# Patient Record
Sex: FEMALE | Race: WHITE | Employment: OTHER | URBAN - METROPOLITAN AREA
[De-identification: names, ages, dates, MRNs, and addresses within clinical notes are randomized per-mention and may not be internally consistent; named-entity substitution may affect disease eponyms.]

---

## 2017-12-11 ENCOUNTER — TRANSFERRED RECORDS (OUTPATIENT)
Dept: HEALTH INFORMATION MANAGEMENT | Facility: CLINIC | Age: 72
End: 2017-12-11

## 2018-01-03 ENCOUNTER — OFFICE VISIT (OUTPATIENT)
Dept: CARDIOLOGY | Facility: CLINIC | Age: 73
End: 2018-01-03
Payer: MEDICARE

## 2018-01-03 VITALS — SYSTOLIC BLOOD PRESSURE: 91 MMHG | HEART RATE: 62 BPM | DIASTOLIC BLOOD PRESSURE: 54 MMHG

## 2018-01-03 DIAGNOSIS — Z13.6 SCREENING FOR CARDIOVASCULAR CONDITION: ICD-10-CM

## 2018-01-03 DIAGNOSIS — Z13.6 SCREENING FOR CARDIOVASCULAR CONDITION: Primary | ICD-10-CM

## 2018-01-03 DIAGNOSIS — E11.00 TYPE 2 DIABETES MELLITUS WITH HYPEROSMOLARITY WITHOUT COMA, WITHOUT LONG-TERM CURRENT USE OF INSULIN (H): ICD-10-CM

## 2018-01-03 DIAGNOSIS — I89.0 LYMPHEDEMA OF BOTH LOWER EXTREMITIES: Primary | ICD-10-CM

## 2018-01-03 DIAGNOSIS — N18.30 CKD (CHRONIC KIDNEY DISEASE) STAGE 3, GFR 30-59 ML/MIN (H): ICD-10-CM

## 2018-01-03 DIAGNOSIS — C83.10 MANTLE CELL LYMPHOMA, UNSPECIFIED BODY REGION (H): ICD-10-CM

## 2018-01-03 DIAGNOSIS — R60.0 LOCALIZED EDEMA: ICD-10-CM

## 2018-01-03 DIAGNOSIS — I48.19 PERSISTENT ATRIAL FIBRILLATION (H): ICD-10-CM

## 2018-01-03 PROCEDURE — 99205 OFFICE O/P NEW HI 60 MIN: CPT | Performed by: INTERNAL MEDICINE

## 2018-01-03 PROCEDURE — 93005 ELECTROCARDIOGRAM TRACING: CPT | Performed by: INTERNAL MEDICINE

## 2018-01-03 RX ORDER — BISACODYL 5 MG/1
5 TABLET, DELAYED RELEASE ORAL DAILY
COMMUNITY

## 2018-01-03 RX ORDER — DIGOXIN 125 MCG
125 TABLET ORAL DAILY
COMMUNITY

## 2018-01-03 RX ORDER — MULTIVIT WITH MINERALS/LUTEIN
1000 TABLET ORAL DAILY
Status: ON HOLD | COMMUNITY
End: 2020-10-08

## 2018-01-03 RX ORDER — TIZANIDINE HYDROCHLORIDE 2 MG/1
2 CAPSULE, GELATIN COATED ORAL 4 TIMES DAILY PRN
COMMUNITY

## 2018-01-03 RX ORDER — FLUTICASONE PROPIONATE 220 UG/1
2 AEROSOL, METERED RESPIRATORY (INHALATION) 2 TIMES DAILY
COMMUNITY

## 2018-01-03 RX ORDER — METOPROLOL TARTRATE 50 MG
25 TABLET ORAL 2 TIMES DAILY
COMMUNITY

## 2018-01-03 RX ORDER — SIMVASTATIN 10 MG
10 TABLET ORAL AT BEDTIME
COMMUNITY

## 2018-01-03 RX ORDER — PIOGLITAZONEHYDROCHLORIDE 30 MG/1
30 TABLET ORAL DAILY
Status: ON HOLD | COMMUNITY
End: 2020-10-20

## 2018-01-03 RX ORDER — ALBUTEROL SULFATE 90 UG/1
2 AEROSOL, METERED RESPIRATORY (INHALATION) EVERY 4 HOURS PRN
COMMUNITY

## 2018-01-03 RX ORDER — FUROSEMIDE 40 MG
80 TABLET ORAL DAILY
Status: ON HOLD | COMMUNITY
End: 2020-10-20

## 2018-01-03 RX ORDER — POTASSIUM CHLORIDE 1.5 G/1.58G
20 POWDER, FOR SOLUTION ORAL 2 TIMES DAILY
Status: ON HOLD | COMMUNITY
End: 2020-10-20

## 2018-01-03 RX ORDER — LISINOPRIL 10 MG/1
10 TABLET ORAL DAILY
Status: ON HOLD | COMMUNITY
End: 2020-10-20

## 2018-01-03 RX ORDER — OSELTAMIVIR PHOSPHATE 75 MG/1
75 CAPSULE ORAL DAILY
Status: ON HOLD | COMMUNITY
End: 2020-10-08

## 2018-01-03 RX ORDER — OXYBUTYNIN CHLORIDE 5 MG/1
5 TABLET ORAL 2 TIMES DAILY
COMMUNITY

## 2018-01-03 RX ORDER — CEPHALEXIN 500 MG/1
500 CAPSULE ORAL 3 TIMES DAILY
Status: ON HOLD | COMMUNITY
End: 2020-10-08

## 2018-01-03 NOTE — MR AVS SNAPSHOT
"              After Visit Summary   1/3/2018    Waleska Marie    MRN: [id number]           Patient Information     Date Of Birth          1945        Visit Information        Provider Department      1/3/2018 10:00 AM Chicho Chacon MD [facility]        Today's Diagnoses     Localized edema    -  1    Screening for cardiovascular condition        Lymphedema of both lower extremities           Follow-ups after your visit        Additional Services     LYMPHEDEMA THERAPY REFERRAL       This therapy referral will be filtered to a centralized scheduling office at [facility] and the patient will receive a call to schedule an appointment at a [facility] location most convenient for them. *   If you have not heard from the scheduling office within 2 business days, please call [phone] for all locations, with the exception of [place], please call [phone].     Treatment: PT or OT Evaluation & Treatment  PT/OT Treatment Diagnosis:    Please be aware that coverage of these services is subject to the terms and limitations of your health insurance plan.  Call member services at your health plan with any benefit or coverage questions.    Note to Provider:  If you are referring outside of [facility] for the therapy appointment, please list the name of the location in the \"special instructions\" above, print the referral and give to the patient to schedule the appointment.            Follow-Up with Cardiologist                 Your next 10 appointments already scheduled     Jan 12, 2018 10:00 AM CST   US LOWER EXTREMITY VENOUS COMPETENCY BILATERAL with [facility] ([facility])    [id number] [address] [address] [phone]           Please bring a list of your medicines (including vitamins, minerals and over-the-counter drugs). Also, tell your doctor about any allergies " "you may have. Wear comfortable clothes and leave your valuables at home.  You do not need to do anything special to prepare for your exam.  Please call the Imaging Department at your exam site with any questions.            Jan 12, 2018  2:00 PM CST   Lymphedema Evaluation with Norberto Parsons   [facility] ([facility])    [address]  [facility] [address]   [phone]              Future tests that were ordered for you today     Open Future Orders        Priority Expected Expires Ordered    Follow-Up with Cardiologist Routine 2/9/2018 1/3/2019 1/3/2018     Venous Competency Bilateral Routine 2/2/2018 1/3/2019 1/3/2018    LYMPHEDEMA THERAPY REFERRAL Routine 1/3/2018 1/3/2020 1/3/2018            Who to contact     If you have questions or need follow up information about today's clinic visit or your schedule please contact [facility] directly at [phone].  Normal or non-critical lab and imaging results will be communicated to you by [employer], letter or phone within 4 business days after the clinic has received the results. If you do not hear from us within 7 days, please contact the clinic through [employer] or phone. If you have a critical or abnormal lab result, we will notify you by phone as soon as possible.  Submit refill requests through [employer] or call your pharmacy and they will forward the refill request to us. Please allow 3 business days for your refill to be completed.          Additional Information About Your Visit        [employer] Information     [employer] lets you send messages to your doctor, view your test results, renew your prescriptions, schedule appointments and more. To sign up, go to [email or web address] . Click on \"Log in\" on the left side of the screen, which will take you to the Welcome page. Then click on \"Sign up Now\" on the right side of the page.     You will be asked to enter the access code " listed below, as well as some personal information. Please follow the directions to create your username and password.     Your access code is: [email or web address]  Expires: 4/3/2018 11:09 AM     Your access code will  in 90 days. If you need help or a new code, please call your [facility] clinic or [phone].        Care EveryWhere ID     This is your Care EveryWhere ID. This could be used by other organizations to access your [facility] medical records  [id number]        Your Vitals Were     Pulse                   62            Blood Pressure from Last 3 Encounters:   18 91/54    Weight from Last 3 Encounters:   No data found for Wt              We Performed the Following     EKG 12-lead complete w/read - Clinics (future- to be scheduled)        Primary Care Provider Office Phone # Fax #    Nahun Espinoza [phone]       [place] [address]        Equal Access to Services     BRAIN ANTHONY : Hadii aad ku hadasho Soomaali, waaxda luqadaha, qaybta kaalmada adeegyada, waxay idiin hayaan kalie diaz . So [facility] [phone].    ATENCIÓN: Si habla español, tiene a thomson disposición servicios gratuitos de asistencia lingüística. Hans al [phone].    We comply with applicable federal civil rights laws and [place] laws. We do not discriminate on the basis of race, color, national origin, age, disability, sex, sexual orientation, or gender identity.            Thank you!     Thank you for choosing [facility]  for your care. Our goal is always to provide you with excellent care. Hearing back from our patients is one way we can continue to improve our services. Please take a few minutes to complete the written survey that you may receive in the mail after your visit with us. Thank you!             Your Updated Medication List - Protect others around you: Learn how to safely use, store and throw away your medicines  at www.disposemymeds.org.          This list is accurate as of: 1/3/18 11:11 AM.  Always use your most recent med list.                   Brand Name Dispense Instructions for use Diagnosis    ACTOS 30 MG tablet   Generic drug:  pioglitazone      Take 30 mg by mouth daily        albuterol 108 (90 BASE) MCG/ACT Inhaler    PROAIR HFA/PROVENTIL HFA/VENTOLIN HFA     Inhale 2 puffs into the lungs every 4 hours as needed for shortness of breath / dyspnea or wheezing        aspirin 81 MG EC tablet      Take 81 mg by mouth daily        digoxin 125 MCG tablet    LANOXIN     Take 125 mcg by mouth daily        DULCOLAX 5 MG EC tablet   Generic drug:  bisacodyl      Take 10 mg by mouth daily        ELIQUIS 5 MG tablet   Generic drug:  apixaban ANTICOAGULANT      Take 5 mg by mouth 2 times daily        FLOVENT  MCG/ACT Inhaler   Generic drug:  fluticasone      Inhale 1 puff into the lungs 2 times daily        furosemide 40 MG tablet    LASIX     Take 80 mg by mouth 2 times daily        GLUCOSAMINE-CHONDROITIN PO      Take 2 capsules by mouth 2 times daily        IMBRUVICA 140 MG capsule   Generic drug:  ibrutinib      Take 420 mg by mouth daily        KEFLEX 500 MG capsule   Generic drug:  cephALEXin      Take 500 mg by mouth 3 times daily        LANTUS SOLOSTAR 100 UNIT/ML injection   Generic drug:  insulin glargine      Inject Subcutaneous At Bedtime        lisinopril 10 MG tablet    PRINIVIL/ZESTRIL     Take 10 mg by mouth daily        metFORMIN 500 MG tablet    GLUCOPHAGE     Take 500 mg by mouth 2 times daily (with meals)        METHYL SALICYLATE EX      Externally apply topically as needed        metoprolol 50 MG tablet    LOPRESSOR     Take 25 mg by mouth 2 times daily        MULTIVITAMIN ADULT PO      Take by mouth daily        oxybutynin 5 MG tablet    DITROPAN     Take 5 mg by mouth 2 times daily        potassium chloride 20 MEQ Packet    KLOR-CON     Take 20 mEq by mouth 2 times daily        simvastatin 10 MG  tablet    ZOCOR     Take 10 mg by mouth At Bedtime        SOY ISOFLAVONE PO      Take 198 mg by mouth daily        TAMIFLU 75 MG capsule   Generic drug:  oseltamivir      Take 75 mg by mouth daily        vitamin E 1000 UNIT capsule    TOCOPHEROL     Take 1,000 Units by mouth daily        ZANAFLEX 2 MG Caps   Generic drug:  TiZANidine HCl      Take by mouth as needed        ZANTAC 150 MG tablet   Generic drug:  ranitidine      Take 150 mg by mouth 2 times daily        ZYRTEC ALLERGY 10 MG Caps   Generic drug:  cetirizine HCl      Take by mouth daily

## 2018-01-03 NOTE — PROGRESS NOTES
HPI and Plan:   See dictation    Orders Placed This Encounter   Procedures     US Venous Competency Bilateral     LYMPHEDEMA THERAPY REFERRAL     Follow-Up with Cardiologist       Orders Placed This Encounter   Medications     albuterol (PROAIR HFA/PROVENTIL HFA/VENTOLIN HFA) 108 (90 BASE) MCG/ACT Inhaler     Sig: Inhale 2 puffs into the lungs every 4 hours as needed for shortness of breath / dyspnea or wheezing     apixaban ANTICOAGULANT (ELIQUIS) 5 MG tablet     Sig: Take 5 mg by mouth 2 times daily     aspirin 81 MG EC tablet     Sig: Take 81 mg by mouth daily     bisacodyl (DULCOLAX) 5 MG EC tablet     Sig: Take 10 mg by mouth daily     cephALEXin (KEFLEX) 500 MG capsule     Sig: Take 500 mg by mouth 3 times daily     cetirizine HCl (ZYRTEC ALLERGY) 10 MG CAPS     Sig: Take by mouth daily     digoxin (LANOXIN) 125 MCG tablet     Sig: Take 125 mcg by mouth daily     fluticasone (FLOVENT HFA) 220 MCG/ACT Inhaler     Sig: Inhale 1 puff into the lungs 2 times daily     furosemide (LASIX) 40 MG tablet     Sig: Take 80 mg by mouth 2 times daily     GLUCOSAMINE-CHONDROITIN PO     Sig: Take 2 capsules by mouth 2 times daily     ibrutinib (IMBRUVICA) 140 MG capsule     Sig: Take 420 mg by mouth daily     insulin glargine (LANTUS SOLOSTAR) 100 UNIT/ML injection     Sig: Inject Subcutaneous At Bedtime     lisinopril (PRINIVIL/ZESTRIL) 10 MG tablet     Sig: Take 10 mg by mouth daily     metFORMIN (GLUCOPHAGE) 500 MG tablet     Sig: Take 500 mg by mouth 2 times daily (with meals)     METHYL SALICYLATE EX     Sig: Externally apply topically as needed     metoprolol (LOPRESSOR) 50 MG tablet     Sig: Take 25 mg by mouth 2 times daily     Multiple Vitamins-Minerals (MULTIVITAMIN ADULT PO)     Sig: Take by mouth daily     oseltamivir (TAMIFLU) 75 MG capsule     Sig: Take 75 mg by mouth daily     oxybutynin (DITROPAN) 5 MG tablet     Sig: Take 5 mg by mouth 2 times daily     pioglitazone (ACTOS) 30 MG tablet     Sig: Take 30 mg by  mouth daily     potassium chloride (KLOR-CON) 20 MEQ Packet     Sig: Take 20 mEq by mouth 2 times daily     ranitidine (ZANTAC) 150 MG tablet     Sig: Take 150 mg by mouth 2 times daily     simvastatin (ZOCOR) 10 MG tablet     Sig: Take 10 mg by mouth At Bedtime     SOY ISOFLAVONE PO     Sig: Take 198 mg by mouth daily     TiZANidine HCl (ZANAFLEX) 2 MG CAPS     Sig: Take by mouth as needed     vitamin E (TOCOPHEROL) 1000 UNIT capsule     Sig: Take 1,000 Units by mouth daily       There are no discontinued medications.      Encounter Diagnoses   Name Primary?     Screening for cardiovascular condition      Lymphedema of both lower extremities Yes     Localized edema      Mantle cell lymphoma, unspecified body region (H)      Persistent atrial fibrillation (H)      CKD (chronic kidney disease) stage 3, GFR 30-59 ml/min      Type 2 diabetes mellitus with hyperosmolarity without coma, without long-term current use of insulin (H)        CURRENT MEDICATIONS:  Current Outpatient Prescriptions   Medication Sig Dispense Refill     albuterol (PROAIR HFA/PROVENTIL HFA/VENTOLIN HFA) 108 (90 BASE) MCG/ACT Inhaler Inhale 2 puffs into the lungs every 4 hours as needed for shortness of breath / dyspnea or wheezing       apixaban ANTICOAGULANT (ELIQUIS) 5 MG tablet Take 5 mg by mouth 2 times daily       aspirin 81 MG EC tablet Take 81 mg by mouth daily       bisacodyl (DULCOLAX) 5 MG EC tablet Take 10 mg by mouth daily       cephALEXin (KEFLEX) 500 MG capsule Take 500 mg by mouth 3 times daily       cetirizine HCl (ZYRTEC ALLERGY) 10 MG CAPS Take by mouth daily       digoxin (LANOXIN) 125 MCG tablet Take 125 mcg by mouth daily       fluticasone (FLOVENT HFA) 220 MCG/ACT Inhaler Inhale 1 puff into the lungs 2 times daily       furosemide (LASIX) 40 MG tablet Take 80 mg by mouth 2 times daily       GLUCOSAMINE-CHONDROITIN PO Take 2 capsules by mouth 2 times daily       ibrutinib (IMBRUVICA) 140 MG capsule Take 420 mg by mouth daily        insulin glargine (LANTUS SOLOSTAR) 100 UNIT/ML injection Inject Subcutaneous At Bedtime       lisinopril (PRINIVIL/ZESTRIL) 10 MG tablet Take 10 mg by mouth daily       metFORMIN (GLUCOPHAGE) 500 MG tablet Take 500 mg by mouth 2 times daily (with meals)       METHYL SALICYLATE EX Externally apply topically as needed       metoprolol (LOPRESSOR) 50 MG tablet Take 25 mg by mouth 2 times daily       Multiple Vitamins-Minerals (MULTIVITAMIN ADULT PO) Take by mouth daily       oseltamivir (TAMIFLU) 75 MG capsule Take 75 mg by mouth daily       oxybutynin (DITROPAN) 5 MG tablet Take 5 mg by mouth 2 times daily       pioglitazone (ACTOS) 30 MG tablet Take 30 mg by mouth daily       potassium chloride (KLOR-CON) 20 MEQ Packet Take 20 mEq by mouth 2 times daily       ranitidine (ZANTAC) 150 MG tablet Take 150 mg by mouth 2 times daily       simvastatin (ZOCOR) 10 MG tablet Take 10 mg by mouth At Bedtime       SOY ISOFLAVONE PO Take 198 mg by mouth daily       TiZANidine HCl (ZANAFLEX) 2 MG CAPS Take by mouth as needed       vitamin E (TOCOPHEROL) 1000 UNIT capsule Take 1,000 Units by mouth daily         ALLERGIES     Allergies   Allergen Reactions     Azithromycin Hives     Ciprofloxacin      wheezing     Morphine Hives     Nitrofurantoin      Constipation, wheezing       PAST MEDICAL HISTORY:  No past medical history on file.    PAST SURGICAL HISTORY:  No past surgical history on file.    FAMILY HISTORY:  No family history on file.    SOCIAL HISTORY:  Social History     Social History     Marital status:      Spouse name: N/A     Number of children: N/A     Years of education: N/A     Social History Main Topics     Smoking status: Not on file     Smokeless tobacco: Not on file     Alcohol use Not on file     Drug use: Not on file     Sexual activity: Not on file     Other Topics Concern     Not on file     Social History Narrative       Review of Systems:  Skin:  Negative       Eyes:  Positive for glasses    ENT:   Negative      Respiratory:  Positive for shortness of breath asthma   Cardiovascular:    Positive for;edema;fatigue    Gastroenterology: Negative      Genitourinary:  Negative      Musculoskeletal:  Positive for arthritis    Neurologic:  Positive for headaches;numbness or tingling of hands    Psychiatric:  Positive for sleep disturbances    Heme/Lymph/Imm:  Negative      Endocrine:  Positive for diabetes      Physical Exam:  Vitals: BP 91/54 (BP Location: Other (Comment), Patient Position: Chair, Cuff Size: Adult Regular)  Pulse 62    Constitutional:    obese      Skin:  warm and dry to the touch venous stasis changes        Head:  normocephalic        Eyes:  conjunctivae and lids unremarkable        Lymph:No Cervical lymphadenopathy present     ENT:           Neck:  carotid pulses are full and equal bilaterally;JVP normal        Respiratory:  clear to auscultation         Cardiac: no murmurs, gallops or rubs detected irregularly irregular rhythm                pulses below the femoral arteries are diminished                                      GI:  abdomen soft;non-tender obese      Extremities and Muscular Skeletal:    stasis pigmentation bilateral LE edema;3+          Neurological:  no gross motor deficits        Psych:  Alert and Oriented x 3        CC  [facility]  [address] [place] [address]

## 2018-01-03 NOTE — LETTER
January 3, 2018          Nahun Espinoza MD   [facility]    [address] [place] [id number]      RE: Waleska Marie    MRN: [id number]   : 1945       REASON FOR VISIT:  Lower extremity lymphedema.      REFERRING PHYSICIAN:  Nahun Espinoza MD       Dear Dr. Espinoza:        I had the pleasure of seeing your patient Waleska Marie at the [facility] in [place] this morning.  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  She is referred for further evaluation regarding her lymphedema.     Ms. Marie tells me that she was doing fine until approximately about a year ago when she noticed  lower extremity edema.  The edema has progressed over the last 6 months.  Both legs have extensive lymphedema; however, the left leg is much larger.  She subsequently developed cellulitis as well as some skin breakdown, although she currently does not have any ulcerations.  She describes significant heaviness in both legs as well as achy symptoms.  She is unable to walk because of the lymphedema.  She does have some lymphedema in her left upper extremity as well.      Unfortunately, she has not had any compression therapy as far as I can tell.  She has been on a diuretic program, which has not helped.  She had an echocardiogram approximately 4 months ago in the [facility] system.  This was read as showing normal LV size and function and no wall motion abnormality.  Her RV size and function were normal.  Her PA pressures were mildly elevated, consistent with mild pulmonary hypertension.      As far as I can tell, she has never been evaluated for venous disease, including thrombosis or venous reflux.   However, she did have a CT of the abdomen and pelvis last year, which was read as showing patent inferior vena cava as well as iliac veins bilaterally.  That CT also showed no evidence of enlarged lymph nodes     She denies heart failure symptoms, although she is not very active.  Specifically, she denies significant shortness of breath.  She also denies orthopnea or PND.  No chest pain or palpitations at this point.  She is on a rate-control strategy with regard to her persistent atrial fibrillation and is currently on digoxin as well as metoprolol.  She is also on Eliquis for anticoagulation to prevent stroke from her atrial fibrillation.      IMPRESSION AND PLAN:   1.  Severe bilateral lower extremity lymphedema, left greater than right.   2.  Mild upper extremity lymphedema, left greater than right.   3.  History of mantle cell lymphoma  4.  Morbid obesity.   5.  Persistent atrial fibrillation, on anticoagulation and rate-control strategy.   6.  History of acute on chronic congestive heart failure, now compensated.   7.  Type 2 diabetes.   8.  Hypertension.   9.  Chronic kidney disease.      Unfortunately, she has developed progressive predominantly lower extremity lymphedema, left greater than right, over the last year. Her lymphedema is likely related to her prior Lymphoma. As such she will need a CT of the abdomen and pelvis to further investigate possible recurrence.     We will obtain a venous ultrasound to rule out reflux, although my suspicion for secondary lymphedema related to chronic venous insufficiency is low.  She might benefit from a venous ablation if we find significant venous reflux.      Finally, we will refer her to our Lymphedema Clinic here for further evaluation.  I will also have her start a compression therapy as soon as possible.  Finally, if the compression therapy does not improve her symptoms, then I recommended that she be evaluated at the [facility] to see if she would be a  candidate for any advanced therapies.       She will continue rest of her medical program including Elequis for stroke prevention.      I appreciate the opportunity to participate in this patient's care.       Outpatient Encounter Prescriptions as of 1/3/2018   Medication Sig Dispense Refill     albuterol (PROAIR HFA/PROVENTIL HFA/VENTOLIN HFA) 108 (90 BASE) MCG/ACT Inhaler Inhale 2 puffs into the lungs every 4 hours as needed for shortness of breath / dyspnea or wheezing       apixaban ANTICOAGULANT (ELIQUIS) 5 MG tablet Take 5 mg by mouth 2 times daily       aspirin 81 MG EC tablet Take 81 mg by mouth daily       bisacodyl (DULCOLAX) 5 MG EC tablet Take 10 mg by mouth daily       cephALEXin (KEFLEX) 500 MG capsule Take 500 mg by mouth 3 times daily       cetirizine HCl (ZYRTEC ALLERGY) 10 MG CAPS Take by mouth daily       digoxin (LANOXIN) 125 MCG tablet Take 125 mcg by mouth daily       fluticasone (FLOVENT HFA) 220 MCG/ACT Inhaler Inhale 1 puff into the lungs 2 times daily       furosemide (LASIX) 40 MG tablet Take 80 mg by mouth 2 times daily       GLUCOSAMINE-CHONDROITIN PO Take 2 capsules by mouth 2 times daily       ibrutinib (IMBRUVICA) 140 MG capsule Take 420 mg by mouth daily       insulin glargine (LANTUS SOLOSTAR) 100 UNIT/ML injection Inject Subcutaneous At Bedtime       lisinopril (PRINIVIL/ZESTRIL) 10 MG tablet Take 10 mg by mouth daily       metFORMIN (GLUCOPHAGE) 500 MG tablet Take 500 mg by mouth 2 times daily (with meals)       METHYL SALICYLATE EX Externally apply topically as needed       metoprolol (LOPRESSOR) 50 MG tablet Take 25 mg by mouth 2 times daily       Multiple Vitamins-Minerals (MULTIVITAMIN ADULT PO) Take by mouth daily       oseltamivir (TAMIFLU) 75 MG capsule Take 75 mg by mouth daily       oxybutynin (DITROPAN) 5 MG tablet Take 5 mg by mouth 2 times daily       pioglitazone (ACTOS) 30 MG tablet Take 30 mg by mouth daily       potassium chloride (KLOR-CON) 20 MEQ Packet Take 20  mEq by mouth 2 times daily       ranitidine (ZANTAC) 150 MG tablet Take 150 mg by mouth 2 times daily       simvastatin (ZOCOR) 10 MG tablet Take 10 mg by mouth At Bedtime       SOY ISOFLAVONE PO Take 198 mg by mouth daily       TiZANidine HCl (ZANAFLEX) 2 MG CAPS Take by mouth as needed       vitamin E (TOCOPHEROL) 1000 UNIT capsule Take 1,000 Units by mouth daily       No facility-administered encounter medications on file as of 1/3/2018.        Again, thank you for allowing me to participate in the care of your patient.      Sincerely,    Chicho Chacon MD, MD     [facility]

## 2018-01-03 NOTE — PROGRESS NOTES
January 3, 2018          Nahun Espinoza MD   [facility]    [address] [place] [id number]      RE: Waleska Marie    MRN: [id number]   : 1945       REASON FOR VISIT:  Lower extremity lymphedema.      REFERRING PHYSICIAN:  Nahun Espinoza MD       Dear Dr. Espinoza:        I had the pleasure of seeing your patient Waleska Marie at the [facility] in [place] this morning.  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  She is referred for further evaluation regarding her lymphedema.     Ms. Marie tells me that she was doing fine until approximately about a year ago when she noticed  lower extremity edema.  The edema has progressed over the last 6 months.  Both legs have extensive lymphedema; however, the left leg is much larger.  She subsequently developed cellulitis as well as some skin breakdown, although she currently does not have any ulcerations.  She describes significant heaviness in both legs as well as achy symptoms.  She is unable to walk because of the lymphedema.  She does have some lymphedema in her left upper extremity as well.      Unfortunately, she has not had any compression therapy as far as I can tell.  She has been on a diuretic program, which has not helped.  She had an echocardiogram approximately 4 months ago in the [facility] system.  This was read as showing normal LV size and function and no wall motion abnormality.  Her RV size and function were normal.  Her PA pressures were mildly elevated, consistent with mild pulmonary hypertension.      As far as I can tell, she has never been evaluated for venous disease, including thrombosis or venous reflux.   However, she did have a CT of the abdomen and pelvis last year, which was read as showing patent inferior vena cava as well as iliac veins bilaterally.  That CT also showed no evidence of enlarged lymph nodes     She denies heart failure symptoms, although she is not very active.  Specifically, she denies significant shortness of breath.  She also denies orthopnea or PND.  No chest pain or palpitations at this point.  She is on a rate-control strategy with regard to her persistent atrial fibrillation and is currently on digoxin as well as metoprolol.  She is also on Eliquis for anticoagulation to prevent stroke from her atrial fibrillation.      IMPRESSION AND PLAN:   1.  Severe bilateral lower extremity lymphedema, left greater than right.   2.  Mild upper extremity lymphedema, left greater than right.   3.  History of mantle cell lymphoma  4.  Morbid obesity.   5.  Persistent atrial fibrillation, on anticoagulation and rate-control strategy.   6.  History of acute on chronic congestive heart failure, now compensated.   7.  Type 2 diabetes.   8.  Hypertension.   9.  Chronic kidney disease.      Unfortunately, she has developed progressive predominantly lower extremity lymphedema, left greater than right, over the last year. Her lymphedema is likely related to her prior Lymphoma. As such she will need a CT of the abdomen and pelvis to further investigate possible recurrence.     We will obtain a venous ultrasound to rule out reflux, although my suspicion for secondary lymphedema related to chronic venous insufficiency is low.  She might benefit from a venous ablation if we find significant venous reflux.      Finally, we will refer her to our Lymphedema Clinic here for further evaluation.  I will also have her start a compression therapy as soon as possible.  Finally, if the compression therapy does not improve her symptoms, then I recommended that she be evaluated at the [facility] to see if she would be a  candidate for any advanced therapies.       She will continue rest of her medical program including Elequis for stroke prevention.      I appreciate the opportunity to participate in this patient's care.      Sincerely,        Chicho Chacon MD            D: 2018 11:15   T: 2018 12:16   MT: tyler      Name:     ADRIENNE FERRELL   MRN:      [id number]        Account:      [id number]   :      1945           Service Date: 2018      Document: [id number]

## 2018-01-09 ENCOUNTER — TELEPHONE (OUTPATIENT)
Dept: CARDIOLOGY | Facility: CLINIC | Age: 73
End: 2018-01-09

## 2018-01-09 NOTE — TELEPHONE ENCOUNTER
Received Staff Message from Dr. Chacon:   This patient needs lymphedema compression therapy. Can you fax her note to Sapna Ivy, rep from [facility] so they can arrange home compression therapy     Fax number: [phone]         OV note faxed to number above. Tried to call Sapna at [phone]. No answer. Left VM informing her that patients information has been faxed. Left team 4 direct number to call with any questions or needs.

## 2018-01-12 ENCOUNTER — HOSPITAL ENCOUNTER (OUTPATIENT)
Dept: CARDIOLOGY | Facility: CLINIC | Age: 73
Discharge: HOME | End: 2018-01-12
Attending: INTERNAL MEDICINE | Admitting: INTERNAL MEDICINE
Payer: MEDICARE

## 2018-01-12 DIAGNOSIS — R60.0 LOCALIZED EDEMA: ICD-10-CM

## 2018-01-12 PROCEDURE — 93970 EXTREMITY STUDY: CPT

## 2018-01-12 PROCEDURE — 93970 EXTREMITY STUDY: CPT | Mod: 26 | Performed by: INTERNAL MEDICINE

## 2018-01-12 NOTE — TELEPHONE ENCOUNTER
Patient's son reports she has not received the compression therapy or had at update.  Called Sapna back at [phone] and left a message to call team 4.     Called Bharat, patient's son, informed him I have a call out to Sapna and will call him back with an update when I hear from her.  He stated these two numbers are best to reach him at [phone] Or [phone].

## 2018-01-18 NOTE — TELEPHONE ENCOUNTER
Patient's son called asking for an updated. Informed him writer would reach out the the company and call him back.    Spoke to Sapna and the patient has to do 4 weeks of conservative therapy prior to receiving the compression therapy system per Medicare. Patient has almost reached the 4 week point. Sapna is going to call Bharat, patient's son, and give him an update.      Spoke to patient's son and gave him the updated. He is pleased to hear it should happen soon. No further questions.

## 2018-01-22 NOTE — TELEPHONE ENCOUNTER
Received call from patients son stating that they have not received a call from the edema therapy rep yet.     Tried to call Sapna with [employer] to see if she will contact the patients son to discuss the next steps. No answer. Left VM to call team 4 back with direct number.     Pts son updated. Writer informed williamsnets son that we will keep him updated if we speak to Sapna. Pts son thanked writer for the call.

## 2018-01-22 NOTE — TELEPHONE ENCOUNTER
Spoke to Sapna and she stated that she tried to call the patient last week but they must not have gotten her phone call. Sapna stated that she will try calling pts son again. Writer confirmed phone number.   Sapna stated that pt will have to do 4 consecutive weeks of therapy so she will have get that set up with the patients son. Spoke to patients son and gave him the update.

## 2018-01-29 ENCOUNTER — TELEPHONE (OUTPATIENT)
Dept: CARDIOLOGY | Facility: CLINIC | Age: 73
End: 2018-01-29

## 2018-01-29 NOTE — TELEPHONE ENCOUNTER
Received call from patients son stating that pt is getting discharged from the nursing home on Wednesday 1/31/18. Pts son mentioned that medicare needed something once she was discharged. Writer spoke to Sapna from [facility] [employer] and she stated that she had requested the records from the Nursing home and will be in touch with patients son to get him going on the compression therapy. Pts son updated.

## 2018-01-29 NOTE — TELEPHONE ENCOUNTER
Notes Recorded by Chicho Chacon MD on 1/29/2018 at 9:55 AM  Ultrasound reviewed. No DVT and no significant reflux in the greater saphenous veins      Patient update with a detailed voicemail. Team 4 number left

## 2020-10-08 ENCOUNTER — TRANSFERRED RECORDS (OUTPATIENT)
Dept: HEALTH INFORMATION MANAGEMENT | Facility: CLINIC | Age: 75
End: 2020-10-08

## 2020-10-08 ENCOUNTER — HOSPITAL ENCOUNTER (INPATIENT)
Facility: CLINIC | Age: 75
LOS: 13 days | Discharge: NURSING, REHAB OR LONG-TERM CARE | DRG: 682 | End: 2020-10-21
Attending: INTERNAL MEDICINE | Admitting: INTERNAL MEDICINE
Payer: MEDICARE

## 2020-10-08 ENCOUNTER — TELEPHONE (OUTPATIENT)
Dept: FAMILY MEDICINE | Facility: CLINIC | Age: 75
End: 2020-10-08

## 2020-10-08 DIAGNOSIS — D50.9 IRON DEFICIENCY ANEMIA, UNSPECIFIED IRON DEFICIENCY ANEMIA TYPE: ICD-10-CM

## 2020-10-08 DIAGNOSIS — K59.00 CONSTIPATION, UNSPECIFIED CONSTIPATION TYPE: ICD-10-CM

## 2020-10-08 DIAGNOSIS — Z79.4 TYPE 2 DIABETES MELLITUS WITH OTHER SPECIFIED COMPLICATION, WITH LONG-TERM CURRENT USE OF INSULIN (H): ICD-10-CM

## 2020-10-08 DIAGNOSIS — E11.69 TYPE 2 DIABETES MELLITUS WITH OTHER SPECIFIED COMPLICATION, WITH LONG-TERM CURRENT USE OF INSULIN (H): ICD-10-CM

## 2020-10-08 DIAGNOSIS — J44.9 CHRONIC OBSTRUCTIVE PULMONARY DISEASE, UNSPECIFIED COPD TYPE (H): ICD-10-CM

## 2020-10-08 DIAGNOSIS — K21.00 GASTROESOPHAGEAL REFLUX DISEASE WITH ESOPHAGITIS, UNSPECIFIED WHETHER HEMORRHAGE: ICD-10-CM

## 2020-10-08 DIAGNOSIS — I89.0 LYMPHEDEMA: Primary | ICD-10-CM

## 2020-10-08 PROBLEM — E87.5 HYPERKALEMIA: Status: ACTIVE | Noted: 2020-10-08

## 2020-10-08 LAB
ALBUMIN SERPL-MCNC: 2.5 G/DL (ref 3.4–5)
ALP SERPL-CCNC: 97 U/L (ref 40–150)
ALT SERPL W P-5'-P-CCNC: 13 U/L (ref 0–50)
ANION GAP SERPL CALCULATED.3IONS-SCNC: 8 MMOL/L (ref 3–14)
AST SERPL W P-5'-P-CCNC: 12 U/L (ref 0–45)
BILIRUB SERPL-MCNC: 0.3 MG/DL (ref 0.2–1.3)
BUN SERPL-MCNC: 80 MG/DL (ref 7–30)
CALCIUM SERPL-MCNC: 8.4 MG/DL (ref 8.5–10.1)
CHLORIDE SERPL-SCNC: 112 MMOL/L (ref 94–109)
CK SERPL-CCNC: 76 U/L (ref 30–225)
CO2 SERPL-SCNC: 13 MMOL/L (ref 20–32)
CREAT SERPL-MCNC: 2.75 MG/DL (ref 0.57–1.11)
CREAT SERPL-MCNC: 3.14 MG/DL (ref 0.52–1.04)
DIGOXIN SERPL-MCNC: 1.2 UG/L (ref 0.5–2)
ERYTHROCYTE [DISTWIDTH] IN BLOOD BY AUTOMATED COUNT: 15.8 % (ref 10–15)
GFR SERPL CREATININE-BSD FRML MDRD: 14 ML/MIN/{1.73_M2}
GFR SERPL CREATININE-BSD FRML MDRD: 17 ML/MIN/1.73M2
GLUCOSE BLDC GLUCOMTR-MCNC: 94 MG/DL (ref 70–99)
GLUCOSE SERPL-MCNC: 90 MG/DL (ref 70–99)
GLUCOSE SERPL-MCNC: 96 MG/DL (ref 65–100)
HCT VFR BLD AUTO: 25.9 % (ref 35–47)
HGB BLD-MCNC: 7.2 G/DL (ref 11.7–15.7)
INR PPP: 1.5
LACTATE BLD-SCNC: 0.9 MMOL/L (ref 0.7–2)
MAGNESIUM SERPL-MCNC: 2.2 MG/DL (ref 1.6–2.3)
MCH RBC QN AUTO: 27 PG (ref 26.5–33)
MCHC RBC AUTO-ENTMCNC: 27.8 G/DL (ref 31.5–36.5)
MCV RBC AUTO: 97 FL (ref 78–100)
PHOSPHATE SERPL-MCNC: 5.7 MG/DL (ref 2.5–4.5)
PLATELET # BLD AUTO: 431 10E9/L (ref 150–450)
POTASSIUM SERPL-SCNC: 7.5 MMOL/L (ref 3.4–5.3)
POTASSIUM SERPL-SCNC: 7.9 MMOL/L (ref 3.5–5)
PROT SERPL-MCNC: 6.5 G/DL (ref 6.8–8.8)
RBC # BLD AUTO: 2.67 10E12/L (ref 3.8–5.2)
SODIUM SERPL-SCNC: 133 MMOL/L (ref 133–144)
SODIUM UR-SCNC: 44 MMOL/L
TROPONIN I SERPL-MCNC: <0.015 UG/L (ref 0–0.04)
WBC # BLD AUTO: 11.3 10E9/L (ref 4–11)

## 2020-10-08 PROCEDURE — 200N000001 HC R&B ICU

## 2020-10-08 PROCEDURE — 36415 COLL VENOUS BLD VENIPUNCTURE: CPT | Performed by: INTERNAL MEDICINE

## 2020-10-08 PROCEDURE — 250N000013 HC RX MED GY IP 250 OP 250 PS 637: Performed by: INTERNAL MEDICINE

## 2020-10-08 PROCEDURE — 84300 ASSAY OF URINE SODIUM: CPT | Performed by: INTERNAL MEDICINE

## 2020-10-08 PROCEDURE — 999N001017 HC STATISTIC GLUCOSE BY METER IP

## 2020-10-08 PROCEDURE — P9047 ALBUMIN (HUMAN), 25%, 50ML: HCPCS | Performed by: INTERNAL MEDICINE

## 2020-10-08 PROCEDURE — 83735 ASSAY OF MAGNESIUM: CPT | Performed by: INTERNAL MEDICINE

## 2020-10-08 PROCEDURE — 258N000003 HC RX IP 258 OP 636: Performed by: INTERNAL MEDICINE

## 2020-10-08 PROCEDURE — 84484 ASSAY OF TROPONIN QUANT: CPT | Performed by: INTERNAL MEDICINE

## 2020-10-08 PROCEDURE — 85027 COMPLETE CBC AUTOMATED: CPT | Performed by: INTERNAL MEDICINE

## 2020-10-08 PROCEDURE — 250N000009 HC RX 250: Performed by: INTERNAL MEDICINE

## 2020-10-08 PROCEDURE — 80053 COMPREHEN METABOLIC PANEL: CPT | Performed by: INTERNAL MEDICINE

## 2020-10-08 PROCEDURE — 250N000011 HC RX IP 250 OP 636: Performed by: INTERNAL MEDICINE

## 2020-10-08 PROCEDURE — 84100 ASSAY OF PHOSPHORUS: CPT | Performed by: INTERNAL MEDICINE

## 2020-10-08 PROCEDURE — 99223 1ST HOSP IP/OBS HIGH 75: CPT | Mod: AI | Performed by: INTERNAL MEDICINE

## 2020-10-08 PROCEDURE — 83605 ASSAY OF LACTIC ACID: CPT | Performed by: INTERNAL MEDICINE

## 2020-10-08 PROCEDURE — 82550 ASSAY OF CK (CPK): CPT | Performed by: INTERNAL MEDICINE

## 2020-10-08 PROCEDURE — 80162 ASSAY OF DIGOXIN TOTAL: CPT | Performed by: INTERNAL MEDICINE

## 2020-10-08 RX ORDER — ONDANSETRON 4 MG/1
4 TABLET, ORALLY DISINTEGRATING ORAL EVERY 6 HOURS PRN
Status: DISCONTINUED | OUTPATIENT
Start: 2020-10-08 | End: 2020-10-21 | Stop reason: HOSPADM

## 2020-10-08 RX ORDER — AMOXICILLIN 250 MG
1 CAPSULE ORAL 2 TIMES DAILY PRN
Status: DISCONTINUED | OUTPATIENT
Start: 2020-10-08 | End: 2020-10-21 | Stop reason: HOSPADM

## 2020-10-08 RX ORDER — HYDROMORPHONE HYDROCHLORIDE 2 MG/1
2-4 TABLET ORAL EVERY 4 HOURS PRN
Status: DISCONTINUED | OUTPATIENT
Start: 2020-10-08 | End: 2020-10-21 | Stop reason: HOSPADM

## 2020-10-08 RX ORDER — IPRATROPIUM BROMIDE AND ALBUTEROL SULFATE 2.5; .5 MG/3ML; MG/3ML
3 SOLUTION RESPIRATORY (INHALATION) EVERY 4 HOURS PRN
Status: DISCONTINUED | OUTPATIENT
Start: 2020-10-08 | End: 2020-10-21 | Stop reason: HOSPADM

## 2020-10-08 RX ORDER — SIMVASTATIN 10 MG
10 TABLET ORAL AT BEDTIME
Status: DISCONTINUED | OUTPATIENT
Start: 2020-10-08 | End: 2020-10-21 | Stop reason: HOSPADM

## 2020-10-08 RX ORDER — NALOXONE HYDROCHLORIDE 0.4 MG/ML
.1-.4 INJECTION, SOLUTION INTRAMUSCULAR; INTRAVENOUS; SUBCUTANEOUS
Status: DISCONTINUED | OUTPATIENT
Start: 2020-10-08 | End: 2020-10-08

## 2020-10-08 RX ORDER — AMOXICILLIN 250 MG
2 CAPSULE ORAL 2 TIMES DAILY PRN
Status: DISCONTINUED | OUTPATIENT
Start: 2020-10-08 | End: 2020-10-21 | Stop reason: HOSPADM

## 2020-10-08 RX ORDER — NALOXONE HYDROCHLORIDE 0.4 MG/ML
.1-.4 INJECTION, SOLUTION INTRAMUSCULAR; INTRAVENOUS; SUBCUTANEOUS
Status: DISCONTINUED | OUTPATIENT
Start: 2020-10-08 | End: 2020-10-21 | Stop reason: HOSPADM

## 2020-10-08 RX ORDER — ONDANSETRON 2 MG/ML
4 INJECTION INTRAMUSCULAR; INTRAVENOUS EVERY 6 HOURS PRN
Status: DISCONTINUED | OUTPATIENT
Start: 2020-10-08 | End: 2020-10-21 | Stop reason: HOSPADM

## 2020-10-08 RX ORDER — CETIRIZINE HYDROCHLORIDE 10 MG/1
10 TABLET ORAL DAILY
Status: DISCONTINUED | OUTPATIENT
Start: 2020-10-09 | End: 2020-10-21 | Stop reason: HOSPADM

## 2020-10-08 RX ORDER — FLUTICASONE PROPIONATE 220 UG/1
1 AEROSOL, METERED RESPIRATORY (INHALATION) 2 TIMES DAILY
Status: DISCONTINUED | OUTPATIENT
Start: 2020-10-08 | End: 2020-10-09 | Stop reason: CLARIF

## 2020-10-08 RX ORDER — NICOTINE POLACRILEX 4 MG
15-30 LOZENGE BUCCAL
Status: DISCONTINUED | OUTPATIENT
Start: 2020-10-08 | End: 2020-10-09

## 2020-10-08 RX ORDER — DEXTROSE MONOHYDRATE 25 G/50ML
25-50 INJECTION, SOLUTION INTRAVENOUS
Status: DISCONTINUED | OUTPATIENT
Start: 2020-10-08 | End: 2020-10-09

## 2020-10-08 RX ORDER — ALBUMIN (HUMAN) 12.5 G/50ML
25 SOLUTION INTRAVENOUS ONCE
Status: COMPLETED | OUTPATIENT
Start: 2020-10-08 | End: 2020-10-09

## 2020-10-08 RX ORDER — OXYBUTYNIN CHLORIDE 5 MG/1
5 TABLET ORAL 2 TIMES DAILY
Status: DISCONTINUED | OUTPATIENT
Start: 2020-10-08 | End: 2020-10-21 | Stop reason: HOSPADM

## 2020-10-08 RX ORDER — LIDOCAINE 40 MG/G
CREAM TOPICAL
Status: DISCONTINUED | OUTPATIENT
Start: 2020-10-08 | End: 2020-10-10

## 2020-10-08 RX ORDER — SODIUM POLYSTYRENE SULFONATE 15 G/60ML
30 SUSPENSION ORAL; RECTAL ONCE
Status: COMPLETED | OUTPATIENT
Start: 2020-10-08 | End: 2020-10-08

## 2020-10-08 RX ORDER — POLYETHYLENE GLYCOL 3350 17 G/17G
17 POWDER, FOR SOLUTION ORAL DAILY PRN
Status: DISCONTINUED | OUTPATIENT
Start: 2020-10-08 | End: 2020-10-13

## 2020-10-08 RX ORDER — CEFTRIAXONE 1 G/1
1 INJECTION, POWDER, FOR SOLUTION INTRAMUSCULAR; INTRAVENOUS EVERY 24 HOURS
Status: DISCONTINUED | OUTPATIENT
Start: 2020-10-09 | End: 2020-10-14

## 2020-10-08 RX ORDER — ACETAMINOPHEN 325 MG/1
650 TABLET ORAL EVERY 4 HOURS PRN
Status: DISCONTINUED | OUTPATIENT
Start: 2020-10-08 | End: 2020-10-09

## 2020-10-08 RX ADMIN — ALBUMIN HUMAN 25 G: 0.25 SOLUTION INTRAVENOUS at 23:08

## 2020-10-08 RX ADMIN — Medication 5 MG: at 23:16

## 2020-10-08 RX ADMIN — SODIUM BICARBONATE: 84 INJECTION, SOLUTION INTRAVENOUS at 22:55

## 2020-10-08 RX ADMIN — SODIUM BICARBONATE 50 MEQ: 84 INJECTION, SOLUTION INTRAVENOUS at 22:08

## 2020-10-08 RX ADMIN — SIMVASTATIN 10 MG: 10 TABLET, FILM COATED ORAL at 23:57

## 2020-10-08 RX ADMIN — SODIUM POLYSTYRENE SULFONATE 30 G: 15 SUSPENSION ORAL; RECTAL at 22:08

## 2020-10-08 RX ADMIN — ACETAMINOPHEN 650 MG: 325 TABLET, FILM COATED ORAL at 23:56

## 2020-10-08 ASSESSMENT — MIFFLIN-ST. JEOR: SCORE: 1962.25

## 2020-10-08 NOTE — TELEPHONE ENCOUNTER
3-Hospitalist Huddle Documentation    Acuity/Preferred Bed Type: medical floor  Infection Concerns: none  Additional Specialist Needed Or Specialist Already Contacted:   Nephrology  Timely Treatments Needed: Lowering of Potassium  Important things to know/address during hospitalization: sitter not needed      Transferred from: [place] [address]  Provider: Dr. Mckay  Dx: Hyperkalemia and kidney failure  Patient requested our hospital    Bed bound patient presented with lower extremity lymphedema and weeping legs. Quite edematous. On lasix and potassium at home. Mainly complains of leg pain. Hx of COPD, lymphedema, A fib on coumadin, CKD (1.3 baseline).     Cr. 2.75, K 7.9, normal CK, normal WBC, Hgb 7.7, normal lactate.   UA shows 100 WBC, clumps, nitrites and leukocytes gave Ceftriaxone. Sent for culture  ECG: A fib with LBBB (heart rate 102), previously incomplete bundle branch block    Gave 1 litre of fluid, Lasix 40 IV, 3 albuterol nebs, insulin 10 units with glucose and 2 grams calcium gluconate. Put in davis (25 ml/hr).    Recheck potassium 7.4. Repeat ECG now shows incomplete bundle branch block.    COVID swab was done but will take a couple of days    I spoke with Dr. Buckley from Nephrology and he recommends that [place] [address] repeat Fluids, lasix, albuterol, insulin, calcium gluconate and give Kayexalate 30 grams. He is aware of patient.

## 2020-10-09 ENCOUNTER — APPOINTMENT (OUTPATIENT)
Dept: GENERAL RADIOLOGY | Facility: CLINIC | Age: 75
DRG: 682 | End: 2020-10-09
Attending: INTERNAL MEDICINE
Payer: MEDICARE

## 2020-10-09 LAB
ABO + RH BLD: NORMAL
ABO + RH BLD: NORMAL
ALBUMIN SERPL-MCNC: 2.1 G/DL (ref 3.4–5)
ALBUMIN UR-MCNC: 70 MG/DL
ANION GAP SERPL CALCULATED.3IONS-SCNC: 5 MMOL/L (ref 3–14)
ANION GAP SERPL CALCULATED.3IONS-SCNC: 7 MMOL/L (ref 3–14)
ANION GAP SERPL CALCULATED.3IONS-SCNC: 7 MMOL/L (ref 3–14)
ANION GAP SERPL CALCULATED.3IONS-SCNC: 8 MMOL/L (ref 3–14)
APPEARANCE UR: ABNORMAL
APTT PPP: 32 SEC (ref 22–37)
BASE DEFICIT BLDV-SCNC: 11.6 MMOL/L
BASE DEFICIT BLDV-SCNC: 9.6 MMOL/L
BASE EXCESS BLDV CALC-SCNC: 0.6 MMOL/L
BASOPHILS # BLD AUTO: 0 10E9/L (ref 0–0.2)
BASOPHILS NFR BLD AUTO: 0.5 %
BILIRUB UR QL STRIP: NEGATIVE
BLD GP AB SCN SERPL QL: NORMAL
BLD PROD TYP BPU: NORMAL
BLD UNIT ID BPU: 0
BLD UNIT ID BPU: 0
BLOOD BANK CMNT PATIENT-IMP: NORMAL
BLOOD PRODUCT CODE: NORMAL
BLOOD PRODUCT CODE: NORMAL
BPU ID: NORMAL
BPU ID: NORMAL
BUN SERPL-MCNC: 45 MG/DL (ref 7–30)
BUN SERPL-MCNC: 83 MG/DL (ref 7–30)
BUN SERPL-MCNC: 83 MG/DL (ref 7–30)
BUN SERPL-MCNC: 85 MG/DL (ref 7–30)
CALCIUM SERPL-MCNC: 7.3 MG/DL (ref 8.5–10.1)
CALCIUM SERPL-MCNC: 8.2 MG/DL (ref 8.5–10.1)
CALCIUM SERPL-MCNC: 8.2 MG/DL (ref 8.5–10.1)
CALCIUM SERPL-MCNC: 8.3 MG/DL (ref 8.5–10.1)
CHLORIDE SERPL-SCNC: 105 MMOL/L (ref 94–109)
CHLORIDE SERPL-SCNC: 113 MMOL/L (ref 94–109)
CHLORIDE SERPL-SCNC: 113 MMOL/L (ref 94–109)
CHLORIDE SERPL-SCNC: 114 MMOL/L (ref 94–109)
CK SERPL-CCNC: 219 U/L (ref 30–225)
CO2 SERPL-SCNC: 17 MMOL/L (ref 20–32)
CO2 SERPL-SCNC: 17 MMOL/L (ref 20–32)
CO2 SERPL-SCNC: 18 MMOL/L (ref 20–32)
CO2 SERPL-SCNC: 24 MMOL/L (ref 20–32)
COLOR UR AUTO: YELLOW
CREAT SERPL-MCNC: 2.13 MG/DL (ref 0.52–1.04)
CREAT SERPL-MCNC: 3.11 MG/DL (ref 0.52–1.04)
CREAT SERPL-MCNC: 3.25 MG/DL (ref 0.52–1.04)
CREAT SERPL-MCNC: 3.27 MG/DL (ref 0.52–1.04)
DIFFERENTIAL METHOD BLD: ABNORMAL
EOSINOPHIL # BLD AUTO: 0 10E9/L (ref 0–0.7)
EOSINOPHIL NFR BLD AUTO: 0.4 %
ERYTHROCYTE [DISTWIDTH] IN BLOOD BY AUTOMATED COUNT: 15.8 % (ref 10–15)
FERRITIN SERPL-MCNC: 14 NG/ML (ref 8–252)
FIBRINOGEN PPP-MCNC: 495 MG/DL (ref 200–420)
GFR SERPL CREATININE-BSD FRML MDRD: 13 ML/MIN/{1.73_M2}
GFR SERPL CREATININE-BSD FRML MDRD: 13 ML/MIN/{1.73_M2}
GFR SERPL CREATININE-BSD FRML MDRD: 14 ML/MIN/{1.73_M2}
GFR SERPL CREATININE-BSD FRML MDRD: 22 ML/MIN/{1.73_M2}
GLUCOSE BLDC GLUCOMTR-MCNC: 100 MG/DL (ref 70–99)
GLUCOSE BLDC GLUCOMTR-MCNC: 101 MG/DL (ref 70–99)
GLUCOSE BLDC GLUCOMTR-MCNC: 103 MG/DL (ref 70–99)
GLUCOSE BLDC GLUCOMTR-MCNC: 103 MG/DL (ref 70–99)
GLUCOSE BLDC GLUCOMTR-MCNC: 107 MG/DL (ref 70–99)
GLUCOSE BLDC GLUCOMTR-MCNC: 108 MG/DL (ref 70–99)
GLUCOSE BLDC GLUCOMTR-MCNC: 120 MG/DL (ref 70–99)
GLUCOSE BLDC GLUCOMTR-MCNC: 122 MG/DL (ref 70–99)
GLUCOSE BLDC GLUCOMTR-MCNC: 123 MG/DL (ref 70–99)
GLUCOSE BLDC GLUCOMTR-MCNC: 127 MG/DL (ref 70–99)
GLUCOSE BLDC GLUCOMTR-MCNC: 133 MG/DL (ref 70–99)
GLUCOSE BLDC GLUCOMTR-MCNC: 133 MG/DL (ref 70–99)
GLUCOSE BLDC GLUCOMTR-MCNC: 139 MG/DL (ref 70–99)
GLUCOSE BLDC GLUCOMTR-MCNC: 175 MG/DL (ref 70–99)
GLUCOSE BLDC GLUCOMTR-MCNC: 71 MG/DL (ref 70–99)
GLUCOSE BLDC GLUCOMTR-MCNC: 86 MG/DL (ref 70–99)
GLUCOSE BLDC GLUCOMTR-MCNC: 87 MG/DL (ref 70–99)
GLUCOSE BLDC GLUCOMTR-MCNC: 97 MG/DL (ref 70–99)
GLUCOSE SERPL-MCNC: 111 MG/DL (ref 70–99)
GLUCOSE SERPL-MCNC: 129 MG/DL (ref 70–99)
GLUCOSE SERPL-MCNC: 145 MG/DL (ref 70–99)
GLUCOSE SERPL-MCNC: 88 MG/DL (ref 70–99)
GLUCOSE UR STRIP-MCNC: NEGATIVE MG/DL
HBA1C MFR BLD: NORMAL % (ref 0–5.6)
HBV SURFACE AB SERPL IA-ACNC: 0 M[IU]/ML
HBV SURFACE AG SERPL QL IA: NONREACTIVE
HCO3 BLDV-SCNC: 16 MMOL/L (ref 21–28)
HCO3 BLDV-SCNC: 17 MMOL/L (ref 21–28)
HCO3 BLDV-SCNC: 25 MMOL/L (ref 21–28)
HCT VFR BLD AUTO: 21.3 % (ref 35–47)
HCT VFR BLD AUTO: 21.9 % (ref 35–47)
HGB BLD-MCNC: 5.8 G/DL (ref 11.7–15.7)
HGB BLD-MCNC: 6.1 G/DL (ref 11.7–15.7)
HGB BLD-MCNC: 6.5 G/DL (ref 11.7–15.7)
HGB UR QL STRIP: ABNORMAL
HYALINE CASTS #/AREA URNS LPF: 8 /LPF (ref 0–2)
IMM GRANULOCYTES # BLD: 0.1 10E9/L (ref 0–0.4)
IMM GRANULOCYTES NFR BLD: 0.6 %
INR PPP: 1.29 (ref 0.86–1.14)
INTERPRETATION ECG - MUSE: NORMAL
IRON SATN MFR SERPL: 7 % (ref 15–46)
IRON SERPL-MCNC: 17 UG/DL (ref 35–180)
KETONES UR STRIP-MCNC: NEGATIVE MG/DL
LABORATORY COMMENT REPORT: NORMAL
LACTATE BLD-SCNC: 0.5 MMOL/L (ref 0.7–2)
LEUKOCYTE ESTERASE UR QL STRIP: ABNORMAL
LYMPHOCYTES # BLD AUTO: 0.8 10E9/L (ref 0.8–5.3)
LYMPHOCYTES NFR BLD AUTO: 10.1 %
MAGNESIUM SERPL-MCNC: 2 MG/DL (ref 1.6–2.3)
MCH RBC QN AUTO: 27.5 PG (ref 26.5–33)
MCHC RBC AUTO-ENTMCNC: 28.6 G/DL (ref 31.5–36.5)
MCV RBC AUTO: 96 FL (ref 78–100)
MONOCYTES # BLD AUTO: 1.2 10E9/L (ref 0–1.3)
MONOCYTES NFR BLD AUTO: 14.2 %
MUCOUS THREADS #/AREA URNS LPF: PRESENT /LPF
NEUTROPHILS # BLD AUTO: 6 10E9/L (ref 1.6–8.3)
NEUTROPHILS NFR BLD AUTO: 74.2 %
NITRATE UR QL: NEGATIVE
NRBC # BLD AUTO: 0 10*3/UL
NRBC BLD AUTO-RTO: 0 /100
NUM BPU REQUESTED: 2
O2/TOTAL GAS SETTING VFR VENT: ABNORMAL %
OXYHGB MFR BLDV: 25 %
OXYHGB MFR BLDV: 32 %
OXYHGB MFR BLDV: 55 %
PCO2 BLDV: 39 MM HG (ref 40–50)
PCO2 BLDV: 40 MM HG (ref 40–50)
PCO2 BLDV: 41 MM HG (ref 40–50)
PH BLDV: 7.2 PH (ref 7.32–7.43)
PH BLDV: 7.23 PH (ref 7.32–7.43)
PH BLDV: 7.42 PH (ref 7.32–7.43)
PH UR STRIP: 5.5 PH (ref 5–7)
PHOSPHATE SERPL-MCNC: 3.3 MG/DL (ref 2.5–4.5)
PHOSPHATE SERPL-MCNC: 5.5 MG/DL (ref 2.5–4.5)
PLATELET # BLD AUTO: 316 10E9/L (ref 150–450)
PLATELET # BLD AUTO: 385 10E9/L (ref 150–450)
PO2 BLDV: 20 MM HG (ref 25–47)
PO2 BLDV: 23 MM HG (ref 25–47)
PO2 BLDV: 31 MM HG (ref 25–47)
POTASSIUM SERPL-SCNC: 4.5 MMOL/L (ref 3.4–5.3)
POTASSIUM SERPL-SCNC: 6.4 MMOL/L (ref 3.4–5.3)
POTASSIUM SERPL-SCNC: 6.4 MMOL/L (ref 3.4–5.3)
POTASSIUM SERPL-SCNC: 6.8 MMOL/L (ref 3.4–5.3)
POTASSIUM SERPL-SCNC: 7.1 MMOL/L (ref 3.4–5.3)
RBC # BLD AUTO: 2.22 10E12/L (ref 3.8–5.2)
RBC #/AREA URNS AUTO: 12 /HPF (ref 0–2)
SARS-COV-2 RNA SPEC QL NAA+PROBE: NEGATIVE
SARS-COV-2 RNA SPEC QL NAA+PROBE: NORMAL
SODIUM SERPL-SCNC: 136 MMOL/L (ref 133–144)
SODIUM SERPL-SCNC: 137 MMOL/L (ref 133–144)
SODIUM SERPL-SCNC: 137 MMOL/L (ref 133–144)
SODIUM SERPL-SCNC: 138 MMOL/L (ref 133–144)
SOURCE: ABNORMAL
SP GR UR STRIP: 1.02 (ref 1–1.03)
SPECIMEN EXP DATE BLD: NORMAL
SPECIMEN SOURCE: NORMAL
SPECIMEN SOURCE: NORMAL
SQUAMOUS #/AREA URNS AUTO: 1 /HPF (ref 0–1)
TIBC SERPL-MCNC: 259 UG/DL (ref 240–430)
TRANSFUSION STATUS PATIENT QL: NORMAL
UROBILINOGEN UR STRIP-MCNC: 2 MG/DL (ref 0–2)
WBC # BLD AUTO: 8.1 10E9/L (ref 4–11)
WBC #/AREA URNS AUTO: 52 /HPF (ref 0–5)

## 2020-10-09 PROCEDURE — 82784 ASSAY IGA/IGD/IGG/IGM EACH: CPT | Performed by: INTERNAL MEDICINE

## 2020-10-09 PROCEDURE — 99233 SBSQ HOSP IP/OBS HIGH 50: CPT | Performed by: INTERNAL MEDICINE

## 2020-10-09 PROCEDURE — 86334 IMMUNOFIX E-PHORESIS SERUM: CPT | Mod: TC | Performed by: INTERNAL MEDICINE

## 2020-10-09 PROCEDURE — 999N001017 HC STATISTIC GLUCOSE BY METER IP

## 2020-10-09 PROCEDURE — 999N000065 XR CHEST PORT 1 VW

## 2020-10-09 PROCEDURE — 83540 ASSAY OF IRON: CPT | Performed by: INTERNAL MEDICINE

## 2020-10-09 PROCEDURE — 250N000013 HC RX MED GY IP 250 OP 250 PS 637: Performed by: INTERNAL MEDICINE

## 2020-10-09 PROCEDURE — G0463 HOSPITAL OUTPT CLINIC VISIT: HCPCS

## 2020-10-09 PROCEDURE — 250N000009 HC RX 250: Performed by: INTERNAL MEDICINE

## 2020-10-09 PROCEDURE — 86901 BLOOD TYPING SEROLOGIC RH(D): CPT | Performed by: INTERNAL MEDICINE

## 2020-10-09 PROCEDURE — 85025 COMPLETE CBC W/AUTO DIFF WBC: CPT | Performed by: INTERNAL MEDICINE

## 2020-10-09 PROCEDURE — 93010 ELECTROCARDIOGRAM REPORT: CPT | Performed by: INTERNAL MEDICINE

## 2020-10-09 PROCEDURE — 5A1D70Z PERFORMANCE OF URINARY FILTRATION, INTERMITTENT, LESS THAN 6 HOURS PER DAY: ICD-10-PCS | Performed by: INTERNAL MEDICINE

## 2020-10-09 PROCEDURE — U0003 INFECTIOUS AGENT DETECTION BY NUCLEIC ACID (DNA OR RNA); SEVERE ACUTE RESPIRATORY SYNDROME CORONAVIRUS 2 (SARS-COV-2) (CORONAVIRUS DISEASE [COVID-19]), AMPLIFIED PROBE TECHNIQUE, MAKING USE OF HIGH THROUGHPUT TECHNOLOGIES AS DESCRIBED BY CMS-2020-01-R: HCPCS | Performed by: INTERNAL MEDICINE

## 2020-10-09 PROCEDURE — 83605 ASSAY OF LACTIC ACID: CPT | Performed by: ANESTHESIOLOGY

## 2020-10-09 PROCEDURE — 999N000157 HC STATISTIC RCP TIME EA 10 MIN

## 2020-10-09 PROCEDURE — 85049 AUTOMATED PLATELET COUNT: CPT | Performed by: ANESTHESIOLOGY

## 2020-10-09 PROCEDURE — 250N000011 HC RX IP 250 OP 636: Performed by: INTERNAL MEDICINE

## 2020-10-09 PROCEDURE — 86900 BLOOD TYPING SEROLOGIC ABO: CPT | Performed by: INTERNAL MEDICINE

## 2020-10-09 PROCEDURE — 258N000003 HC RX IP 258 OP 636: Performed by: INTERNAL MEDICINE

## 2020-10-09 PROCEDURE — 99356 PR PROLONGED SERV,INPATIENT,1ST HR: CPT | Performed by: NURSE PRACTITIONER

## 2020-10-09 PROCEDURE — 36415 COLL VENOUS BLD VENIPUNCTURE: CPT | Performed by: INTERNAL MEDICINE

## 2020-10-09 PROCEDURE — 85610 PROTHROMBIN TIME: CPT | Performed by: ANESTHESIOLOGY

## 2020-10-09 PROCEDURE — 82550 ASSAY OF CK (CPK): CPT | Performed by: INTERNAL MEDICINE

## 2020-10-09 PROCEDURE — 80069 RENAL FUNCTION PANEL: CPT | Performed by: INTERNAL MEDICINE

## 2020-10-09 PROCEDURE — 82805 BLOOD GASES W/O2 SATURATION: CPT | Performed by: INTERNAL MEDICINE

## 2020-10-09 PROCEDURE — 86923 COMPATIBILITY TEST ELECTRIC: CPT | Performed by: INTERNAL MEDICINE

## 2020-10-09 PROCEDURE — 80048 BASIC METABOLIC PNL TOTAL CA: CPT | Performed by: INTERNAL MEDICINE

## 2020-10-09 PROCEDURE — 83883 ASSAY NEPHELOMETRY NOT SPEC: CPT | Performed by: INTERNAL MEDICINE

## 2020-10-09 PROCEDURE — 99223 1ST HOSP IP/OBS HIGH 75: CPT | Performed by: NURSE PRACTITIONER

## 2020-10-09 PROCEDURE — 87340 HEPATITIS B SURFACE AG IA: CPT | Performed by: INTERNAL MEDICINE

## 2020-10-09 PROCEDURE — 90937 HEMODIALYSIS REPEATED EVAL: CPT

## 2020-10-09 PROCEDURE — 272N000026 HC KIT POWER PICC TRIPLE LUMEN

## 2020-10-09 PROCEDURE — 85018 HEMOGLOBIN: CPT | Performed by: ANESTHESIOLOGY

## 2020-10-09 PROCEDURE — 86850 RBC ANTIBODY SCREEN: CPT | Performed by: INTERNAL MEDICINE

## 2020-10-09 PROCEDURE — 81001 URINALYSIS AUTO W/SCOPE: CPT | Performed by: INTERNAL MEDICINE

## 2020-10-09 PROCEDURE — 82728 ASSAY OF FERRITIN: CPT | Performed by: INTERNAL MEDICINE

## 2020-10-09 PROCEDURE — 36569 INSJ PICC 5 YR+ W/O IMAGING: CPT

## 2020-10-09 PROCEDURE — 83735 ASSAY OF MAGNESIUM: CPT | Performed by: INTERNAL MEDICINE

## 2020-10-09 PROCEDURE — 85018 HEMOGLOBIN: CPT | Performed by: INTERNAL MEDICINE

## 2020-10-09 PROCEDURE — 258N000001 HC RX 258: Performed by: INTERNAL MEDICINE

## 2020-10-09 PROCEDURE — 93005 ELECTROCARDIOGRAM TRACING: CPT

## 2020-10-09 PROCEDURE — 83550 IRON BINDING TEST: CPT | Performed by: INTERNAL MEDICINE

## 2020-10-09 PROCEDURE — 85384 FIBRINOGEN ACTIVITY: CPT | Performed by: ANESTHESIOLOGY

## 2020-10-09 PROCEDURE — 84132 ASSAY OF SERUM POTASSIUM: CPT | Performed by: INTERNAL MEDICINE

## 2020-10-09 PROCEDURE — 250N000013 HC RX MED GY IP 250 OP 250 PS 637: Performed by: NURSE PRACTITIONER

## 2020-10-09 PROCEDURE — P9016 RBC LEUKOCYTES REDUCED: HCPCS | Performed by: INTERNAL MEDICINE

## 2020-10-09 PROCEDURE — 99207 PR CONSULT E&M CHANGED TO INITIAL LEVEL: CPT | Performed by: NURSE PRACTITIONER

## 2020-10-09 PROCEDURE — 84100 ASSAY OF PHOSPHORUS: CPT | Performed by: INTERNAL MEDICINE

## 2020-10-09 PROCEDURE — 200N000001 HC R&B ICU

## 2020-10-09 PROCEDURE — 85014 HEMATOCRIT: CPT | Performed by: ANESTHESIOLOGY

## 2020-10-09 PROCEDURE — C9113 INJ PANTOPRAZOLE SODIUM, VIA: HCPCS | Performed by: INTERNAL MEDICINE

## 2020-10-09 PROCEDURE — 86706 HEP B SURFACE ANTIBODY: CPT | Performed by: INTERNAL MEDICINE

## 2020-10-09 PROCEDURE — 85730 THROMBOPLASTIN TIME PARTIAL: CPT | Performed by: ANESTHESIOLOGY

## 2020-10-09 RX ORDER — HEPARIN SODIUM (PORCINE) LOCK FLUSH IV SOLN 100 UNIT/ML 100 UNIT/ML
5 SOLUTION INTRAVENOUS ONCE
Status: COMPLETED | OUTPATIENT
Start: 2020-10-09 | End: 2020-10-09

## 2020-10-09 RX ORDER — CALCIUM GLUCONATE 94 MG/ML
1 INJECTION, SOLUTION INTRAVENOUS ONCE
Status: DISCONTINUED | OUTPATIENT
Start: 2020-10-09 | End: 2020-10-09 | Stop reason: CLARIF

## 2020-10-09 RX ORDER — DEXTROSE MONOHYDRATE 25 G/50ML
25-50 INJECTION, SOLUTION INTRAVENOUS
Status: DISCONTINUED | OUTPATIENT
Start: 2020-10-09 | End: 2020-10-10

## 2020-10-09 RX ORDER — FENTANYL CITRATE 50 UG/ML
50 INJECTION, SOLUTION INTRAMUSCULAR; INTRAVENOUS ONCE
Status: COMPLETED | OUTPATIENT
Start: 2020-10-09 | End: 2020-10-09

## 2020-10-09 RX ORDER — HEPARIN SODIUM 1000 [USP'U]/ML
3000 INJECTION, SOLUTION INTRAVENOUS; SUBCUTANEOUS ONCE
Status: DISCONTINUED | OUTPATIENT
Start: 2020-10-09 | End: 2020-10-09

## 2020-10-09 RX ORDER — GABAPENTIN 100 MG/1
100 CAPSULE ORAL DAILY
Status: DISCONTINUED | OUTPATIENT
Start: 2020-10-09 | End: 2020-10-11

## 2020-10-09 RX ORDER — SODIUM CHLORIDE 9 MG/ML
INJECTION, SOLUTION INTRAVENOUS CONTINUOUS
Status: DISCONTINUED | OUTPATIENT
Start: 2020-10-09 | End: 2020-10-21 | Stop reason: HOSPADM

## 2020-10-09 RX ORDER — HEPARIN SODIUM,PORCINE 10 UNIT/ML
2-5 VIAL (ML) INTRAVENOUS
Status: ACTIVE | OUTPATIENT
Start: 2020-10-09 | End: 2020-10-12

## 2020-10-09 RX ORDER — ALBUMIN, HUMAN INJ 5% 5 %
100 SOLUTION INTRAVENOUS
Status: DISCONTINUED | OUTPATIENT
Start: 2020-10-09 | End: 2020-10-09 | Stop reason: CLARIF

## 2020-10-09 RX ORDER — DEXTROSE MONOHYDRATE 100 MG/ML
INJECTION, SOLUTION INTRAVENOUS CONTINUOUS PRN
Status: DISCONTINUED | OUTPATIENT
Start: 2020-10-09 | End: 2020-10-21 | Stop reason: HOSPADM

## 2020-10-09 RX ORDER — ALBUMIN, HUMAN INJ 5% 5 %
100 SOLUTION INTRAVENOUS ONCE
Status: DISCONTINUED | OUTPATIENT
Start: 2020-10-09 | End: 2020-10-09 | Stop reason: CLARIF

## 2020-10-09 RX ORDER — NICOTINE POLACRILEX 4 MG
15-30 LOZENGE BUCCAL
Status: DISCONTINUED | OUTPATIENT
Start: 2020-10-09 | End: 2020-10-10

## 2020-10-09 RX ORDER — HYDROMORPHONE HYDROCHLORIDE 1 MG/ML
.4-.6 INJECTION, SOLUTION INTRAMUSCULAR; INTRAVENOUS; SUBCUTANEOUS
Status: DISCONTINUED | OUTPATIENT
Start: 2020-10-09 | End: 2020-10-13

## 2020-10-09 RX ORDER — LIDOCAINE 40 MG/G
CREAM TOPICAL
Status: ACTIVE | OUTPATIENT
Start: 2020-10-09 | End: 2020-10-12

## 2020-10-09 RX ORDER — HYDROMORPHONE HYDROCHLORIDE 1 MG/ML
0.2 INJECTION, SOLUTION INTRAMUSCULAR; INTRAVENOUS; SUBCUTANEOUS
Status: DISCONTINUED | OUTPATIENT
Start: 2020-10-09 | End: 2020-10-09

## 2020-10-09 RX ORDER — LIDOCAINE 40 MG/G
CREAM TOPICAL
Status: DISCONTINUED | OUTPATIENT
Start: 2020-10-09 | End: 2020-10-09

## 2020-10-09 RX ORDER — DEXTROSE MONOHYDRATE 25 G/50ML
50 INJECTION, SOLUTION INTRAVENOUS ONCE
Status: COMPLETED | OUTPATIENT
Start: 2020-10-09 | End: 2020-10-09

## 2020-10-09 RX ORDER — ACETAMINOPHEN 325 MG/1
975 TABLET ORAL 3 TIMES DAILY
Status: DISCONTINUED | OUTPATIENT
Start: 2020-10-09 | End: 2020-10-21 | Stop reason: HOSPADM

## 2020-10-09 RX ORDER — HEPARIN SODIUM,PORCINE 10 UNIT/ML
5 VIAL (ML) INTRAVENOUS ONCE
Status: DISCONTINUED | OUTPATIENT
Start: 2020-10-09 | End: 2020-10-09

## 2020-10-09 RX ADMIN — CEFTRIAXONE 1 G: 1 INJECTION, POWDER, FOR SOLUTION INTRAMUSCULAR; INTRAVENOUS at 15:18

## 2020-10-09 RX ADMIN — SODIUM BICARBONATE 50 MEQ: 84 INJECTION INTRAVENOUS at 02:59

## 2020-10-09 RX ADMIN — HYDROMORPHONE HYDROCHLORIDE 2 MG: 2 TABLET ORAL at 08:31

## 2020-10-09 RX ADMIN — HYDROMORPHONE HYDROCHLORIDE 4 MG: 2 TABLET ORAL at 12:14

## 2020-10-09 RX ADMIN — CALCIUM GLUCONATE 1 G: 98 INJECTION, SOLUTION INTRAVENOUS at 03:05

## 2020-10-09 RX ADMIN — HYDROMORPHONE HYDROCHLORIDE 2 MG: 2 TABLET ORAL at 05:52

## 2020-10-09 RX ADMIN — Medication: at 08:31

## 2020-10-09 RX ADMIN — SODIUM CHLORIDE: 9 INJECTION, SOLUTION INTRAVENOUS at 13:53

## 2020-10-09 RX ADMIN — SIMVASTATIN 10 MG: 10 TABLET, FILM COATED ORAL at 21:39

## 2020-10-09 RX ADMIN — HYDROMORPHONE HYDROCHLORIDE 0.2 MG: 1 INJECTION, SOLUTION INTRAMUSCULAR; INTRAVENOUS; SUBCUTANEOUS at 11:09

## 2020-10-09 RX ADMIN — PANTOPRAZOLE SODIUM 40 MG: 40 INJECTION, POWDER, FOR SOLUTION INTRAVENOUS at 14:11

## 2020-10-09 RX ADMIN — HEPARIN 5 ML: 100 SYRINGE at 05:03

## 2020-10-09 RX ADMIN — Medication 10 UNITS: at 02:53

## 2020-10-09 RX ADMIN — Medication 5 MG: at 21:39

## 2020-10-09 RX ADMIN — HYDROMORPHONE HYDROCHLORIDE 0.2 MG: 1 INJECTION, SOLUTION INTRAMUSCULAR; INTRAVENOUS; SUBCUTANEOUS at 15:13

## 2020-10-09 RX ADMIN — FENTANYL CITRATE 25 MCG: 50 INJECTION, SOLUTION INTRAMUSCULAR; INTRAVENOUS at 04:16

## 2020-10-09 RX ADMIN — Medication: at 06:21

## 2020-10-09 RX ADMIN — ACETAMINOPHEN 650 MG: 325 TABLET, FILM COATED ORAL at 08:31

## 2020-10-09 RX ADMIN — SODIUM CHLORIDE 300 ML: 9 INJECTION, SOLUTION INTRAVENOUS at 06:21

## 2020-10-09 RX ADMIN — Medication 0.2 UNITS/HR: at 01:35

## 2020-10-09 RX ADMIN — SODIUM CHLORIDE 250 ML: 9 INJECTION, SOLUTION INTRAVENOUS at 06:21

## 2020-10-09 RX ADMIN — DEXTROSE MONOHYDRATE 50 ML: 25 INJECTION, SOLUTION INTRAVENOUS at 02:53

## 2020-10-09 RX ADMIN — HEPARIN 5 ML: 100 SYRINGE at 04:20

## 2020-10-09 RX ADMIN — SODIUM BICARBONATE: 84 INJECTION, SOLUTION INTRAVENOUS at 06:01

## 2020-10-09 RX ADMIN — ACETAMINOPHEN 975 MG: 325 TABLET, FILM COATED ORAL at 16:43

## 2020-10-09 RX ADMIN — GABAPENTIN 100 MG: 100 CAPSULE ORAL at 16:43

## 2020-10-09 RX ADMIN — ACETAMINOPHEN 975 MG: 325 TABLET, FILM COATED ORAL at 21:39

## 2020-10-09 ASSESSMENT — ACTIVITIES OF DAILY LIVING (ADL)
ADLS_ACUITY_SCORE: 22
ADLS_ACUITY_SCORE: 22
ADLS_ACUITY_SCORE: 25
ADLS_ACUITY_SCORE: 23
ADLS_ACUITY_SCORE: 25
ADLS_ACUITY_SCORE: 17

## 2020-10-09 NOTE — PROVIDER NOTIFICATION
Dr. Hinson at bedside. Updated on lack of UO with davis since arrival to unit. Soy ATKINS stated davis was emptied on previous unit even though nothing was charged, but unclear as to how much. Dr. Hinson said likely d/t current renal function and no other interventions to be placed tonight to facilitate UO. Will continue to monitor.

## 2020-10-09 NOTE — ED NOTES
Paged to transfer pt to ICU and give bicarb. PIV on R AC flushed with good blood return. started pushing bicarb slowly, a few CC given and stopped with pt discomfort. Stopped, Started 2 new PIVs and a new bicarb ordered and pushed. Pt reporting pain an discomfort to the infiltration site. MD aware, Ice applied to site, primary RN aware.

## 2020-10-09 NOTE — PROGRESS NOTES
Nephrology Progress Note  10/09/2020         IMPRESSION:   1.  Apparent baseline chronic kidney disease.  Limited historical data for review, but most recent creatinine is in the range of approximately 1.0 dated 2017.  Estimated GFR in the range of approximately 60 mL per minute, representing at least stage III chronic kidney disease.  Unclear if underlying disease progression exists.  The exact renal baseline unknown.   2.  Previously documented microalbuminuria with an albumin to creatinine ratio from 11/2017 at 70 mg/gram.   3.  Hypertension and type 2 diabetes as contributing factors to above.   4.  Acute kidney injury with presenting creatinine of 2.75.  This is in the setting of recent several day illness, perhaps poor p.o. intake and ongoing use of lisinopril, furosemide.  I suspect she is either prerenal or has evolved to acute tubular injury.   5.  Significant metabolic acidosis -with renal failure.  Patient was on metformin but lactate was normal .   6.  Significant hyperkalemia with presenting potassium 7.9.  This is multifactorial in the setting of acute kidney injury, metabolic acidosis and ongoing treatment with lisinopril, beta blocker as well as potassium supplementation.   -Intractable hyperkalemia which did not respond to multiple rounds of lowering medications.  7.  Type 2 diabetes with apparent diabetic nephropathy.   8.  History of hypertension.   9.  Relative hypotension at this time with blood pressures in the range of 90 systolic and a heart rate of 114.   10.  Anticoagulated with Eliquis.   11.  Outpatient management of rhythm with digoxin.   12.  Lymphedema with chronic stasis changes.   13.  Morbid obesity.   14 -severe anemia-> normocytic, hypochromic.  Blood transfusion per ICU team.      -Dialyzed this morning through temporary IJ catheter.  3 hours and 1K bath but with lower blood flow rate of 200 secondary to severe muscle cramps and low blood pressure.  -Recheck BMP and treat  hyperkalemia medically if potassium is still high.  -Patient reports she is not going to agree to another round of dialysis as he was miserable on it.  Discussed importance of it if her kidney function does not improve.  She is making some urine.  We will have to reassess lab and urine output over next 24 hours to decide on exception of dialysis.  -Volume status is hard to assess due to body habitus .  Massive lower extremity lymphedema but prerenal by history.  Okay with gentle volume expansion.  Okay with continuing bicarb drip until next lab work.  Fluid completion can be changed based on the lab work.  Recommendations were communicated to primary team in person  -Check iron panel, serum and urine immunofixation as well as serum free light chains.    Yadira Buckley MD  [facility] - Nephrology   [phone]      Interval History :   Seen / examined.   Just completed dialysis.  Was miserable throughout dialysis with severe muscle cramp.  Blood pressure in low but did not require pressors or albumin.  Blood flow rate was decreased to 200 secondary to low blood pressure.  Patient reports feeling miserable.  Describes lower extremity pain as well as cramping.  No dyspnea.  Afebrile.  Remains tachycardic.    Review of Systems:   A 10point review of systems was negative except as noted above.      Physical Exam:   I/O last 3 completed shifts:  In: 1596.7 [P.O.:480; I.V.:1116.7]  Out: 20 [Urine:20]    GENERAL APPEARANCE: alert and no distress  EYES:  no scleral icterus, pupils equal  PULM: Clear anteriorly.  No cyanosis.  CV: Irregular, tachycardic     -JVP -could not assess     -Massive lymphedema with weeping ulcers bilaterally.  GI: soft, non tender,   NEURO: mentation intact and speech normal,    Access -right IJ temporary catheter placed on 10/9/2020    Labs:   All labs reviewed by me  Electrolytes/Renal -   Recent Labs   Lab Test 10/09/20  0548 10/09/20  0332 10/09/20  0258 10/09/20  0051 10/08/20  2043     138  --  136 133   POTASSIUM 6.4* 6.4* 6.8* 7.1* 7.5*   CHLORIDE 113* 114*  --  113* 112*   CO2 17* 17*  --  18* 13*   BUN 83* 83*  --  85* 80*   CR 3.11* 3.25*  --  3.27* 3.14*   * 145*  --  129* 90   LEE 8.2* 8.2*  --  8.3* 8.4*   MAG 2.0  --   --   --  2.2   PHOS 5.5*  --   --   --  5.7*       CBC -   Recent Labs   Lab Test 10/09/20  0548 10/08/20  2043   WBC 8.1 11.3*   HGB 6.1* 7.2*    431       LFTs -   Recent Labs   Lab Test 10/08/20  2043   ALKPHOS 97   BILITOTAL 0.3   ALT 13   AST 12   PROTTOTAL 6.5*   ALBUMIN 2.5*     Current Medications:    cefTRIAXone  1 g Intravenous Q24H     cetirizine  10 mg Oral Daily     fluticasone  1 puff Inhalation Daily     ibrutinib  560 mg Oral At Bedtime     midazolam  1 mg Intravenous Once     oxybutynin  5 mg Oral BID     pantoprazole (PROTONIX) IV  40 mg Intravenous Daily with breakfast     simvastatin  10 mg Oral At Bedtime     sodium chloride (PF)  3 mL Intracatheter Q8H       IV infusion builder WITH additives 75 mL/hr at 10/09/20 1223     dextrose       insulin (regular) Stopped (10/09/20 1223)     Yadira Buckley MD

## 2020-10-09 NOTE — PHARMACY-ADMISSION MEDICATION HISTORY
Admission medication history interview status for this patient is complete. See [facility] admission navigator for allergy information, prior to admission medications and immunization status.     Medication history interview done via telephone during Covid-19 pandemic, indicate source(s): Patient  Medication history resources (including written lists, pill bottles, clinic record):None      Changes made to PTA medication list:  Added: zyrtec, methyl salicylate cream, lantus, MVI, zanaflex  Deleted: vitamin E, zantac, tamiflu, kelfex  Changed: dulcolax, lasix, imbruvica (updated dose to 560mg daily)    Actions taken by pharmacist (provider contacted, etc):None     Additional medication history information:None    Medication reconciliation/reorder completed by provider prior to medication history?  n   (Y/N)     For patients on insulin therapy: Yes, but patient is too tired to continue with further interview.  Do you use sliding scale insulin based on blood sugars?   What is your pre-meal insulin coverage?    Do you typically eat three meals a day?   How many times do you check your blood glucose per day?   How many episodes of hypoglycemia do you typically have per month?   Do you have a Continuous Glucose Monitor (CGM)?      Prior to Admission medications    Medication Sig Last Dose Taking? Auth Provider   albuterol (PROAIR HFA/PROVENTIL HFA/VENTOLIN HFA) 108 (90 BASE) MCG/ACT Inhaler Inhale 2 puffs into the lungs every 4 hours as needed for shortness of breath / dyspnea or wheezing 10/8/2020 at Unknown time Yes Reported, Patient   apixaban ANTICOAGULANT (ELIQUIS) 5 MG tablet Take 5 mg by mouth 2 times daily 10/8/2020 at am Yes Reported, Patient   aspirin 81 MG EC tablet Take 81 mg by mouth daily 10/8/2020 at Unknown time Yes Reported, Patient   bisacodyl (DULCOLAX) 5 MG EC tablet Take 5 mg by mouth daily  10/8/2020 at Unknown time Yes Reported, Patient   cetirizine HCl (ZYRTEC ALLERGY) 10 MG CAPS Take 10 mg by mouth daily   10/8/2020 at Unknown time Yes Reported, Patient   digoxin (LANOXIN) 125 MCG tablet Take 125 mcg by mouth daily 10/8/2020 at Unknown time Yes Reported, Patient   fluticasone (FLOVENT HFA) 220 MCG/ACT Inhaler Inhale 1 puff into the lungs 2 times daily Past Week at Unknown time Yes Reported, Patient   furosemide (LASIX) 40 MG tablet Take 80 mg by mouth daily  10/8/2020 at Unknown time Yes Reported, Patient   GLUCOSAMINE-CHONDROITIN PO Take 2 capsules by mouth 2 times daily 10/8/2020 at am Yes Reported, Patient   ibrutinib (IMBRUVICA) 140 MG capsule Take 140 mg by mouth daily  10/7/2020 at hs Yes Reported, Patient   insulin glargine (LANTUS SOLOSTAR) 100 UNIT/ML injection Inject 35 Units Subcutaneous At Bedtime  10/7/2020 at Unknown time Yes Reported, Patient   lisinopril (PRINIVIL/ZESTRIL) 10 MG tablet Take 10 mg by mouth daily 10/8/2020 at Unknown time Yes Reported, Patient   metFORMIN (GLUCOPHAGE) 500 MG tablet Take 500 mg by mouth 2 times daily (with meals) 10/8/2020 at am Yes Reported, Patient   METHYL SALICYLATE EX Externally apply topically 2 times daily   Yes Reported, Patient   metoprolol (LOPRESSOR) 50 MG tablet Take 25 mg by mouth 2 times daily 10/8/2020 at am Yes Reported, Patient   Multiple Vitamins-Minerals (MULTIVITAMIN ADULT PO) Take 1 tablet by mouth daily  10/8/2020 at Unknown time Yes Reported, Patient   oxybutynin (DITROPAN) 5 MG tablet Take 5 mg by mouth 2 times daily Past Week at Unknown time Yes Reported, Patient   pioglitazone (ACTOS) 30 MG tablet Take 30 mg by mouth daily 10/8/2020 at Unknown time Yes Reported, Patient   potassium chloride (KLOR-CON) 20 MEQ Packet Take 20 mEq by mouth 2 times daily 10/8/2020 at am Yes Reported, Patient   simvastatin (ZOCOR) 10 MG tablet Take 10 mg by mouth At Bedtime 10/7/2020 at Unknown time Yes Reported, Patient   SOY ISOFLAVONE PO Take 198 mg by mouth daily 10/8/2020 at Unknown time Yes Reported, Patient   TiZANidine HCl (ZANAFLEX) 2 MG CAPS Take 2 mg by  mouth 4 times daily as needed  10/8/2020 at am Yes Reported, Patient

## 2020-10-09 NOTE — PROVIDER NOTIFICATION
Dr. Anderson notified of low blood pressure and map. Await response.  Yola Frias RN    [id number]: Dr Anderson responded. Monitor blood pressure. Wait to see if blood transfusion helps. Call if further questions, may need fluid bolus later.  Yola Frias RN

## 2020-10-09 NOTE — PROVIDER NOTIFICATION
DATE:  10/9/2020   TIME OF RECEIPT FROM LAB:  0120 received by Rosy LEA RN  LAB TEST:  K   LAB VALUE:  7.1  RESULTS GIVEN WITH READ-BACK TO (PROVIDER): Tele ICU   TIME LAB VALUE REPORTED TO PROVIDER:   0140    Tele ICU to call back when provider available.     0230 Addendum: Called Tele ICU again. Stated would speak to Nephrology about placing a dialysis access since previous Kayexalate did not decrease K very much. Insulin gtt running about 2 hrs. MD ordered calcium gluconate, D50, and insulin to try to shift pt. Sodium bicarb also ordered. Pt's HR also irregular 90's-110's. Pt states normal for her. EKG ordered.    0300 Addendum: EKG faxed to Tele ICU. Tele ICU stated would speak to Nephrology about dialysis. Requested to speak to Hospitalist about potential transfer for CRRT vs dialysis. Waiting to here back.    6953 Addendum: ED MD to come up to place dialysis access per Tele ICU.

## 2020-10-09 NOTE — PROGRESS NOTES
WO Nurse Inpatient Wound Assessment   Reason for consultation: Evaluate and treat  LE wounds    Assessment  LEwounds due to Lymphedema  Status: initial assessment and deteriorating    Treatment Plan  LE wounds: Daily :    Davy RECINOS'S  1. Cleanse with wound cleanser, dry  2. Adaptic to any open or blistered areas  3. Kerlix wrap to secure  4. Air flow chux under neath  5. Lymphedema wraps to follow if ordered per MD  6. Vascular consult per MD    Orders Written  Recommended provider order: Lymphedema consult  WO Nurse follow-up plan:weekly  Nursing to notify the Provider(s) and re-consult the WOC Nurse if wound(s) deteriorates or new skin concern.    Patient History  According to provider note(s):  10/09  Description of event:  -Patient is 75-year-old female who presented to the [facility] with worsening of her chronic kidney disease, and hyperkalemia and severe metabolic acidosis.    Objective Data  Containment of urine/stool: Indwelling catheter    Active Diet Order  Orders Placed This Encounter      NPO per Anesthesia Guidelines for Procedure/Surgery Except for: Meds      Output:   I/O last 3 completed shifts:  In: 1596.7 [P.O.:480; I.V.:1116.7]  Out: 20 [Urine:20]    Risk Assessment:   Sensory Perception: 2-->very limited  Moisture: 3-->occasionally moist  Activity: 1-->bedfast  Mobility: 2-->very limited  Nutrition: 3-->adequate  Friction and Shear: 1-->problem  Mario Score: 12                          Labs:   Recent Labs   Lab 10/09/20  0548 10/08/20  2043   ALBUMIN  --  2.5*   HGB 6.1* 7.2*   WBC 8.1 11.3*   A1C  --  Canceled, Test credited       Physical Exam  Wound Location:  Davy RICHARDs  Date of last photo 10/09  Left LE                                                                RLE    Wound History: chronic, states she was to start at a new Wound clinic from [facility] today.  She states she has had a Unna Boot in the past and compression wraps at home    Wound Base: PARTIAL THICKNESS SPLIT OPENINGS WITH  BLISTER CLUSTERS      Palpation of the wound bed: firm      Drainage: moderate     Description of drainage: serous     Tunneling N/A     Undermining N/A  Periwound skin: blister(s), dry/scaly, erythema- blanchable, hemosiderin staining and superficial erosion        Temperature: warm  Edema 4+  Unable to palpate pedal pulses  Nails thickened and discolored  States has sensation to feet  Scaling skn  Odor: mild  Pain: severe, with movement    Wound #2:  Superficial bi buttocks wounds, nursing has Mepilex border dressings in place,  Pt. States is too painful to turn--not observed today    Nursing has PIP measures in place as well      Interventions  Visual inspection and assessment completed yes, nursing states buttocks wounds are superficial and cares are placed with Mepilex border dressings  Wound Care Rationale Promote moist wound healing without tissue dehydration   Current off-loading measures: Pillows  Current support surface: Standard  Atmos Air mattress  Education provided to: plan of care, wound progress and Off-loading pressure  Discussed plan of care with Patient and Nurse    easton Qureshi

## 2020-10-09 NOTE — PROGRESS NOTES
Potassium   Date Value Ref Range Status   10/09/2020 6.4 (HH) 3.4 - 5.3 mmol/L Final     Comment:     Critical Value called to and read back by  Corina Hatch (ICU) on 10.09.20 at 0404 by ND       Hemoglobin   Date Value Ref Range Status   10/08/2020 7.2 (L) 11.7 - 15.7 g/dL Final     Creatinine   Date Value Ref Range Status   10/09/2020 3.25 (H) 0.52 - 1.04 mg/dL Final     Urea Nitrogen   Date Value Ref Range Status   10/09/2020 83 (H) 7 - 30 mg/dL Final     Sodium   Date Value Ref Range Status   10/09/2020 138 133 - 144 mmol/L Final     No results found for: INR    DIALYSIS PROCEDURE NOTE  Hepatitis status of previous patient on machine log was checked and verified ok to use with this patients hepatitis status.  Patient dialyzed for 3 hrs. on a 1 K bath with a net fluid removal of  0L.  A BFR of 250 ml/min was obtained via a RIJ.    The treatment plan was discussed with Dr. HEIDY Nielson during the treatment.  Total heparin received during the treatment: 0 units.   Incapacitated Nurse education completed, Machine test alarm pass at 0504.  Meds  given: None Complications: None    Person educated patient, Knowledge base none,Barriers to learning none , Educated on procedure mode oral, patient verbalized/demonstrated understanding. Pt prefers oral education style.   Skin Assessment pre run: warm, moist, intact, bilateral legs both red blanchable and non-blanchable in areas and weeping . Pre run assessment of edema bilateral +2 edema in hips, bilateral +3 edema in legs, ankle and feet.  ICEBOAT? Timeout performed pre-treatment  I: Patient was identified using 2 identifiers  C: Consent obtained/verified current before treatment  E: Equipment preventative maintenance is current and dialysis delivery system OK to use  B: Hepatitis B Surface Antigen: unknown; Draw Date: 10.9.20      Hepatitis B Surface Antibody: unknown; Draw Date: 10.9.20  O: Dialysis orders present and complete prior to treatment  A: Vascular access verified  and assessed prior to treatment  T: Treatment was performed at a clinically appropriate time  ?: Patient was allowed to ask questions and address concerns prior to treatment  See flowsheet in EPIC for further details and post assessment.  Machine water alarm in place and functioning. Transducer pods intact and checked every 15min.  Report received from: FATOU Roberts RN  Report given to: NEYDA Carlos RN (Dialysis nurse)  Chlorine/Chloramine water system checked every 4 hours.  Outpatient Dialysis at [facility]

## 2020-10-09 NOTE — CONSULTS
Care Management Initial Consult    General Information  Assessment completed with:: Patient,    Type of CM/SW Visit: Initial Assessment     Readmission Within the Last 30 Days: no previous admission in last 30 days     Reason for Consult: discharge planning  Advance Care Planning:            Communication Assessment  Patient's communication style: spoken language (English or Bilingual)         Cognitive  Cognitive/Neuro/Behavioral: WDL                      Living Environment:   People in home: child(mehreen), adult  Name(s) of People in Home: Bharat  Current living Arrangements: mobile home(ramp for enterance )          Family/Social Support:  Care provided by: self, other (see comments)(has Private pay homemaker m-f 9-11)  Provides care for: no one  Marital Status:   Who is your support system?: Children, Sibling(s)     Description of Support System: Supportive, Involved  Description of Support System: Supportive, Involved  Adequate family and caregiver support, Adequate social supports        Description of Support System: Supportive, Involved  Support Assessment: Adequate family and caregiver support, Adequate social supports    Current Resources:   Skilled Home Care Services:    Community Resources: Housekeeping/Chore Agency- Pt pays privately for a cleaning lady that she found on her own. This individual is at her home M-F from 9-11 and will assist with batching, cleaning, shopping and meals   Equipment currently used at home: wheelchair, manual, walker, rolling(Ramp)  Supplies currently used at home: N/A      Employment:  Employment Status: retired        Financial/Environmental Concerns:            Lifestyle & Psychosocial Needs:        Socioeconomic History     Marital status:      Spouse name: Not on file     Number of children: Not on file     Years of education: Not on file     Highest education level: Not on file          Functional Status:  Prior to admission patient needed assistance: Meal  preparation, Laundry/Housekeeping, Shopping, Transportation   Assesssment of Functional Status: Not at baseline with ADL Functioning, Not at baseline with mobility, Not at  functional baseline                      Vaues/Beliefs:  Spiritual, Cultural Beliefs, [employer]ian Practices, Values that affect care: PT is [place]. Pt would appreciate a radha visit while Hospitalized as she has not been able to attend Worship for a long time.            Additional Information:  Met with pt. She is frustrated with her son Bharat ( He is POA for Finances only ). HE is trying to convince pt to accpt Dialysis . Pt does not want to have this any further then today's treatment. Pt  Is not open to any other outside support other then her private .,PT did at one point check into services through the [facility] but made the decision to not utilize this support     SW spoke with pt about her admission and illness. She is aware that should she need further dialysis but does not accept this that it could increase her health issues and possible put her in a Hospice supported plan. Pt has chronic pain in her legs and feet... Pt stated that maybe tomorrow she would think differently. She was very clear that her sons eLn and Beny have her medical HCD at home.. Her HCD is also on file at [facility]   SW will leonidas son to see if we can obtain copy for [facility] records,.      At this time d.c needs are not clear. PT is open to the idea of accepting home care support.   Pt does live in [place] but attends an [facility]     Corinne White LSW  Inpatient Care Coordination   [phone]  [facility]       Corinne C. White, LSW

## 2020-10-09 NOTE — PROGRESS NOTES
An EKG was completed on the pt, with no complications noted during the procedure. EKG transmitted and a copy given to SILVESTRE Lynch RT

## 2020-10-09 NOTE — PLAN OF CARE
ICU End of Shift Summary.  For vital signs and complete assessments, please see documentation flowsheets.     Pertinent assessments: Patient alert and oriented. Remains in atrial fib. Wound care done.  Kramer in place. Tolerting oral intake  Major Shift Events: dialysis today with improved labs. Received blood. Wound care done. Picc line placed. Palliatvie care ocnsulted.  Plan (Upcoming Events): dialysis. Wound care.monitor labs  Discharge/Transfer Needs: to be determined    Bedside Shift Report Completed : yes  Bedside Safety Check Completed:yes    Yola Frias RN

## 2020-10-09 NOTE — PROGRESS NOTES
Tele hub called with continued low maps. MD will assess at 1900 when available. Will continue to monitor.  Yola Frias RN

## 2020-10-09 NOTE — PLAN OF CARE
OT/PT: Chart reviewed, pt admitted last night  With Hyperkalemia, EVER on at least CKD stage III, NAGMA. Possible UTI.  Will hold OT/PT eval today to allow for medical management to allow for optimal eval with recommendations.

## 2020-10-09 NOTE — PROGRESS NOTES
[facility]  Hospitalist Progress Note  Lorri Betancur 10/09/20    Reason for Stay (Diagnosis): hyperkalemia, EVER         Assessment and Plan:      Summary of Stay: Waleska Marie is a 75 year old female with past medical history of CKD III, IDDM type II, AF (on Eliquis), COPD, asthma, mantle cell lymphoma, and severe lymphoma who was admitted on 10/8/2020. She initially presented to an outside hospital with increased leg pain and swelling and was found to have EVER with severe non-anion gap metabolic acidosis and severe hyperkalemia with EKG changes.     She was transferred to the ICU and treated with potassium shifting medications without significant improvement of her hyperkalemia. Nephrology was consulted, and she received dialysis the morning of 10/9 with resolution of her hyperkalemia. She was noted to have significant anemia on 10/9 and received transfusion of 1 pRBC.     Problem List/Assessment and Plan:   Hyperkalemia, EVER on at least CKD stage III, NAGMA: Presented to outside facility with creatinine 2.75, potassium 7.9, bicarb 12 without elevated anion gap.  Last creatinine on file from 3 years ago was 1.3.  EKG showed slightly peaked T waves along with LBBB.  She is on lisinopril, Lasix, metformin, and potassium 20 mEq twice daily at baseline which are all likely to her EVER, hyperkalemia, and NAGMA. Urinalysis was grossly abnormal with large blood, moderate LE, positive nitrite, 100 protein, and > 100 WBC and RBCs.  Possible UTI as below.  She was shifted twice with IV insulin/D50 at outside facility and also received 40 mg IV Lasix, 2 L IV normal saline, multiple albuterol nebs, and calcium gluconate 2 g x 2.  Repeat potassium was 7.4 and she was transferred here.  On admission potassium was 7.5, creatinine up to 3.4, bicarbonate 13.  Seems to be tolerating the higher potassium surprisingly well, this along with phosphorus level of 6.5 could point towards more chronicity to her  worsening renal dysfunction. Nephrology consulted and hyperkalemia resolved on 10/9 following dialysis.   - AM BMP  -Telemetry  -Kramer catheter for strict intake and output  -CHO/renal diet      Hypotension: Blood pressure softer in the 80-90 systolic range following dialysis.  She is alert and oriented. Lactic acid 0.9. Despite her lymphedema she is likely intravascularly dry. She was volume resusitated with 25 g 25% IV albumin and starting bicarbonate infusion at 150 ml/hr overnight, now transitioned TKO with normalization of her electrolytes and tolerating PO.  - Continue to monitor     Possible UTI: Urinalysis grossly abnormal with > 100 WBCs large blood, moderate LE, and positive nitrite. Slight leukocytosis on admission (up to 11.3), now downtrending. Urine culture pending. Afebrile.  UTI in the differential, however no symptoms for this.  Received ceftriaxone at outside facility.  -Continue ceftriaxone 1 g every 24 hours  -Follow urine culture results     Lymphedema: Severe lymphedema is a chronic issue for the patient.  Resulting in blistering of the skin and weeping.  Has chronic slight erythema to both legs from the feet all the way up to the hip.  Difficult to say if any infection here, she does not it appears different than her baseline.  Takes 40 mg Lasix daily for this.  TTE from 2017 does not showed , in the past had some reduction in EF of 55-60%, slight elevated right-sided pressures, and diastolic function was indeterminate.  Per the TTE report, she previously had lower EF.  -Holding diuretics as she appears intravascularly dry as above  -For pain, acetaminophene 650 mg q4h PO, Dilaudid 2-4 mg q4h PO, Dilaudid 0.2 mg q2h IV  -Wound nurse consult  -PT lymphedema consult     Morbid obesity: Weight is 304 lb, a fair amount of this is fluid.     A. fib, LBBB, HTN: Chronically on Eliquis for anticoagulation.  Also takes metoprolol tartrate 25 mg twice daily and digoxin 125 mcg daily.  Mildly tachycardic  here with heart rate 100.  EKG shows LBBB, unclear if new or not. Digoxin level 1.2, WNL. Holding metoprolol and digoxin for now.  - Telemetry  - Hold Eliquis and aspirin for now, will resume pending repeat hgb  - Hold digoxin and metoprolol given hypotension    IDDM type II: PTA on 35 units Lantus at bedtime along with metformin 500 mg twice daily.  Blood glucose here has been between .   -Continue insulin drip     Normocytic anemia: Hemoglobin 7.7 initially then 6.1 this morning after IV fluids.  Hemoglobin previously 8-9 range in 2017.  Denies any bloody stools or other blood loss.  This is in part due anemia of CKD and iron deficiency anemia.  Also, likely has chronic inflammation from her lymphedema with chronic wounds. Consented for transfusion today.   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  Checked up-to-date and there is no renal dosing change for this dose as it is minimally excreted in the urine.  Resumed PTA dose.     COPD, asthma: Remote history of smoking.  She reports asthma.  PTA on Flovent and albuterol inhaler.  She has chronic shortness of breath, but this is worse the past 5 days.  Chest x-ray showed cardiomegaly, but no infiltrate.  -Resume PTA Flovent, duonebs as needed     Urinary incontinence: Resume oxybutynin 5 mg twice daily.     HLD: CK level normal.  Resume simvastatin 10 mg daily.    Diet: NPO per Anesthesia Guidelines for Procedure/Surgery Except for: Meds    DVT Prophylaxis: Pneumatic Compression Devices, Eliquis once hgb stable  Kramer Catheter: in place, indication: Strict 1-2 Hour I&O  Code Status: Full Code      Disposition Plan   Expected discharge: TBD, requiring ICU level cares.   Entered: Lorri Betancur 10/09/2020, 7:40 AM       The patient's care was discussed with the Attending Physician, Dr. Anderson, Bedside Nurse, Patient, Primary team and Intensivist.    Hospitalist  "[facility]        Interval History (Subjective):      Charts reviewed and patient discussed with nursing. Patient just completed dialysis, reports that she never wants to go through that again. She has diffuse myalgias with any movement.                   Physical Exam:      Last Vital Signs:  BP 95/44   Pulse 113   Temp 97.6  F (36.4  C) (Oral)   Resp 18   Ht 1.778 m (5' 10\")   Wt 138.2 kg (304 lb 10.8 oz)   SpO2 96%   BMI 43.72 kg/m      I/O last 3 completed shifts:  In: 2692.07 [P.O.:480; I.V.:2212.07]  Out: 345 [Urine:345]    General: Alert, awake, complains of significant pain with any movement.  HEENT: Normocephalic and atraumatic, eyes anicteric, EOMI, face symmetric, MMM. There is crusting around the right eye.   Cardiac: RRR, normal S1, S2. No m/g/r, no LE edema.  Pulmonary: Normal chest rise, normal work of breathing.  Lungs CTAB without crackles or wheezing.  Abdomen: Soft, non-tender, non-distended.  Normoactive bowel sounds, no guarding or rebound tenderness. : Kramer in place with dark urine.   Extremities: No deformities.  Warm, well perfused.  Skin: Severe lymphedema of the bilateral LE.   Neuro: No focal deficits.  Speech clear.  Coordination and strength grossly normal.  Psych: Alert and oriented x3. Appropriate affect.         Medications:      All current medications were reviewed with changes reflected in problem list.         Data:      All new lab and imaging data was reviewed.   Labs:  No results for input(s): CULT in the last 168 hours.  Recent Labs   Lab 10/09/20  1315 10/09/20  0548 10/09/20  0332    137 138   POTASSIUM 4.5 6.4* 6.4*   CHLORIDE 105 113* 114*   CO2 24 17* 17*   ANIONGAP 8 7 7   GLC 88 111* 145*   BUN 45* 83* 83*   CR 2.13* 3.11* 3.25*   GFRESTIMATED 22* 14* 13*   GFRESTBLACK 26* 16* 15*   LEE 7.3* 8.2* 8.2*     Recent Labs   Lab 10/09/20  1315 10/09/20  0548 10/08/20  2043   WBC  --  8.1 11.3*   HGB 5.8* 6.1* 7.2*   HCT  --  " 21.3* 25.9*   MCV  --  96 97   PLT  --  385 431      Imaging:   Recent Results (from the past 48 hour(s))   XR Chest Port 1 View    Narrative    EXAM: XR CHEST PORT 1 VW  LOCATION: [facility]  DATE/TIME: 10/9/2020 4:20 AM    INDICATION: Line placement.  COMPARISON: None.    FINDINGS: Supine portable chest. Right IJ central venous catheter in place with tip projecting over the distal SVC. There is no pneumothorax. The heart is at the upper limits normal in size. No pulmonary edema. Thoracic aorta is calcified and mildly   tortuous. Mild left basilar atelectasis or scar. The lungs are otherwise clear.      Impression    IMPRESSION: No pneumothorax post central line placement.                XR Chest Port 1 View    Narrative    XR CHEST PORT 1 VW 10/9/2020 12:43 PM    HISTORY: picc line placement    COMPARISON: Radiograph of earlier today      Impression    IMPRESSION: Interval placement of a left arm PICC with tip at the  SVC/right atrial junction. Right IJ central venous catheter tip is in  the high right atrium. Stable mild cardiomegaly. No focal pulmonary  infiltrate, pleural effusion or pneumothorax. Tortuous aorta.    BELIA GAGE MD     Recent Results (from the past 24 hour(s))   XR Chest Port 1 View    Narrative    EXAM: XR CHEST PORT 1 VW  LOCATION: [facility]  DATE/TIME: 10/9/2020 4:20 AM    INDICATION: Line placement.  COMPARISON: None.    FINDINGS: Supine portable chest. Right IJ central venous catheter in place with tip projecting over the distal SVC. There is no pneumothorax. The heart is at the upper limits normal in size. No pulmonary edema. Thoracic aorta is calcified and mildly   tortuous. Mild left basilar atelectasis or scar. The lungs are otherwise clear.      Impression    IMPRESSION: No pneumothorax post central line placement.                    Lorri Betancur , MS4

## 2020-10-09 NOTE — PROVIDER NOTIFICATION
DATE:  10/9/2020   TIME OF RECEIPT FROM LAB:  0650   LAB TEST:  Hgb  LAB VALUE:  6.1  RESULTS GIVEN WITH READ-BACK TO (PROVIDER):  Tele ICU  TIME LAB VALUE REPORTED TO PROVIDER:   *0651    Pt having active bleeding from wounds on buttocks.    Requested Type and Cross, pt to transfuse during AM.

## 2020-10-09 NOTE — PROGRESS NOTES
Serosanguinous Bleeding suspected pressure sores on bottom and on upper L leg under gluteal fold. BG 94. Neuros intact. Weeping legs. Blister on R. Lower leg. Moist skin in popliteal fold. Maceration weeping in legs. Maroon purple on upper and lower legs. Bruising on lower back, arm. Pt reports some dizziness. AxO x4. Total care. Kramer cathete placed before admission. Red localized rash under abdominal skin folds. Denies numbness/tingling. Visual tracking normal. No pain meds given due to low BPs.

## 2020-10-09 NOTE — PROGRESS NOTES
[facility]    Hospitalist Progress Note  Name: Waleska Marie    MRN: [id number]  Provider: Jillian Anderson MD  Date of Service: 10/09/2020    Assessment & Plan   Summary of Stay: Waleska Marie is a 75 year old female who was admitted on 10/8/2020 for acute on chronic kidney disease stage III non-anion gap metabolic acidosis and severe hyperkalemia.  He initially presented with leg swelling.    His past medical history significant for type 2 diabetes mellitus, atrial fibrillation, stage III chronic kidney disease, severe lymphedema, morbid obesity, COPD, asthma, mantle cell lymphoma.    He received emergent dialysis today for intractable hyperkalemia nonresponsive to several rounds of insulin D50.     Hyperkalemia, EVER on at least CKD stage III, NAGMA:   -Presented to outside facility with creatinine 2.75, potassium 7.9, bicarb 12 without elevated anion gap.  Last creatinine on file from 3 years ago was 1.3.   - EKG showed slightly peaked T waves along with LBBB.   -PTA She is on lisinopril, Lasix, metformin, and potassium 20 mEq twice daily at baseline which are all likely to result in  EVER, hyperkalemia, and NAGMA.    -Kramer catheter placed and urine appears dark carlos.  - She was shifted twice with IV insulin/D50 at outside facility and also received 40 mg IV Lasix, 2 L IV normal saline, multiple albuterol nebs, and calcium gluconate 2 g x 2.   - Repeat potassium was 7.4 and she was transferred here.    -On admission at [facility] potassium was 7.5, creatinine up to 3.4, bicarbonate 13.  -Nephrology consulted, evaluated patient at the bedside this evening, greatly appreciate recommendations.    -Improving her acidosis should improve hyperkalemia.    -Received insulin D50 and sodium bicarbonate push now then start 150meq sodium bicarbonate drip at 150 ml/hr  -Telemetry  -Currently on both aspirin and Eliquis.  This along with her morbid obesity and lymphedema would make dialysis catheter placement  quite challenging and likely would need to be done by IR  -Kramer catheter for strict intake and output  -Emergent dialysis      Hypotension:   -Blood pressure softer in the 80-90 systolic range although it is a forearm cuff measurement.   -she was likely intravascularly dry.  She received 2 L normal saline at outside facility.  Doubt this is from sepsis as she may have a UTI although not certain about this and lactic acid not elevated.  -Received 25 g 25% IV albumin now and then starting bicarbonate infusion at 150 ml/hr     Possible UTI:   -Urinalysis grossly abnormal with > 100 WBCs large blood, moderate LE, and positive nitrite.  Slight leukocytosis 11.3.  Afebrile.  UTI in the differential, however no symptoms for this.  Received -ceftriaxone at outside facility.  -Continue ceftriaxone 1 g every 24 hours  -Follow urine culture results     Lymphedema: Severe lymphedema is a chronic issue for the patient.  Resulting in blistering of the skin and weeping.  Has chronic slight erythema to both legs from the feet all the way up to the hip.  Difficult to say if any infection here, she does not it appears different than her baseline.  Takes 40 mg Lasix daily for this.    -TTE from 2017 does not showed , in the past had some reduction in EF of 55-60%, slight elevated right-sided pressures, and diastolic function was indeterminate.  Per the TTE report, she previously had lower EF.  -Holding diuretics as she appears intravascularly dry as above  -Wound nurse consult  -Having severe pain so ordered oral Dilaudid 2-4 mg every 4 hours as needed  -OT consult for lymphedema    Acute on chronic normocytic anemia:   Hemoglobin previously 8-9 range in 2017.    -Denies any bloody stools or other blood loss.  This is in part due to her CKD.  Also, likely has chronic inflammation from her lymphedema with chronic wounds.  -drop in hemoglobin from 7.7-5.8  -We will transfuse 1 unit of PRBCs  -No evidence of acute GI bleed will start on  PPI    Morbid obesity: weight is 304 lb, a fair amount of this is fluid.     A. fib, LBBB, HTN: Chronically on Eliquis for anticoagulation.  Also takes metoprolol tartrate 25 mg twice daily and digoxin 125 mcg daily.  Mildly tachycardic here with heart rate 100.  EKG shows LBBB, unclear if new or not.  -Checked digoxin level which is not elevated 1.2 despite the renal dysfunction  -Telemetry  -Hold Eliquis and aspirin in case of need for dialysis catheter placement  -Hold metoprolol due to soft blood pressure     IDDM type II:   -PTA on 35 units Lantus at bedtime along with metformin 500 mg twice daily.  She did not take Lantus the night prior to presentation.  Blood glucose on admission here is in the 90s, she did receive 10 units IV insulin x2 along with D50 at outside facility.  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    - Resumed PTA dose.     COPD, asthma: Remote history of smoking.  She reports asthma.  PTA on Flovent and albuterol inhaler.  She has chronic shortness of breath, but this is worse the past 5 days.  Chest x-ray showed cardiomegaly, but no infiltrate.  -Resume PTA Flovent, duonebs as needed     Urinary incontinence: Resumed oxybutynin 5 mg twice daily.     HLD: CK level normal.  Resumed simvastatin 10 mg daily.    Decubiti ulcer  -Wound care consult    Disposition  -Consulted social work.  Patient lives with her son not sure if she has adequate resources  -Consulted palliative care as patient is declining any further dialysis        DVT Prophylaxis: Right admission patient on Eliquis will likely resume once patient is stable for now patient has drop in hemoglobin we will monitor carefully.  Will resume Eliquis if hemoglobin remains stable  Code Status: Full Code    Disposition: Expected discharge to be  decided    Interval History   Assumed care reviewed chart.  Patient seen after dialysis was complaining of severe pain all over her body.  Has multiple decubiti.  She said she does not want any further dialysis.  Complains of generalized muscle and body ache.      -Data reviewed today: I reviewed all new labs and imaging reports over the last 24 hours. I personally reviewed no images or EKG's today.    Physical Exam   Temp: 97.6  F (36.4  C) Temp src: Oral BP: 97/43 Pulse: 112   Resp: 21 SpO2: 98 % O2 Device: None (Room air) Oxygen Delivery: 2 LPM  Vitals:    10/08/20 2300   Weight: 138.2 kg (304 lb 10.8 oz)     Vital Signs with Ranges  Temp:  [97.6  F (36.4  C)-98.8  F (37.1  C)] 97.6  F (36.4  C)  Pulse:  [] 112  Resp:  [14-44] 21  BP: ()/() 97/43  SpO2:  [90 %-100 %] 98 %  I/O last 3 completed shifts:  In: 1596.7 [P.O.:480; I.V.:1116.7]  Out: 20 [Urine:20]      Constitutional: Awake, does not appear in distress, chronically ill-appearing  Eyes: sclera white  HEENT: atraumatic, dry mucous membranes  Respiratory: Anterior lung fields clear, no crackles or wheeze  Cardiovascular:  Irregularly irregular tachycardia without murmur  GI: Obese, non-tender, not distended, bowel sounds present  Lymph/Hematologic: no cervical, axillary, inguinal adenopathy  Genitourinary: Kramer catheter with dark carlos urine in the bag  Skin: Diffuse slight pink erythema to entire legs bilaterally.  Multiple bulla.  Few open bulla without any sign of pus drainage, just clear fluid drainage.  Decubiti noted in back and buttock musculoskeletal/extremities: 3+ lymphedema bilaterally to the hips.  Neurologic: A&Ox3, speech clear, moving all extremities equally  Psychiatric: calm, cooperative     Medications     IV infusion builder WITH additives 150 mL/hr at 10/09/20 0601     dextrose       insulin (regular) 0.5 Units/hr (10/09/20 0652)       cefTRIAXone  1 g Intravenous Q24H     cetirizine  10 mg Oral Daily     fluticasone   1 puff Inhalation Daily     ibrutinib  560 mg Oral At Bedtime     midazolam  1 mg Intravenous Once     oxybutynin  5 mg Oral BID     simvastatin  10 mg Oral At Bedtime     sodium chloride (PF)  3 mL Intracatheter Q8H     Data     Recent Labs   Lab 10/09/20  0332 10/09/20  0051   PHV 7.23* 7.20*   PO2V 23* 20*   PCO2V 41 40   HCO3V 17* 16*     Recent Labs   Lab 10/09/20  0548 10/08/20  2043   WBC 8.1 11.3*   HGB 6.1* 7.2*   HCT 21.3* 25.9*   MCV 96 97    431     Recent Labs   Lab 10/09/20  0548 10/09/20  0332 10/09/20  0258 10/09/20  0051    138  --  136   POTASSIUM 6.4* 6.4* 6.8* 7.1*   CHLORIDE 113* 114*  --  113*   CO2 17* 17*  --  18*   ANIONGAP 7 7  --  5   * 145*  --  129*   BUN 83* 83*  --  85*   CR 3.11* 3.25*  --  3.27*   GFRESTIMATED 14* 13*  --  13*   GFRESTBLACK 16* 15*  --  15*   LEE 8.2* 8.2*  --  8.3*     No results for input(s): CULT in the last 168 hours.  No results for input(s): NTBNPI, NTBNP in the last 168 hours.  Recent Labs   Lab 10/09/20  0548 10/09/20  0332 10/09/20  0051   CR 3.11* 3.25* 3.27*     No results for input(s): SED, CRP in the last 168 hours.  GFR Estimate   Date Value Ref Range Status   10/09/2020 14 (L) >60 mL/min/[1.73_m2] Final     Comment:     Non  GFR Calc  Starting 12/18/2018, serum creatinine based estimated GFR (eGFR) will be   calculated using the Chronic Kidney Disease Epidemiology Collaboration   (CKD-EPI) equation.     10/09/2020 13 (L) >60 mL/min/[1.73_m2] Final     Comment:     Non  GFR Calc  Starting 12/18/2018, serum creatinine based estimated GFR (eGFR) will be   calculated using the Chronic Kidney Disease Epidemiology Collaboration   (CKD-EPI) equation.     10/09/2020 13 (L) >60 mL/min/[1.73_m2] Final     Comment:     Non  GFR Calc  Starting 12/18/2018, serum creatinine based estimated GFR (eGFR) will be   calculated using the Chronic Kidney Disease Epidemiology Collaboration   (CKD-EPI)  equation.       GFR Estimate If Black   Date Value Ref Range Status   10/09/2020 16 (L) >60 mL/min/[1.73_m2] Final     Comment:      GFR Calc  Starting 12/18/2018, serum creatinine based estimated GFR (eGFR) will be   calculated using the Chronic Kidney Disease Epidemiology Collaboration   (CKD-EPI) equation.     10/09/2020 15 (L) >60 mL/min/[1.73_m2] Final     Comment:      GFR Calc  Starting 12/18/2018, serum creatinine based estimated GFR (eGFR) will be   calculated using the Chronic Kidney Disease Epidemiology Collaboration   (CKD-EPI) equation.     10/09/2020 15 (L) >60 mL/min/[1.73_m2] Final     Comment:      GFR Calc  Starting 12/18/2018, serum creatinine based estimated GFR (eGFR) will be   calculated using the Chronic Kidney Disease Epidemiology Collaboration   (CKD-EPI) equation.       Recent Labs   Lab 10/09/20  0815 10/09/20  0650 10/09/20  0558 10/09/20  0548 10/09/20  0456 10/09/20  0354 10/09/20  0332 10/09/20  0051 10/09/20  0051 10/08/20  2043 10/08/20  2043   GLC  --   --   --  111*  --   --  145*  --  129*  --  90   * 133* 103*  --  101* 127*  --    < >  --    < >  --     < > = values in this interval not displayed.     Recent Labs   Lab 10/09/20  0548 10/08/20  2043   HGB 6.1* 7.2*     Recent Labs   Lab 10/08/20  2043   AST 12   ALT 13   ALKPHOS 97   BILITOTAL 0.3     No results for input(s): INR in the last 168 hours.  Recent Labs   Lab 10/08/20  2043   LACT 0.9     No results for input(s): LIPASE in the last 168 hours.  Recent Labs   Lab 10/09/20  0548 10/09/20  0332 10/09/20  0051   BUN 83* 83* 85*   CR 3.11* 3.25* 3.27*     No results for input(s): TSH in the last 168 hours.  Recent Labs   Lab 10/08/20  2043   TROPI <0.015     No results for input(s): COLOR, APPEARANCE, URINEGLC, URINEBILI, URINEKETONE, SG, UBLD, URINEPH, PROTEIN, UROBILINOGEN, NITRITE, LEUKEST, RBCU, WBCU in the last 168 hours.    Recent Results (from the past 24  hour(s))   XR Chest Port 1 View    Narrative    EXAM: XR CHEST PORT 1 VW  LOCATION: [facility]  DATE/TIME: 10/9/2020 4:20 AM    INDICATION: Line placement.  COMPARISON: None.    FINDINGS: Supine portable chest. Right IJ central venous catheter in place with tip projecting over the distal SVC. There is no pneumothorax. The heart is at the upper limits normal in size. No pulmonary edema. Thoracic aorta is calcified and mildly   tortuous. Mild left basilar atelectasis or scar. The lungs are otherwise clear.      Impression    IMPRESSION: No pneumothorax post central line placement.

## 2020-10-09 NOTE — H&P
[facility]  Hospitalist Admission Note  Name: Waleska Marie    MRN: [id number]  YOB: 1945    Age: 75 year old  Date of admission: 10/8/2020  Primary care provider: Nahun Espinoza    Chief Complaint:  Leg swelling    Assessment and Plan:   Hyperkalemia, EVER on at least CKD stage III, NAGMA: Presented to outside facility with creatinine 2.75, potassium 7.9, bicarb 12 without elevated anion gap.  Last creatinine on file from 3 years ago was 1.3.  EKG showed slightly peaked T waves along with LBBB.  She is on lisinopril, Lasix, metformin, and potassium 20 mEq twice daily at baseline which are all likely to her EVER, hyperkalemia, and NAGMA.  Kramer catheter placed and urine appears dark carlos.  Urinalysis was grossly abnormal with large blood, moderate LE, positive nitrate, 100 protein, and > 100 WBC and RBCs.  Possible UTI as below.  She was shifted twice with IV insulin/D50 at outside facility and also received 40 mg IV Lasix, 2 L IV normal saline, multiple albuterol nebs, and calcium gluconate 2 g x 2.  I do not see Kayexalate on the medication that were administered.  Repeat potassium was 7.4 and she was transferred here.  On admission potassium was 7.5, creatinine up to 3.4, bicarbonate 13.  Seems to be tolerating the higher potassium surprisingly well, this along with phosphorus level of 6.5 could point towards more chronicity to her worsening renal dysfunction.  -Nephrology consulted, evaluated patient at the bedside this evening, greatly appreciate recommendations.    -Improving her acidosis should improve hyperkalemia.  Give 1 amp sodium bicarbonate push now then start 150meq sodium bicarbonate drip at 150 ml/hr  -Recheck potassium at 12:30 AM, BMP in the morning  -Telemetry  -Currently on both aspirin and Eliquis.  This along with her morbid obesity and lymphedema would make dialysis catheter placement quite challenging and likely would need to be done by IR  -Kramer catheter for  strict intake and output    Hypotension: Blood pressure softer in the 80-90 systolic range although it is a forearm cuff measurement.  She is alert and oriented.  Her lactic acid was checked and is not elevated which is reassuring.  Nephrology and I agree that despite her lymphedema she is likely intravascularly dry.  She received 2 L normal saline at outside facility.  Doubt this is from sepsis as she may have a UTI although not certain about this and lactic acid not elevated.  -Giving 25 g 25% IV albumin now and then starting bicarbonate infusion at 150 ml/hr    Possible UTI: Urinalysis grossly abnormal with > 100 WBCs large blood, moderate LE, and positive nitrite.  Slight leukocytosis 11.3.  Afebrile.  UTI in the differential, however no symptoms for this.  Received ceftriaxone at outside facility.  -Continue ceftriaxone 1 g every 24 hours  -Follow urine culture results    Lymphedema: Severe lymphedema is a chronic issue for the patient.  Resulting in blistering of the skin and weeping.  Has chronic slight erythema to both legs from the feet all the way up to the hip.  Difficult to say if any infection here, she does not it appears different than her baseline.  Takes 40 mg Lasix daily for this.  TTE from 2017 does not showed , in the past had some reduction in EF of 55-60%, slight elevated right-sided pressures, and diastolic function was indeterminate.  Per the TTE report, she previously had lower EF.  -Holding diuretics as she appears intravascularly dry as above  -Wound nurse consult  -Having severe pain so ordered oral Dilaudid 2-4 mg every 4 hours as needed    Morbid obesity: weight is 304 lb, a fair amount of this is fluid.    A. fib, LBBB, HTN: Chronically on Eliquis for anticoagulation.  Also takes metoprolol tartrate 25 mg twice daily and digoxin 125 mcg daily.  Mildly tachycardic here with heart rate 100.  EKG shows LBBB, unclear if new or not.  -Checked digoxin level which is not elevated 1.2 despite  the renal dysfunction  -Telemetry  -Hold Eliquis and aspirin in case of need for dialysis catheter placement  -Hold metoprolol due to soft blood pressure    IDDM type II: PTA on 35 units Lantus at bedtime along with metformin 500 mg twice daily.  She did not take Lantus the night prior to presentation.  Blood glucose on admission here is in the 90s, she did receive 10 units IV insulin x2 along with D50 at outside facility.  -IV fluids contain dextrose as the base  -start insulin drip, which will also help with her hyperkalemia    Normocytic anemia: Hemoglobin 7.7 initially then 7.2 here after 2 L IV fluids.  Hemoglobin previously 8-9 range in 2017.  Denies any bloody stools or other blood loss.  This is in part due to her CKD.  Also, likely has chronic inflammation from her lymphedema with chronic wounds.  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  Checked up-to-date and there is no renal dosing change for this dose as it is minimally excreted in the urine.  Resumed PTA dose.    COPD, asthma: Remote history of smoking.  She reports asthma.  PTA on Flovent and albuterol inhaler.  She has chronic shortness of breath, but this is worse the past 5 days.  Chest x-ray showed cardiomegaly, but no infiltrate.  -Resume PTA Flovent, duonebs as needed    Urinary incontinence: Resume oxybutynin 5 mg twice daily.    HLD: CK level normal.  Resume simvastatin 10 mg daily.      Asymptomatic COVID19 testing was performed at the outside facility, however may need urgent procedure tomorrow with hemodialysis catheter placement so placed a new order for asymptomatic COVID19 with urgent procedure.DVT Prophylaxis: Pneumatic Compression Devices -  holding PTA Eliquis in case dialysis catheter is needed   Lines: PIV, getting second PIV, Kramer catheter for strict intake and output  Code Status: Full Code  FEN: Dialysis diet, albumin dose as above,  D5-150 mEq sodium bicarbonate at 150 ml/hr  Discharge Dispo: TBD.  Lives at home.  Primarily moves from a chair to the bathroom at sit.  Sleeps in a recliner.  Has a PCA believe that helps her with different things including bathing.  Consult PT/OT/SW for tomorrow or next day if potassium not better  Estimated Disch Date / # of Days until Disch: Admit inpatient.  Patient transferred to the ICU shortly after admission when labs showed ongoing severe hyperkalemia.  Anticipate minimal 3-4 night hospitalization.    Discussed plan of care with the tele ICU physician.      History of Present Illness:  Waleska Marie is a 75 year old female with PMH including IDDM type II, morbid obesity, severe lymphedema, CKD stage III, COPD, asthma, HLD, mantle cell lymphoma, anemia, and A. fib on Eliquis who presented to [address] in [facility] due to multiple weeping sores on her lower legs.  She is having 8-10 bilateral leg pain that is worse than her baseline.  She has had progressive blistering and opening of these blisters with weeping for some time.  She does have a caregiver that comes in to help provide some wound care I believe and helps her with bathing although she has not bathed in 1 week.  She primarily is in her recliner and then goes to the bathroom back.  She denies any fevers, chills, chest pain, cough, dysuria, abdominal pain, nausea, vomiting, or flank pain.  She has had a little bit of diarrhea.  She has not noticed any decrease in urine output with her Lasix that she takes.  She did not take her Lantus last night.    Initial lab work there showed a potassium of 7.9, bicarbonate 12, and creatinine of 2.75.  EKG showed slightly peaked T waves and LBBB.  Chest x-ray showed no infiltrate.  WBC was 10.6, hemoglobin 7.7, platelet count 529.  BNP 39.  Urinalysis showed 100 protein, large blood, moderate LE, positive nitrate, and numerous WBCs and RBCs.  She received IV ceftriaxone for possible UTI.  She was  shifted with insulin and D50 twice.  Also received multiple albuterol nebs, 40 mg IV Lasix, 2 L of normal saline, acetaminophen, 2.5 mg oxycodone, and 2 g calcium gluconate x2.  Dr. Mckay spoke with Dr. Buckley from nephrology here.  He recommended the second insulin shift and to give Kayexalate.  I do not see Kayexalate on the medications given with the documentation she came with.  She was admitted to the cardiac telemetry floor.  Her blood pressure was 90 systolic with a forearm cuff although she was mentating appropriately.  Stat labs were obtained and potassium was still elevated at 7.5 so she was transferred over to the ICU.    History obtained from patient, medical record, and from Serena Espinoza hospitalist PA who took a signout from Dr. Mckay at [address].     Past Medical History reviewed:  IDDM type II  A. fib  CKD stage III  Severe lymphedema  Morbid obesity  COPD  Asthma  Mantle cell lymphoma  Osteoarthritis  GERD  Anemia  Urinary stress incontinence    Past Surgical History reviewed:  Appendectomy  Laparoscopic cholecystectomy  Total abdominal hysterectomy  Salpingo-oophorectomy  Cataract removal    Social History reviewed:  Former smoker quit in 1972  No alcohol use    Family History reviewed:  Her son has nephrotic syndrome otherwise known has any kidney disease that she is aware of  Strong family history of asthma in siblings    Allergies:  Allergies   Allergen Reactions     Azithromycin Hives     Ciprofloxacin      wheezing     Morphine Hives     Nitrofurantoin      Constipation, wheezing     Medications:  Prior to Admission medications    Medication Sig Last Dose Taking? Auth Provider   albuterol (PROAIR HFA/PROVENTIL HFA/VENTOLIN HFA) 108 (90 BASE) MCG/ACT Inhaler Inhale 2 puffs into the lungs every 4 hours as needed for shortness of breath / dyspnea or wheezing 10/8/2020 at Unknown time Yes Reported, Patient   apixaban ANTICOAGULANT (ELIQUIS) 5 MG tablet Take 5 mg by mouth 2 times daily  10/8/2020 at am Yes Reported, Patient   aspirin 81 MG EC tablet Take 81 mg by mouth daily 10/8/2020 at Unknown time Yes Reported, Patient   bisacodyl (DULCOLAX) 5 MG EC tablet Take 5 mg by mouth daily  10/8/2020 at Unknown time Yes Reported, Patient   cetirizine HCl (ZYRTEC ALLERGY) 10 MG CAPS Take 10 mg by mouth daily  10/8/2020 at Unknown time Yes Reported, Patient   digoxin (LANOXIN) 125 MCG tablet Take 125 mcg by mouth daily 10/8/2020 at Unknown time Yes Reported, Patient   fluticasone (FLOVENT HFA) 220 MCG/ACT Inhaler Inhale 1 puff into the lungs 2 times daily Past Week at Unknown time Yes Reported, Patient   furosemide (LASIX) 40 MG tablet Take 80 mg by mouth daily  10/8/2020 at Unknown time Yes Reported, Patient   GLUCOSAMINE-CHONDROITIN PO Take 2 capsules by mouth 2 times daily 10/8/2020 at am Yes Reported, Patient   ibrutinib (IMBRUVICA) 140 MG capsule Take 140 mg by mouth daily  10/7/2020 at hs Yes Reported, Patient   insulin glargine (LANTUS SOLOSTAR) 100 UNIT/ML injection Inject 35 Units Subcutaneous At Bedtime  10/7/2020 at Unknown time Yes Reported, Patient   lisinopril (PRINIVIL/ZESTRIL) 10 MG tablet Take 10 mg by mouth daily 10/8/2020 at Unknown time Yes Reported, Patient   metFORMIN (GLUCOPHAGE) 500 MG tablet Take 500 mg by mouth 2 times daily (with meals) 10/8/2020 at am Yes Reported, Patient   METHYL SALICYLATE EX Externally apply topically 2 times daily   Yes Reported, Patient   metoprolol (LOPRESSOR) 50 MG tablet Take 25 mg by mouth 2 times daily 10/8/2020 at am Yes Reported, Patient   Multiple Vitamins-Minerals (MULTIVITAMIN ADULT PO) Take 1 tablet by mouth daily  10/8/2020 at Unknown time Yes Reported, Patient   oxybutynin (DITROPAN) 5 MG tablet Take 5 mg by mouth 2 times daily Past Week at Unknown time Yes Reported, Patient   pioglitazone (ACTOS) 30 MG tablet Take 30 mg by mouth daily 10/8/2020 at Unknown time Yes Reported, Patient   potassium chloride (KLOR-CON) 20 MEQ Packet Take 20 mEq by  mouth 2 times daily 10/8/2020 at am Yes Reported, Patient   simvastatin (ZOCOR) 10 MG tablet Take 10 mg by mouth At Bedtime 10/7/2020 at Unknown time Yes Reported, Patient   SOY ISOFLAVONE PO Take 198 mg by mouth daily 10/8/2020 at Unknown time Yes Reported, Patient   TiZANidine HCl (ZANAFLEX) 2 MG CAPS Take 2 mg by mouth 4 times daily as needed  10/8/2020 at am Yes Reported, Patient     Review of Systems:  A Comprehensive greater than 10 system review of systems was carried out.  Pertinent positives and negatives are noted above.  Otherwise negative.     Physical Exam:  Blood pressure 93/41, pulse 101, temperature 98.4  F (36.9  C), temperature source Oral, resp. rate 22, SpO2 99 %.  Wt Readings from Last 1 Encounters:   No data found for Wt     Exam:  Constitutional: Awake, does not appear in distress, chronically ill-appearing  Eyes: sclera white  HEENT: atraumatic, dry mucous membranes  Respiratory: Anterior lung fields clear, no crackles or wheeze  Cardiovascular: Regular tachycardia without murmur  GI: Obese, non-tender, not distended, bowel sounds present  Lymph/Hematologic: no cervical, axillary, inguinal adenopathy  Genitourinary: Kramer catheter with dark carlos urine in the bag  Skin: Diffuse slight pink erythema to entire legs bilaterally.  Multiple bulla.  Few open bulla without any sign of pus drainage, just clear fluid drainage  Musculoskeletal/extremities: 3+ lymphedema bilaterally to the hips.  Neurologic: A&Ox3, speech clear, moving all extremities equally  Psychiatric: calm, cooperative     Lab and imaging data personally reviewed:  Labs:  Recent Labs   Lab 10/08/20  2043   WBC 11.3*   HGB 7.2*   HCT 25.9*   MCV 97        Recent Labs   Lab 10/08/20  2043      POTASSIUM 7.5*   CHLORIDE 112*   CO2 13*   ANIONGAP 8   GLC 90   BUN 80*   CR 3.14*   GFRESTIMATED 14*   GFRESTBLACK 16*   LEE 8.4*   MAG 2.2   PHOS 5.7*   PROTTOTAL 6.5*   ALBUMIN 2.5*   BILITOTAL 0.3   ALKPHOS 97   AST 12   ALT  13     Recent Labs   Lab 10/08/20  2043   CKT 76     Recent Labs   Lab 10/08/20  2043   TROPI <0.015     Digoxin level 1.2    EKG: LBBB, tachycardic, slightly peaked T waves    Imaging:  Outside facility chest x-ray read as cardiomegaly with no infiltrate or significant pulmonary edema/vascular congestion    Nahum Hinson MD  Hospitalist  [facility]

## 2020-10-09 NOTE — CONSULTS
Consult Date:  10/08/2020      IDENTIFICATION:  A 75-year-old female received in transfer from [address] in [place], [place] for ongoing management of kidney injury, hyperkalemia and metabolic acidosis.      REASON FOR CONSULTATION:  Evaluation and assessment with respect to above.      IMPRESSION:   1.  Apparent baseline chronic kidney disease.  Limited historical data for review, but most recent creatinine is in the range of approximately 1.0 dated 2017.  Estimated GFR in the range of approximately 60 mL per minute, representing at least stage III chronic kidney disease.  Unclear if underlying disease progression exists.  The exact renal baseline unknown.   2.  Previously documented microalbuminuria with an albumin to creatinine ratio from 11/2017 at 70 mg/gram.   3.  Hypertension and type 2 diabetes as contributing factors to above.   4.  Acute kidney injury with presenting creatinine of 2.75.  This is in the setting of recent several day illness, perhaps poor p.o. intake and ongoing use of lisinopril, furosemide.  I suspect she is either prerenal or has evolved to acute tubular injury.   5.  Significant metabolic acidosis with presenting bicarbonate in the range of 12.  Current bicarbonate 13 millimole per liter.  Anion gap 8 representing non-anion gap metabolic acidosis.  Serum lactate within normal limits.  She does take metformin.  She has had recent episodic diarrhea, but none for approximately a week.  No reason to suggest ingestion in this setting.  She is on metformin as a contributing factor.   6.  Significant hyperkalemia with presenting potassium 7.9.  This is multifactorial in the setting of acute kidney injury, metabolic acidosis and ongoing treatment with lisinopril, beta blocker as well as potassium supplementation.   7.  Type 2 diabetes with apparent diabetic nephropathy.   8.  History of hypotension.   9.  Relative hypotension at this time with blood pressures in the range of 90  systolic and a heart rate of 114.   10.  Anticoagulated with Eliquis.   11.  Outpatient management of rhythm with digoxin.   12.  Lymphedema with chronic stasis changes.   13.  Morbid obesity.   14.  Modest hyponatremia.      DISCUSSION:  Elderly female with a number of comorbid medical problems.  She has underlying CKD, presumed stage III, but baseline renal function not available for review.  She presents with edema, but was found to have acute kidney injury, hyperkalemia and metabolic acidosis.  This presumably was initially prerenal, may have evolved to acute tubular necrosis.  In this setting, she has developed significant electrolyte abnormalities.  She appears to be borderline oliguric, but is encouragingly making urine at this time.      I do not believe she requires emergent dialysis at this time.  Management should be centered around volume expansion, correction of acidosis and monitoring of potassium and renal function.        PLAN:   1.  Document digoxin level   2.  Consider Digibind if elevated.   3.  IV fluid to contain bicarbonate.  We will order D5 with 150 mEq at 150 mL an hour.   4.  25 grams IV albumin for volume expansion.   5.  Single dose of Kayexalate.   6.  Document urine sodium.   7.  Hold all offending medications.   8.  May require renal replacement therapy pending her clinical course.  We will make her n.p.o. in the event that her potassium and acidosis are not correctable in the short-term.      HISTORY:  A 75-year-old female with a number of comorbid medical problems.  She is morbidly obese and confined to a chair.  She is able to ambulate to the bathroom.  She also has hypertension, type 2 diabetes, mantle cell lymphoma, which is undergoing treatment as well as a history of congestive heart failure.      She presented to an outside facility today complaining of bilateral lower extremity edema.  Further history suggests 3-4 days of not feeling well.  Perhaps diarrhea in the week past.   She denies headache or chest pain.  She has not been overtly short of breath.  She does have a history of asthma.      She has taken all of her medications as prescribed, including this morning.      She woke this morning and with the help of her caregiver, summoned EMT for transport to the emergency room.  Ultimately, she was transferred to this facility for ongoing management.      Her evaluation at [place] [address] revealed hyperkalemia, acute renal failure and metabolic acidosis.  She has been treated twice with a cocktail that includes calcium, D50, insulin and albuterol nebs.  In addition, she has received a single dose of furosemide and 2 liters of saline infusion.      Her EKG shows modest PT as well as a left bundle branch block.  Her current rhythm is stable.  Her QRS is affected by her left bundle and her rate is 114.      She is awake, alert, appropriate, interactive, answers questions.  She has a Kramer catheter in place and she appears to be making a modest amount of urine at this time.      PAST MEDICAL HISTORY:   1.  Chronic obstructive pulmonary disease, CKD stage III with at this time, unclear baseline renal function.   2.  GERD.   3.  History of female stress incontinence.   4.  Hypertension.   5.  Chronic lymphedema with significant venous stasis.   6.  Mantle cell lymphoma.   7.  History of anemia.   8.  Hyperlipidemia.   9.  Obesity.   10.  History of heart failure.   11.  Anticoagulated.  Currently on Eliquis.  INR 1.5.      ALLERGIES:  NUMEROUS AND REVIEWED AND INCLUDE AZITHROMYCIN, CIPROFLOXACIN, MORPHINE, NITROFURANTOIN.      ADMISSION MEDICATIONS:   1.  Multivitamin.   2.  Baby aspirin.   3.  Insulin.   4.  Glucosamine chondroitin.   5.  Lisinopril 10 mg.   6.  Eliquis 5 mg.   7.  Potassium chloride extended release 20 mg tablet p.o. b.i.d.   8.  Simvastatin.   9.  Metoprolol 50 mg twice daily.   10.  Metformin 500 mg b.i.d.   11.  Zanaflex.   12.  Tylenol.   13.  Furosemide 80 mg daily.   14.   Advair.   15.  Omeprazole.   16.  Ibrutinib.   17.  Ditropan.    18.  The patient acknowledges not taking any ibuprofen in the last several days.      FAMILY AND SOCIAL HISTORY:  Reviewed in the record.        REVIEW OF SYSTEMS:  Complete 10-point review of systems undertaken.  Pertinent positives and negatives as I noted above.  She is incontinent of urine.   MUSCULOSKELETAL:  She is complaining of significant weeping of her bilateral lower extremities, which may have contributed to decreased intervascular volume.  She has not had a good appetite.  She has had no chest pain per se, and she has not had any difficulty breathing.      PHYSICAL EXAMINATION:   VITAL SIGNS:  Her blood pressure is in the range of 90/60, mean arterial pressure of roughly 60, her rhythm is sinus.  She is tachycardic at 114.   GENERAL:  Elderly, chronically ill-appearing female lying in bed, moaning.  She is not requiring oxygen at this time.   HEENT:  Normocephalic, atraumatic.  Pharynx appears slightly dry.   CHEST:  Good air movement.   CARDIOVASCULAR:  Tachycardic.   ABDOMEN:  Morbidly obese but soft.   EXTREMITIES:  Demonstrate bilateral significant lymphedema to the mid-thigh.  She has chronic stasis and woody edema.   GENITOURINARY:  Kramer catheter in place.   NEUROLOGIC:  Grossly nonfocal.  Cranial nerves appear intact.   PSYCHIATRIC:  Pleasant but uncomfortable.      LABORATORY DATA:  Available historic data was reviewed in detail.  Data presented with the patient from [address] in [place] was reviewed.  Initial laboratory studies performed at this facility also noted as well.         DELFIN ARIAS MD             D: 10/09/2020   T: 10/09/2020   MT:       Name:     ADRIENNE FERRELL   MRN:      [id number]        Account:       [id number]   :      1945           Consult Date:  10/08/2020      Document: [id number]

## 2020-10-09 NOTE — SIGNIFICANT EVENT
Significant Event Note    Time of event: 2:45 AM October 9, 2020    Description of event:  -Patient is 75-year-old female who presented to the [facility] with worsening of her chronic kidney disease, and hyperkalemia and severe metabolic acidosis.    Plan:  --Patient has received insulin plus bicarb and a dose of Lasix.  - Patient has had persistent hyperkalemia despite multiple uses of IV insulin and insulin drip.  - Irregular rhythm is noted on the EKG concerning for cardiac effects of hyperkalemia.  -We will call nephrology to discuss for possible dialysis as this patient may require emergent dialysis at night for her persistent and uncontrolled hyperkalemia despite maximal therapy.  - I have reordered an amp of insulin with D50 and an amp of bicarb to temporize her hyperkalemia.    -Rest as per hospitalist note    Discussed with: Dr. Joya Galloway MD

## 2020-10-09 NOTE — PROVIDER NOTIFICATION
DATE:  10/9/2020   TIME OF RECEIPT FROM LAB:1355LAB TEST: hemoglobin  LAB VALUE:5.8RESULTS GIVEN WITH READ-BACK TO (PROVIDER): odiliaiTIME LAB VALUE REPORTED TO PROVIDER:  1400    Yola Frias RN

## 2020-10-09 NOTE — PLAN OF CARE
ICU End of Shift Summary.  For vital signs and complete assessments, please see documentation flowsheets.     Pertinent assessments: Pt transfer from [place]. Pt A&O. Frustrated/tearful at times. C/O BLE pain from wounds and cramping. Afebrile. Pt in Afib. +2-+3 extremity edema. Lungs diminished on RA. NPO+meds. BM smear. +BS. Kramer with no UO overnight (see alternate note). Multiple wounds/excoriation/drainage/weeping from buttocks and BLE and blister with mepilex on RLE, WOC consult placed. Blanchable/non-blanchable redness noted and BLE cedric/maroon. Cloths placed between folds, pt refused micatin powder. Pt arrived with edematous RUE from sodium bicarb infiltrate, ice applied, monitoring. Pt on sodium bicarb gtt.    Major Shift Events:   -UA with UTI  -K frequently monitored  -EKG obtained  -Insulin gtt started  -Calcium gluconate, insulin, and sodium bicarb given  -25mcg Fentanyl given for dialysis access, PRN tylenol/dilaudid given.   -Dialysis access obtained and dialysis started  -wrong tube sent for COVID test, requested AM nurse obtain d/t lack of time. Covid test from [facility] still pending.  -pt's own Imbruvica medication sent to pharmacy for bar code and verified with two RN's (from in pt's chart)    Plan (Upcoming Events): Dialysis and monitor K. Blood transfusion. PT/OT and SW consulted. WOC consult and specialty mattress.  Discharge/Transfer Needs: TBD    Bedside Shift Report Completed : Y  Bedside Safety Check Completed:Y

## 2020-10-09 NOTE — PROVIDER NOTIFICATION
DATE:  10/9/2020   TIME OF RECEIPT FROM LAB:  0640  LAB TEST:  K  LAB VALUE:  6.4  RESULTS GIVEN WITH READ-BACK TO (PROVIDER): Tele ICU  TIME LAB VALUE REPORTED TO PROVIDER:  0641    Pt currently getting dialysis, can follow up after.

## 2020-10-09 NOTE — PROGRESS NOTES
Potassium   Date Value Ref Range Status   10/09/2020 6.4 (HH) 3.4 - 5.3 mmol/L Final     Comment:     Reviewed: OK with previous  Critical Value called to and read back by  FADY ROBERTS (ICU RN) AT 0636 10.09.20,TD       Hemoglobin   Date Value Ref Range Status   10/09/2020 6.1 (LL) 11.7 - 15.7 g/dL Final     Comment:     This result has been called to CAS CELAYA (ICU RN) by Pat Owens on 10 09 2020   at 0646, and has been read back.        Creatinine   Date Value Ref Range Status   10/09/2020 3.11 (H) 0.52 - 1.04 mg/dL Final     Urea Nitrogen   Date Value Ref Range Status   10/09/2020 83 (H) 7 - 30 mg/dL Final     Sodium   Date Value Ref Range Status   10/09/2020 137 133 - 144 mmol/L Final     No results found for: INR    DIALYSIS PROCEDURE NOTE  Writer took over rest of Dialysis run from previous nurse SONI Cote  Skin post assessment: scattered bruising on upper extremities, scattered bruises, wounds and weeping on bilateral lower extremities. Post Edema +3 in upper and lower extremities. Pt dialyzed in ICU room. BFR turned down to 200 d/t hypotension and tachycardia, paged MD, got orders to give albumin if SBP drops below 80, did not end up using. Pt moaned and complained of pain in legs from cramping during 1 1/2 hours of remaining treatment. Updated MD and got pain medication from floor nurse to administer.  Report received from: SONI Cote (Dialysis nurse)  Report given to: FATOU Roberts RN  Chlorine/Chloramine water system checked every 4 hours.  Pt new to dialysis upon admission to hospital.

## 2020-10-09 NOTE — PROCEDURES
[facility]    Triple Lumen PICC Placement    Date/Time: 10/9/2020 11:59 AM  Performed by: Hailey Hernandes RN  Authorized by: Jillian Anderson MD   Indications: vascular access    UNIVERSAL PROTOCOL   Site Marked: No  Prior Images Obtained and Reviewed:  No  Required items: Required blood products, implants, devices and special equipment available    Patient identity confirmed:  Verbally with patient and arm band  NA - No sedation, light sedation, or local anesthesia  Confirmation Checklist:  Patient's identity using two indicators, relevant allergies, procedure was appropriate and matched the consent or emergent situation and correct equipment/implants were available  Time out: Immediately prior to the procedure a time out was called (with Yola ATKINS)    [place] Protocol: the Joint Commission Universal Protocol was followed    Preparation: Patient was prepped and draped in usual sterile fashion    ESBL (mL):  2         ANESTHESIA    Local Anesthetic: Lidocaine 1% without epinephrine  Anesthetic Total (mL):  1.5      SEDATION    Patient Sedated: No        Preparation: skin prepped with ChloraPrep  Skin prep agent: skin prep agent completely dried prior to procedure  Sterile barriers: maximum sterile barriers were used: cap, mask, sterile gown, sterile gloves, and large sterile sheet  Hand hygiene: hand hygiene performed prior to central venous catheter insertion  Catheter type: triple lumen  Catheter size: 5 Fr  Brand: Bard  Lot number: [id number]  Placement method: venipuncture, MST and ultrasound  Number of attempts: 1  Successful placement: yes  Orientation: left  Location: basilic vein  Arm circumference: adults 10 cm  Extremity circumference: 43  Visible catheter length: 1  Internal length: 49 cm  Total catheter length: 50  Dressing and securement: blood cleaned with CHG, chlorhexidine patch applied, dressing applied, occlusive dressing applied, statlock and sterile dressing  applied  Post procedure assessment: blood return through all ports, free fluid flow and placement verified by x-ray  PROCEDURE   Patient Tolerance:  Patient tolerated the procedure well with no immediate complications  Describe Procedure: Awaiting chest x-ray. Line is CAJ and good to use.

## 2020-10-09 NOTE — CONSULTS
[facility]    Palliative Care Consultation   Text Page    Date of Admission:  10/8/2020    Assessment & Plan   Waleska Marie is a 75 year old female who was admitted on 10/8/2020. I was asked to see the patient for goals of care in the setting of critical illness.    Recommendations:   Please see assessments below for rationale.  1.Decisional Capacity -  Intact. Patient does not have an advance directive. Per  informed consent policy next of kin should be involved if patient becomes unable. Shubham Nicholson is NOK  2. Pain- bilateral leg pain  Patient's opioid use in past 24 hours: 6 mg PO hydromorphone, Hydromorphone IV 0.4 mg = 32 mg Daily Morphine Equivalent  -Added gabapentin 100 mg daily -renal dosing for neuropathy can increase to 100 mg BID  -Acetaminophen 975 mg 3 times daily for on going pain control  -Hydromorphone PO 2-4 mg every 4 hours as needed for pain  -Hydromorphone IV 0.4-0.6 mg every 2 hours as needed for pain  3. Generalized weakness  -therapies as able  4. Spiritual Care- Oriented to Spiritual Health as part of Palliative Care team. Consultation placed for  to follow.  5. Care Planning- Appreciate Care of [facility] in [place] for discharge planning as able.    Goals of Care: (POLST verbage) code status Full Code  Findings & plan of care discussed with: Bedside Nurse Yola and Hospitalist Dr Anderson  Thank you for involving us in the patient's care.     Corina FELDMAN, CNP  Pain Management and Palliative Care  [facility]  Pgr: [phone]    Time Spent on this Encounter   Total unit/floor time 110 minutes, time consisted of the following, examination of the patient, reviewing the record and completing documentation. >50% of time spent in counseling and coordination of care.  Time spend counseling with patient, family and medical team consisted of the following topics, goals of care, advance care planning, education  about diagnosis, education about prognosis, care planning for discharge and symptom management.  Time spent in coordination of care with team members as listed above.       Palliative Care Assessment:  Wlaeska Marie is a 75 year old female with a past medical history of DM2, morbid obesity, severe lymphedema, CKD, COPD asthma, LHD, mantle cell lymphoma, anemia, atrial fibrilation on eliquis.  who presents with bilateral legs pain and weeping sores. Per family she has a caregiver that helps with cares as patient allows. Per son she is seen at the wound clinic weekly otherwise no wound care daily per his knowledge they were healed. Patient reports that she wants all treatments including CPR and Intubation but wants another option instead of dialysis as it was too painful. She reports 8-10/10 pain in her legs all the time without much relief. Discussed that if her pain was controlled and she needed dialysis would she do it, she will consider it. Son Bharat wants her to have dialysis even if she says no however she is making her own decisions at this time.     Symptoms:   Pain   Dyspnea   Fatigue     Social:         Living situation: lives at home with her son       Support system: two sons and sister     Mental Health:   anxiety    Coping:   Anticipatory grief    Spiritual/Baptism:    Spiritual background: [employer]  Spiritual needs: requested visits    Prognostic Information:   This has been discussed with patient and tatum Nicholson.    Advance Care Planning:        Code Status:  Full Code      History of Present Illness   History is obtained from the patient  Son Bharat  Electronic medical record    Waleska Marie is a 75 year old female with a past medical history of DM2, morbid obesity, severe lymphedema, CKD, COPD asthma, LHD, mantle cell lymphoma, anemia, atrial fibrilation on eliquis.  who presents with bilateral legs pain and weeping sores.       Decision-Making & Goals of Care Discussion:  Discussed on  "October 9, 2020 with Corina FELDMAN CNP: Met with patient in her room. Introduced self and the palliative care team role. Reviewed current hospitalization and current condition. Reviewed code status with patient and she is full code and stated this is her continued wish. She did then say she does not want dialysis, explained that without dialysis and her labs get severe again her heart will likely stop and she will need CPR which will include further pain from chest compression and likely broken ribs. She verbalized understanding and said \"I am not not ready to give up but the pain was miriam severe with dialysis that I do not want to go through that again. Discussed pain medications and getting her pain better controlled prior to and during dialysis and she said she will consider this. For now she is requesting full code and treatments. Discussed with son Bharat who wants her to get dialysis. Did say that she has the right to not have it should she choose. He said if she refuses dialysis her sister should come talk with her about her options in person. Bharat expressed that she is not often wanting to do things that are uncomfortable. Questions asked and answered.      Past Medical History   I have reviewed this patient's medical history and updated it with pertinent information if needed.   No past medical history on file.    Past Surgical History   I have reviewed this patient's surgical history and updated it with pertinent information if needed.  No past surgical history on file.    Prior to Admission Medications   Prior to Admission Medications   Prescriptions Last Dose Informant Patient Reported? Taking?   GLUCOSAMINE-CHONDROITIN PO 10/8/2020 at am  Yes Yes   Sig: Take 2 capsules by mouth 2 times daily   METHYL SALICYLATE EX   Yes Yes   Sig: Externally apply topically 2 times daily    Multiple Vitamins-Minerals (MULTIVITAMIN ADULT PO) 10/8/2020 at Unknown time  Yes Yes   Sig: Take 1 tablet by mouth daily  "   SOY ISOFLAVONE PO 10/8/2020 at Unknown time  Yes Yes   Sig: Take 198 mg by mouth daily   TiZANidine HCl (ZANAFLEX) 2 MG CAPS 10/8/2020 at am  Yes Yes   Sig: Take 2 mg by mouth 4 times daily as needed    albuterol (PROAIR HFA/PROVENTIL HFA/VENTOLIN HFA) 108 (90 BASE) MCG/ACT Inhaler 10/8/2020 at Unknown time  Yes Yes   Sig: Inhale 2 puffs into the lungs every 4 hours as needed for shortness of breath / dyspnea or wheezing   apixaban ANTICOAGULANT (ELIQUIS) 5 MG tablet 10/8/2020 at am  Yes Yes   Sig: Take 5 mg by mouth 2 times daily   aspirin 81 MG EC tablet 10/8/2020 at Unknown time  Yes Yes   Sig: Take 81 mg by mouth daily   bisacodyl (DULCOLAX) 5 MG EC tablet 10/8/2020 at Unknown time  Yes Yes   Sig: Take 5 mg by mouth daily    cetirizine HCl (ZYRTEC ALLERGY) 10 MG CAPS 10/8/2020 at Unknown time  Yes Yes   Sig: Take 10 mg by mouth daily    digoxin (LANOXIN) 125 MCG tablet 10/8/2020 at Unknown time  Yes Yes   Sig: Take 125 mcg by mouth daily   fluticasone (FLOVENT HFA) 220 MCG/ACT Inhaler Past Week at Unknown time  Yes Yes   Sig: Inhale 1 puff into the lungs 2 times daily   furosemide (LASIX) 40 MG tablet 10/8/2020 at Unknown time  Yes Yes   Sig: Take 80 mg by mouth daily    ibrutinib (IMBRUVICA) 140 MG capsule 10/7/2020 at hs  Yes Yes   Sig: Take 560 mg by mouth daily    insulin glargine (LANTUS SOLOSTAR) 100 UNIT/ML injection 10/7/2020 at Unknown time  Yes Yes   Sig: Inject 35 Units Subcutaneous At Bedtime    lisinopril (PRINIVIL/ZESTRIL) 10 MG tablet 10/8/2020 at Unknown time  Yes Yes   Sig: Take 10 mg by mouth daily   metFORMIN (GLUCOPHAGE) 500 MG tablet 10/8/2020 at am  Yes Yes   Sig: Take 500 mg by mouth 2 times daily (with meals)   metoprolol (LOPRESSOR) 50 MG tablet 10/8/2020 at am  Yes Yes   Sig: Take 25 mg by mouth 2 times daily   oxybutynin (DITROPAN) 5 MG tablet Past Week at Unknown time  Yes Yes   Sig: Take 5 mg by mouth 2 times daily   pioglitazone (ACTOS) 30 MG tablet 10/8/2020 at Unknown time  Yes  Yes   Sig: Take 30 mg by mouth daily   potassium chloride (KLOR-CON) 20 MEQ Packet 10/8/2020 at am  Yes Yes   Sig: Take 20 mEq by mouth 2 times daily   simvastatin (ZOCOR) 10 MG tablet 10/7/2020 at Unknown time  Yes Yes   Sig: Take 10 mg by mouth At Bedtime      Facility-Administered Medications: None     Allergies   Allergies   Allergen Reactions     Azithromycin Hives     Ciprofloxacin      wheezing     Morphine Hives     Nitrofurantoin      Constipation, wheezing       Social History   I have updated and reviewed the following Social History Narrative:   Social History     Social History Narrative     Not on file       Family History   I have reviewed this patient's family history and updated it with pertinent information if needed.   No family history on file.    Review of Systems   The 10 point Review of Systems is negative other than noted in the HPI or here.     Palliative Symptom Review (0=no symptom/no concern, 1=mild, 2=moderate, 3=severe):      Pain: 3-severe      Fatigue: 3-severe      Nausea: 0-none      Constipation: 0-none      Diarrhea: 0-none      Depressive Symptoms: 0-none      Anxiety: 2-moderate      Drowsiness: 2-moderate      Poor Appetite: 2-moderate      Shortness of Breath: 1-mild      Insomnia: 0-none      Overall (0 good/no concerns, 3 very poor):  Poor    Physical Exam   Temp:  [97.6  F (36.4  C)-100  F (37.8  C)] 99.4  F (37.4  C)  Pulse:  [] 104  Resp:  [9-44] 9  BP: ()/() 99/51  SpO2:  [90 %-100 %] 96 %  304 lbs 10.81 oz  Exam:  GENERAL APPEARANCE:  Alert, appears ill  ENT:  Mouth and posterior oropharynx normal, moist mucous membranes, [place]  EYES:  EOM, conjunctivae, lids, pupils and irises normal  RESP:  respiratory effort and palpation of chest normal, no respiratory distress, diminished breath sounds bilateral bases  CV:  Palpation and auscultation of heart done , regular rate and rhythm, no murmur, rub, or gallop, peripheral edema 2+ in bilateral extremities with  blisters and weeping  ABDOMEN:  normal bowel sounds, soft, nontender, no hepatosplenomegaly or other masses  SKIN:  wound appearance: weeping open areas on bilateral lower extremities  NEURO:   purposeful movements   PSYCH:  oriented X 3, affect and mood normal    Delirium Screen/CAM:  Delirium = (#1 and #2 = YES) + (#3 and/or #4)   1) Acute onset and fluctuating course:   YES   (acute change in mental status from baseline over last 24 hours)  2) Inattention:   YES   (difficulty focusing, distractible, can't follow conversation)  3) Disorganized thinking:   No   (score only if #1 and #2 are YES)  (rambling/irrelevant conversation, unclear/illogical thoughts, inconsistency)  4) Altered level of consciousness:   No   (score only if #1 and #2 are YES)  (other than alert, calm, cooperative)    Delirium/CAM score: 2/4  Interpretation:  1)  Delirium:  Present  2)  Type:  mixed  3)  Severity:  moderate    Data   Results for orders placed or performed during the hospital encounter of 10/08/20 (from the past 24 hour(s))   Glucose by meter   Result Value Ref Range    Glucose 94 70 - 99 mg/dL   Nephrology IP Consult: Patient to be seen: Routine - within 24 hours; hyperkalemia, K 7.9 initial, EVER; Consultant may enter orders: Yes; Requesting provider? Hospitalist (if different from attending physician)    Narrative    DELFIN Geiger MD     10/8/2020 11:12 PM  Note dictated  Discussed with Dr. Hinson    Comprehensive metabolic panel   Result Value Ref Range    Sodium 133 133 - 144 mmol/L    Potassium 7.5 (HH) 3.4 - 5.3 mmol/L    Chloride 112 (H) 94 - 109 mmol/L    Carbon Dioxide 13 (L) 20 - 32 mmol/L    Anion Gap 8 3 - 14 mmol/L    Glucose 90 70 - 99 mg/dL    Urea Nitrogen 80 (H) 7 - 30 mg/dL    Creatinine 3.14 (H) 0.52 - 1.04 mg/dL    GFR Estimate 14 (L) >60 mL/min/[1.73_m2]    GFR Estimate If Black 16 (L) >60 mL/min/[1.73_m2]    Calcium 8.4 (L) 8.5 - 10.1 mg/dL    Bilirubin Total 0.3 0.2 - 1.3 mg/dL    Albumin 2.5 (L) 3.4  - 5.0 g/dL    Protein Total 6.5 (L) 6.8 - 8.8 g/dL    Alkaline Phosphatase 97 40 - 150 U/L    ALT 13 0 - 50 U/L    AST 12 0 - 45 U/L   Lactic acid whole blood   Result Value Ref Range    Lactic Acid 0.9 0.7 - 2.0 mmol/L   Magnesium   Result Value Ref Range    Magnesium 2.2 1.6 - 2.3 mg/dL   Phosphorus   Result Value Ref Range    Phosphorus 5.7 (H) 2.5 - 4.5 mg/dL   CBC with platelets   Result Value Ref Range    WBC 11.3 (H) 4.0 - 11.0 10e9/L    RBC Count 2.67 (L) 3.8 - 5.2 10e12/L    Hemoglobin 7.2 (L) 11.7 - 15.7 g/dL    Hematocrit 25.9 (L) 35.0 - 47.0 %    MCV 97 78 - 100 fl    MCH 27.0 26.5 - 33.0 pg    MCHC 27.8 (L) 31.5 - 36.5 g/dL    RDW 15.8 (H) 10.0 - 15.0 %    Platelet Count 431 150 - 450 10e9/L   Troponin I   Result Value Ref Range    Troponin I ES <0.015 0.000 - 0.045 ug/L   CK total   Result Value Ref Range    CK Total 76 30 - 225 U/L   Digoxin level   Result Value Ref Range    Digoxin Level 1.2 0.5 - 2.0 ug/L   Hemoglobin A1c   Result Value Ref Range    Hemoglobin A1C Canceled, Test credited 0 - 5.6 %   Sodium random urine   Result Value Ref Range    Sodium Urine mmol/L 44 mmol/L   Glucose by meter   Result Value Ref Range    Glucose 122 (H) 70 - 99 mg/dL   Basic metabolic panel   Result Value Ref Range    Sodium 136 133 - 144 mmol/L    Potassium 7.1 (HH) 3.4 - 5.3 mmol/L    Chloride 113 (H) 94 - 109 mmol/L    Carbon Dioxide 18 (L) 20 - 32 mmol/L    Anion Gap 5 3 - 14 mmol/L    Glucose 129 (H) 70 - 99 mg/dL    Urea Nitrogen 85 (H) 7 - 30 mg/dL    Creatinine 3.27 (H) 0.52 - 1.04 mg/dL    GFR Estimate 13 (L) >60 mL/min/[1.73_m2]    GFR Estimate If Black 15 (L) >60 mL/min/[1.73_m2]    Calcium 8.3 (L) 8.5 - 10.1 mg/dL   Blood gas venous with oxyhemoglobin   Result Value Ref Range    Ph Venous 7.20 (L) 7.32 - 7.43 pH    PCO2 Venous 40 40 - 50 mm Hg    PO2 Venous 20 (L) 25 - 47 mm Hg    Bicarbonate Venous 16 (L) 21 - 28 mmol/L    FIO2 Room air      Oxyhemoglobin Venous 25 %    Base Deficit Venous 11.6 mmol/L    Glucose by meter   Result Value Ref Range    Glucose 123 (H) 70 - 99 mg/dL   Glucose by meter   Result Value Ref Range    Glucose 133 (H) 70 - 99 mg/dL   EKG 12-lead, tracing only   Result Value Ref Range    Interpretation ECG Click View Image link to view waveform and result    Glucose by meter   Result Value Ref Range    Glucose 120 (H) 70 - 99 mg/dL   Potassium   Result Value Ref Range    Potassium 6.8 (HH) 3.4 - 5.3 mmol/L   Glucose by meter   Result Value Ref Range    Glucose 175 (H) 70 - 99 mg/dL   Glucose by meter   Result Value Ref Range    Glucose 139 (H) 70 - 99 mg/dL   Basic metabolic panel   Result Value Ref Range    Sodium 138 133 - 144 mmol/L    Potassium 6.4 (HH) 3.4 - 5.3 mmol/L    Chloride 114 (H) 94 - 109 mmol/L    Carbon Dioxide 17 (L) 20 - 32 mmol/L    Anion Gap 7 3 - 14 mmol/L    Glucose 145 (H) 70 - 99 mg/dL    Urea Nitrogen 83 (H) 7 - 30 mg/dL    Creatinine 3.25 (H) 0.52 - 1.04 mg/dL    GFR Estimate 13 (L) >60 mL/min/[1.73_m2]    GFR Estimate If Black 15 (L) >60 mL/min/[1.73_m2]    Calcium 8.2 (L) 8.5 - 10.1 mg/dL   Blood gas venous with oxyhemoglobin   Result Value Ref Range    Ph Venous 7.23 (L) 7.32 - 7.43 pH    PCO2 Venous 41 40 - 50 mm Hg    PO2 Venous 23 (L) 25 - 47 mm Hg    Bicarbonate Venous 17 (L) 21 - 28 mmol/L    FIO2 Room air      Oxyhemoglobin Venous 32 %    Base Deficit Venous 9.6 mmol/L   Glucose by meter   Result Value Ref Range    Glucose 127 (H) 70 - 99 mg/dL   XR Chest Port 1 View    Narrative    EXAM: XR CHEST PORT 1 VW  LOCATION: [facility]  DATE/TIME: 10/9/2020 4:20 AM    INDICATION: Line placement.  COMPARISON: None.    FINDINGS: Supine portable chest. Right IJ central venous catheter in place with tip projecting over the distal SVC. There is no pneumothorax. The heart is at the upper limits normal in size. No pulmonary edema. Thoracic aorta is calcified and mildly   tortuous. Mild left basilar atelectasis or scar. The lungs are otherwise clear.       Impression    IMPRESSION: No pneumothorax post central line placement.                Glucose by meter   Result Value Ref Range    Glucose 101 (H) 70 - 99 mg/dL   CBC with platelets differential   Result Value Ref Range    WBC 8.1 4.0 - 11.0 10e9/L    RBC Count 2.22 (L) 3.8 - 5.2 10e12/L    Hemoglobin 6.1 (LL) 11.7 - 15.7 g/dL    Hematocrit 21.3 (L) 35.0 - 47.0 %    MCV 96 78 - 100 fl    MCH 27.5 26.5 - 33.0 pg    MCHC 28.6 (L) 31.5 - 36.5 g/dL    RDW 15.8 (H) 10.0 - 15.0 %    Platelet Count 385 150 - 450 10e9/L    Diff Method Automated Method     % Neutrophils 74.2 %    % Lymphocytes 10.1 %    % Monocytes 14.2 %    % Eosinophils 0.4 %    % Basophils 0.5 %    % Immature Granulocytes 0.6 %    Nucleated RBCs 0 0 /100    Absolute Neutrophil 6.0 1.6 - 8.3 10e9/L    Absolute Lymphocytes 0.8 0.8 - 5.3 10e9/L    Absolute Monocytes 1.2 0.0 - 1.3 10e9/L    Absolute Eosinophils 0.0 0.0 - 0.7 10e9/L    Absolute Basophils 0.0 0.0 - 0.2 10e9/L    Abs Immature Granulocytes 0.1 0 - 0.4 10e9/L    Absolute Nucleated RBC 0.0    Magnesium (AM Draw)   Result Value Ref Range    Magnesium 2.0 1.6 - 2.3 mg/dL   Phosphorus   Result Value Ref Range    Phosphorus 5.5 (H) 2.5 - 4.5 mg/dL   Basic metabolic panel   Result Value Ref Range    Sodium 137 133 - 144 mmol/L    Potassium 6.4 (HH) 3.4 - 5.3 mmol/L    Chloride 113 (H) 94 - 109 mmol/L    Carbon Dioxide 17 (L) 20 - 32 mmol/L    Anion Gap 7 3 - 14 mmol/L    Glucose 111 (H) 70 - 99 mg/dL    Urea Nitrogen 83 (H) 7 - 30 mg/dL    Creatinine 3.11 (H) 0.52 - 1.04 mg/dL    GFR Estimate 14 (L) >60 mL/min/[1.73_m2]    GFR Estimate If Black 16 (L) >60 mL/min/[1.73_m2]    Calcium 8.2 (L) 8.5 - 10.1 mg/dL   CK total   Result Value Ref Range    CK Total 219 30 - 225 U/L   Glucose by meter   Result Value Ref Range    Glucose 103 (H) 70 - 99 mg/dL   Hepatitis B Surface Antibody   Result Value Ref Range    Hepatitis B Surface Antibody 0.00 <8.00 m[IU]/mL   Hepatitis B surface antigen   Result Value Ref  Range    Hep B Surface Agn Nonreactive NR^Nonreactive   Glucose by meter   Result Value Ref Range    Glucose 133 (H) 70 - 99 mg/dL   Glucose by meter   Result Value Ref Range    Glucose 108 (H) 70 - 99 mg/dL   Glucose by meter   Result Value Ref Range    Glucose 103 (H) 70 - 99 mg/dL   Asymptomatic COVID-19 Virus (Coronavirus) by PCR    Specimen: Nasopharyngeal   Result Value Ref Range    COVID-19 Virus PCR to [facility] - Source Nasopharyngeal     COVID-19 Virus PCR to [facility] - Result       Test received-See reflex to IDDL test SARS CoV2 (COVID-19) Virus RT-PCR   SARS-CoV-2 COVID-19 Virus (Coronavirus) RT-PCR Nasopharyngeal    Specimen: Nasopharyngeal   Result Value Ref Range    SARS-CoV-2 Virus Specimen Source Nasopharyngeal     SARS-CoV-2 PCR Result NEGATIVE     SARS-CoV-2 PCR Comment       Testing was performed using the Xpert Xpress SARS-CoV-2 Assay on the Cepheid Gene-Xpert   Instrument Systems. Additional information about this Emergency Use Authorization (EUA)   assay can be found via the Lab Guide.     Glucose by meter   Result Value Ref Range    Glucose 107 (H) 70 - 99 mg/dL   Social Work IP Consult    Narrative    White, Corinne C, LSW     10/9/2020  3:08 PM  Care Management Initial Consult    General Information  Assessment completed with:: Patient,    Type of CM/SW Visit: Initial Assessment     Readmission Within the Last 30 Days: no previous admission in   last 30 days     Reason for Consult: discharge planning  Advance Care Planning:            Communication Assessment  Patient's communication style: spoken language (English or   Bilingual)         Cognitive  Cognitive/Neuro/Behavioral: WDL                      Living Environment:   People in home: child(mehreen), adult  Name(s) of People in Home:   Bharat  Current living Arrangements: mobile home(ramp for enterance )            Family/Social Support:  Care provided by: self, other (see comments)(has Private pay   homemaker m-f 9-11)  Provides care for: no  one  Marital Status:   Who is your support system?: Children, Sibling(s)     Description of Support System: Supportive, Involved  Description   of Support System: Supportive, Involved  Adequate family and   caregiver support, Adequate social supports        Description of Support System: Supportive, Involved  Support Assessment: Adequate family and caregiver support,   Adequate social supports    Current Resources:   Skilled Home Care Services:    Community Resources: Housekeeping/Chore Agency- Pt pays privately   for a cleaning lady that she found on her own. This individual is   at her home M-F from 9-11 and will assist with batching,   cleaning, shopping and meals   Equipment currently used at home: wheelchair, manual, walker,   rolling(Ramp)  Supplies currently used at home: N/A      Employment:  Employment Status: retired        Financial/Environmental Concerns:            Lifestyle & Psychosocial Needs:        Socioeconomic History     Marital status:      Spouse name: Not on file     Number of children: Not on file     Years of education: Not on file     Highest education level: Not on file          Functional Status:  Prior to admission patient needed assistance: Meal preparation,   Laundry/Housekeeping, Shopping, Transportation   Assesssment of   Functional Status: Not at baseline with ADL Functioning, Not at   baseline with mobility, Not at  functional baseline                      Vaues/Beliefs:  Spiritual, Cultural Beliefs, Orthodox Practices, Values that   affect care: PT is Sabianism. Pt would appreciate a radha visit   while Hospitalized as she has not been able to attend [facility] for   a long time.            Additional Information:  Met with pt. She is frustrated with her son Bharat ( He is POA for   Finances only ). HE is trying to convince pt to accpt Dialysis .   Pt does not want to have this any further then today's treatment.   Pt  Is not open to any other outside support other  then her   private .,PT did at one point check into services   through the [facility] but made the decision to not utilize this   support     SW spoke with pt about her admission and illness. She is aware   that should she need further dialysis but does not accept this   that it could increase her health issues and possible put her in   a Hospice supported plan. Pt has chronic pain in her legs and   feet... Pt stated that maybe tomorrow she would think   differently. She was very clear that her sons Len and Beny have   her medical HCD at home.. Her HCD is also on [facility] at [facility]   SW will leonidas son to see if we can obtain copy for [facility]   records,.      At this time d.c needs are not clear. PT is open to the idea of   accepting home care support.   Pt does live in [place] but attends an [facility]     Corinne White [facility]  Inpatient Care Coordination   [phone]  [address] [place] Corinne HARLEEN Mack [facility]       Glucose by meter   Result Value Ref Range    Glucose 100 (H) 70 - 99 mg/dL   Glucose by meter   Result Value Ref Range    Glucose 97 70 - 99 mg/dL   Triple Lumen PICC Placement    Narrative    Hailey Hernandes RN     10/9/2020  2:26 PM  [facility]    Triple Lumen PICC Placement    Date/Time: 10/9/2020 11:59 AM  Performed by: Hailey Hernandes RN  Authorized by: Jillian Anderson MD   Indications: vascular access    UNIVERSAL PROTOCOL   Site Marked: No  Prior Images Obtained and Reviewed:  No  Required items: Required blood products, implants, devices and special   equipment available    Patient identity confirmed:  Verbally with patient and arm band  NA - No sedation, light sedation, or local anesthesia  Confirmation Checklist:  Patient's identity using two indicators, relevant   allergies, procedure was appropriate and matched the consent or emergent   situation and correct equipment/implants were available  Time out: Immediately prior  to the procedure a time out was called (with   Yola ATKINS)    Dix Protocol: the Joint Commission Universal Protocol was followed    Preparation: Patient was prepped and draped in usual sterile fashion    ESBL (mL):  2         ANESTHESIA    Local Anesthetic: Lidocaine 1% without epinephrine  Anesthetic Total (mL):  1.5      SEDATION    Patient Sedated: No        Preparation: skin prepped with ChloraPrep  Skin prep agent: skin prep agent completely dried prior to procedure  Sterile barriers: maximum sterile barriers were used: cap, mask, sterile   gown, sterile gloves, and large sterile sheet  Hand hygiene: hand hygiene performed prior to central venous catheter   insertion  Catheter type: triple lumen  Catheter size: 5 Fr  Brand: Bard  Lot number: [id number]  Placement method: venipuncture, MST and ultrasound  Number of attempts: 1  Successful placement: yes  Orientation: left  Location: basilic vein  Arm circumference: adults 10 cm  Extremity circumference: 43  Visible catheter length: 1  Internal length: 49 cm  Total catheter length: 50  Dressing and securement: blood cleaned with CHG, chlorhexidine patch   applied, dressing applied, occlusive dressing applied, statlock and   sterile dressing applied  Post procedure assessment: blood return through all ports, free fluid flow   and placement verified by x-ray  PROCEDURE   Patient Tolerance:  Patient tolerated the procedure well with no immediate   complications  Describe Procedure: Awaiting chest x-ray. Line is CAJ and good to use.   XR Chest Port 1 View    Narrative    XR CHEST PORT 1 VW 10/9/2020 12:43 PM    HISTORY: picc line placement    COMPARISON: Radiograph of earlier today      Impression    IMPRESSION: Interval placement of a left arm PICC with tip at the  SVC/right atrial junction. Right IJ central venous catheter tip is in  the high right atrium. Stable mild cardiomegaly. No focal pulmonary  infiltrate, pleural effusion or pneumothorax. Tortuous  aorta.    BELIA GAGE MD   Type and Screen (AM Draw)   Result Value Ref Range    Units Ordered 1     ABO O     RH(D) Neg     Antibody Screen Neg     Test Valid Only At [facility]        Specimen Expires 10/12/2020     Crossmatch Red Blood Cells    Blood gas venous with oxyhemoglobin   Result Value Ref Range    Ph Venous 7.42 7.32 - 7.43 pH    PCO2 Venous 39 (L) 40 - 50 mm Hg    PO2 Venous 31 25 - 47 mm Hg    Bicarbonate Venous 25 21 - 28 mmol/L    FIO2 Room Air     Oxyhemoglobin Venous 55 %    Base Excess Venous 0.6 mmol/L   Hemoglobin   Result Value Ref Range    Hemoglobin 5.8 (LL) 11.7 - 15.7 g/dL   Iron and iron binding capacity   Result Value Ref Range    Iron 17 (L) 35 - 180 ug/dL    Iron Binding Cap 259 240 - 430 ug/dL    Iron Saturation Index 7 (L) 15 - 46 %   Ferritin   Result Value Ref Range    Ferritin 14 8 - 252 ng/mL   Renal panel   Result Value Ref Range    Sodium 137 133 - 144 mmol/L    Potassium 4.5 3.4 - 5.3 mmol/L    Chloride 105 94 - 109 mmol/L    Carbon Dioxide 24 20 - 32 mmol/L    Anion Gap 8 3 - 14 mmol/L    Glucose 88 70 - 99 mg/dL    Urea Nitrogen 45 (H) 7 - 30 mg/dL    Creatinine 2.13 (H) 0.52 - 1.04 mg/dL    GFR Estimate 22 (L) >60 mL/min/[1.73_m2]    GFR Estimate If Black 26 (L) >60 mL/min/[1.73_m2]    Calcium 7.3 (L) 8.5 - 10.1 mg/dL    Phosphorus 3.3 2.5 - 4.5 mg/dL    Albumin 2.1 (L) 3.4 - 5.0 g/dL   Blood component   Result Value Ref Range    Unit Number [id number]     Blood Component Type Red Blood Cells Leukocyte Reduced     Division Number 00     Status of Unit Released to care unit 10/09/2020 1440     Blood Product Code [id number]     Unit Status ISS    UA reflex to Microscopic   Result Value Ref Range    Color Urine Yellow     Appearance Urine Slightly Cloudy     Glucose Urine Negative NEG^Negative mg/dL    Bilirubin Urine Negative NEG^Negative    Ketones Urine Negative NEG^Negative mg/dL    Specific Gravity Urine 1.024 1.003 - 1.035    Blood Urine Moderate (A)  NEG^Negative    pH Urine 5.5 5.0 - 7.0 pH    Protein Albumin Urine 70 (A) NEG^Negative mg/dL    Urobilinogen mg/dL 2.0 0.0 - 2.0 mg/dL    Nitrite Urine Negative NEG^Negative    Leukocyte Esterase Urine Large (A) NEG^Negative    Source Catheterized Urine     RBC Urine 12 (H) 0 - 2 /HPF    WBC Urine 52 (H) 0 - 5 /HPF    Squamous Epithelial /HPF Urine 1 0 - 1 /HPF    Mucous Urine Present (A) NEG^Negative /LPF    Hyaline Casts 8 (H) 0 - 2 /LPF

## 2020-10-10 ENCOUNTER — APPOINTMENT (OUTPATIENT)
Dept: PHYSICAL THERAPY | Facility: CLINIC | Age: 75
DRG: 682 | End: 2020-10-10
Attending: INTERNAL MEDICINE
Payer: MEDICARE

## 2020-10-10 LAB
ALBUMIN SERPL-MCNC: 2.3 G/DL (ref 3.4–5)
ANION GAP SERPL CALCULATED.3IONS-SCNC: 5 MMOL/L (ref 3–14)
ANION GAP SERPL CALCULATED.3IONS-SCNC: 7 MMOL/L (ref 3–14)
BUN SERPL-MCNC: 49 MG/DL (ref 7–30)
BUN SERPL-MCNC: 50 MG/DL (ref 7–30)
CALCIUM SERPL-MCNC: 7.4 MG/DL (ref 8.5–10.1)
CALCIUM SERPL-MCNC: 7.5 MG/DL (ref 8.5–10.1)
CHLORIDE SERPL-SCNC: 104 MMOL/L (ref 94–109)
CHLORIDE SERPL-SCNC: 108 MMOL/L (ref 94–109)
CO2 SERPL-SCNC: 24 MMOL/L (ref 20–32)
CO2 SERPL-SCNC: 25 MMOL/L (ref 20–32)
CREAT SERPL-MCNC: 2.09 MG/DL (ref 0.52–1.04)
CREAT SERPL-MCNC: 2.22 MG/DL (ref 0.52–1.04)
ERYTHROCYTE [DISTWIDTH] IN BLOOD BY AUTOMATED COUNT: 15.8 % (ref 10–15)
GFR SERPL CREATININE-BSD FRML MDRD: 21 ML/MIN/{1.73_M2}
GFR SERPL CREATININE-BSD FRML MDRD: 23 ML/MIN/{1.73_M2}
GLUCOSE BLDC GLUCOMTR-MCNC: 113 MG/DL (ref 70–99)
GLUCOSE BLDC GLUCOMTR-MCNC: 160 MG/DL (ref 70–99)
GLUCOSE BLDC GLUCOMTR-MCNC: 70 MG/DL (ref 70–99)
GLUCOSE BLDC GLUCOMTR-MCNC: 72 MG/DL (ref 70–99)
GLUCOSE BLDC GLUCOMTR-MCNC: 94 MG/DL (ref 70–99)
GLUCOSE SERPL-MCNC: 105 MG/DL (ref 70–99)
GLUCOSE SERPL-MCNC: 88 MG/DL (ref 70–99)
HCT VFR BLD AUTO: 26.1 % (ref 35–47)
HGB BLD-MCNC: 7.2 G/DL (ref 11.7–15.7)
HGB BLD-MCNC: 7.9 G/DL (ref 11.7–15.7)
MCH RBC QN AUTO: 27.5 PG (ref 26.5–33)
MCHC RBC AUTO-ENTMCNC: 30.3 G/DL (ref 31.5–36.5)
MCV RBC AUTO: 91 FL (ref 78–100)
PHOSPHATE SERPL-MCNC: 3.9 MG/DL (ref 2.5–4.5)
PLATELET # BLD AUTO: 334 10E9/L (ref 150–450)
POTASSIUM SERPL-SCNC: 5 MMOL/L (ref 3.4–5.3)
POTASSIUM SERPL-SCNC: 5.1 MMOL/L (ref 3.4–5.3)
RBC # BLD AUTO: 2.87 10E12/L (ref 3.8–5.2)
SODIUM SERPL-SCNC: 135 MMOL/L (ref 133–144)
SODIUM SERPL-SCNC: 138 MMOL/L (ref 133–144)
WBC # BLD AUTO: 7.5 10E9/L (ref 4–11)

## 2020-10-10 PROCEDURE — 94640 AIRWAY INHALATION TREATMENT: CPT

## 2020-10-10 PROCEDURE — 85027 COMPLETE CBC AUTOMATED: CPT | Performed by: INTERNAL MEDICINE

## 2020-10-10 PROCEDURE — 85018 HEMOGLOBIN: CPT | Performed by: INTERNAL MEDICINE

## 2020-10-10 PROCEDURE — 86335 IMMUNFIX E-PHORSIS/URINE/CSF: CPT | Mod: 26 | Performed by: STUDENT IN AN ORGANIZED HEALTH CARE EDUCATION/TRAINING PROGRAM

## 2020-10-10 PROCEDURE — 200N000001 HC R&B ICU

## 2020-10-10 PROCEDURE — 250N000011 HC RX IP 250 OP 636: Performed by: INTERNAL MEDICINE

## 2020-10-10 PROCEDURE — 250N000013 HC RX MED GY IP 250 OP 250 PS 637: Performed by: INTERNAL MEDICINE

## 2020-10-10 PROCEDURE — 80048 BASIC METABOLIC PNL TOTAL CA: CPT | Performed by: INTERNAL MEDICINE

## 2020-10-10 PROCEDURE — 999N001017 HC STATISTIC GLUCOSE BY METER IP

## 2020-10-10 PROCEDURE — 97530 THERAPEUTIC ACTIVITIES: CPT | Mod: GP

## 2020-10-10 PROCEDURE — 250N000013 HC RX MED GY IP 250 OP 250 PS 637: Performed by: NURSE PRACTITIONER

## 2020-10-10 PROCEDURE — 97140 MANUAL THERAPY 1/> REGIONS: CPT | Mod: GP

## 2020-10-10 PROCEDURE — 999N000157 HC STATISTIC RCP TIME EA 10 MIN

## 2020-10-10 PROCEDURE — 99233 SBSQ HOSP IP/OBS HIGH 50: CPT | Performed by: INTERNAL MEDICINE

## 2020-10-10 PROCEDURE — 97161 PT EVAL LOW COMPLEX 20 MIN: CPT | Mod: GP

## 2020-10-10 PROCEDURE — C9113 INJ PANTOPRAZOLE SODIUM, VIA: HCPCS | Performed by: INTERNAL MEDICINE

## 2020-10-10 PROCEDURE — 80069 RENAL FUNCTION PANEL: CPT | Performed by: INTERNAL MEDICINE

## 2020-10-10 PROCEDURE — 86335 IMMUNFIX E-PHORSIS/URINE/CSF: CPT | Mod: TC | Performed by: INTERNAL MEDICINE

## 2020-10-10 RX ORDER — METOPROLOL TARTRATE 1 MG/ML
2.5 INJECTION, SOLUTION INTRAVENOUS EVERY 4 HOURS PRN
Status: DISCONTINUED | OUTPATIENT
Start: 2020-10-10 | End: 2020-10-18

## 2020-10-10 RX ORDER — NICOTINE POLACRILEX 4 MG
15-30 LOZENGE BUCCAL
Status: DISCONTINUED | OUTPATIENT
Start: 2020-10-10 | End: 2020-10-21 | Stop reason: HOSPADM

## 2020-10-10 RX ORDER — DEXTROSE MONOHYDRATE 25 G/50ML
25-50 INJECTION, SOLUTION INTRAVENOUS
Status: DISCONTINUED | OUTPATIENT
Start: 2020-10-10 | End: 2020-10-21 | Stop reason: HOSPADM

## 2020-10-10 RX ADMIN — CETIRIZINE HYDROCHLORIDE 10 MG: 10 TABLET, FILM COATED ORAL at 08:52

## 2020-10-10 RX ADMIN — POLYETHYLENE GLYCOL 3350 17 G: 17 POWDER, FOR SOLUTION ORAL at 10:08

## 2020-10-10 RX ADMIN — Medication 5 MG: at 21:02

## 2020-10-10 RX ADMIN — GABAPENTIN 100 MG: 100 CAPSULE ORAL at 08:51

## 2020-10-10 RX ADMIN — APIXABAN 5 MG: 5 TABLET, FILM COATED ORAL at 13:28

## 2020-10-10 RX ADMIN — ACETAMINOPHEN 975 MG: 325 TABLET, FILM COATED ORAL at 08:52

## 2020-10-10 RX ADMIN — APIXABAN 5 MG: 5 TABLET, FILM COATED ORAL at 21:02

## 2020-10-10 RX ADMIN — PANTOPRAZOLE SODIUM 40 MG: 40 INJECTION, POWDER, FOR SOLUTION INTRAVENOUS at 08:54

## 2020-10-10 RX ADMIN — ACETAMINOPHEN 975 MG: 325 TABLET, FILM COATED ORAL at 15:44

## 2020-10-10 RX ADMIN — SENNOSIDES AND DOCUSATE SODIUM 1 TABLET: 8.6; 5 TABLET ORAL at 10:04

## 2020-10-10 RX ADMIN — SIMVASTATIN 10 MG: 10 TABLET, FILM COATED ORAL at 21:02

## 2020-10-10 RX ADMIN — CEFTRIAXONE 1 G: 1 INJECTION, POWDER, FOR SOLUTION INTRAMUSCULAR; INTRAVENOUS at 15:44

## 2020-10-10 RX ADMIN — FLUTICASONE FUROATE 1 PUFF: 200 POWDER RESPIRATORY (INHALATION) at 09:08

## 2020-10-10 RX ADMIN — Medication 5 MG: at 08:52

## 2020-10-10 RX ADMIN — ACETAMINOPHEN 975 MG: 325 TABLET, FILM COATED ORAL at 21:02

## 2020-10-10 ASSESSMENT — ACTIVITIES OF DAILY LIVING (ADL)
ADLS_ACUITY_SCORE: 25
ADLS_ACUITY_SCORE: 23
ADLS_ACUITY_SCORE: 25
ADLS_ACUITY_SCORE: 25
ADLS_ACUITY_SCORE: 23
ADLS_ACUITY_SCORE: 23

## 2020-10-10 ASSESSMENT — MIFFLIN-ST. JEOR: SCORE: 1980.25

## 2020-10-10 NOTE — PLAN OF CARE
ICU End of Shift Summary.  For vital signs and complete assessments, please see documentation flowsheets.     Pertinent assessments:  lymph wraps added today.  WOC also following.  Specialty bed ordered.  Improved UO today.  Taking good PO.  24hr urine sent.    Major Shift Events: up in chair today with assist of 3 and walker.  No dialysis needed today. Repeat CR 2. 09.    Plan (Upcoming Events):   Discharge/Transfer Needs:  TBD.  Pt will need TCU vs NH.  PT OT following.     Bedside Shift Report Completed :  yes  Bedside Safety Check Completed: yes

## 2020-10-10 NOTE — PROGRESS NOTES
Renal Medicine Progress Note            Assessment/Plan:     75 y.o woman with mantle cell lymphoma, diabetes, hypertension and obesity, admitted for leg swelling    Following for kidney injury and severe hyperkalemia.     # Apparent baseline chronic kidney disease.  Limited historical data for review, but most recent creatinine is in the range of approximately 1.0 dated 2017.  Estimated GFR in the range of approximately 60 mL per minute, representing at least stage III chronic kidney disease.  Unclear if underlying disease progression exists.  The exact renal baseline unknown.     # Acute kidney injury? TBD: Good urine output. K is okay.     # Hypertension: Admitted with hypotension. BP is much better.     # Severe hyperkalemia due to EVER, acidosis and lisinopril. Resolved with urgent dialysis.     # Acidosis: Lactic acidosis and advanced renal injury: Resolved with dialysis.     # Anemia without thrombocytopenia s/p transfuion.     # Mantle cell lymphoma: On Imbruvica     # Morbid obesity and lymphedema:     Plan:   # No urgency for hemodialysis today.   # 2 grams K restricted diet  # Renal panel order for 1700        Interval History:       Afebrile. BP is much better. She is laying in bed and eating lunch. She denies shortness of breath. No N/V.           Medications and Allergies:       acetaminophen  975 mg Oral TID     apixaban ANTICOAGULANT  5 mg Oral BID     cefTRIAXone  1 g Intravenous Q24H     cetirizine  10 mg Oral Daily     fluticasone  1 puff Inhalation Daily     gabapentin  100 mg Oral Daily     ibrutinib  560 mg Oral At Bedtime     oxybutynin  5 mg Oral BID     pantoprazole (PROTONIX) IV  40 mg Intravenous Daily with breakfast     simvastatin  10 mg Oral At Bedtime     sodium chloride (PF)  10 mL Intracatheter Q8H        Allergies   Allergen Reactions     Azithromycin Hives     Ciprofloxacin      wheezing     Morphine Hives     Nitrofurantoin      Constipation, wheezing            Physical Exam:  "  Vitals were reviewed   , Blood pressure 125/67, pulse 99, temperature 98.7  F (37.1  C), temperature source Oral, resp. rate 14, height 1.778 m (5' 10\"), weight 140 kg (308 lb 10.3 oz), SpO2 98 %.    Wt Readings from Last 3 Encounters:   10/10/20 140 kg (308 lb 10.3 oz)       Intake/Output Summary (Last 24 hours) at 10/10/2020 1247  Last data filed at 10/10/2020 1100  Gross per 24 hour   Intake 2621 ml   Output 825 ml   Net 1796 ml     GENERAL APPEARANCE: alert and no distress  EYES:  no scleral icterus, pupils equal  PULM: Clear anteriorly.  No cyanosis.  CV: Irregular, tachy. No MGR.   GI: obese, soft, NT  NEURO: Awake and oriented x 3. Answering all questions.   EXT: Edematous.   Access -right IJ temporary catheter placed on 10/9/2020         Data:     CBC RESULTS:     Recent Labs   Lab 10/10/20  0525 10/10/20  0212 10/09/20  2037 10/09/20  1950 10/09/20  1315 10/09/20  0548 10/08/20  2043   WBC 7.5  --   --   --   --  8.1 11.3*   RBC 2.87*  --   --   --   --  2.22* 2.67*   HGB 7.9* 7.2*  --  6.5* 5.8* 6.1* 7.2*   HCT 26.1*  --   --  21.9*  --  21.3* 25.9*     --  316  --   --  385 431       Basic Metabolic Panel:  Recent Labs   Lab 10/10/20  0525 10/09/20  1315 10/09/20  0548 10/09/20  0332 10/09/20  0258 10/09/20  0051 10/08/20  2043    137 137 138  --  136 133   POTASSIUM 5.0 4.5 6.4* 6.4* 6.8* 7.1* 7.5*   CHLORIDE 108 105 113* 114*  --  113* 112*   CO2 25 24 17* 17*  --  18* 13*   BUN 50* 45* 83* 83*  --  85* 80*   CR 2.22* 2.13* 3.11* 3.25*  --  3.27* 3.14*   GLC 88 88 111* 145*  --  129* 90   LEE 7.4* 7.3* 8.2* 8.2*  --  8.3* 8.4*       INR  Recent Labs   Lab 10/09/20  2037   INR 1.29*      Attestation:   I have reviewed today's relevant vital signs, notes, medications, labs and imaging.    Sammy Mar MD  [facility] - Nephrology  Office phone :[phone]  Pager: [phone]  "

## 2020-10-10 NOTE — PROGRESS NOTES
Called for blood pressure MAPs in the 50s. According to the nurse, hospitalist wanted to monitor without intervention due to stable nature of patient  Change of shift and nurse    Post Dialysis   Patient is currently sleeping, but otherwise     Hb was 5.8 and according to the nurse, patient got a unit of blood  No Hb check done post transfusion    Plan:    Repeat Hb/HCT  We will also check coagulation status and platelet count  Consider one more unit of blood

## 2020-10-10 NOTE — PROGRESS NOTES
[facility]    Hospitalist Progress Note  Name: Waleska Marie    MRN: [id number]  Provider: Jillian Anderson MD  Date of Service: 10/10/2020    Assessment & Plan   Summary of Stay: Waleska Marie is a 75 year old female who was admitted on 10/8/2020 for acute on chronic kidney disease stage III non-anion gap metabolic acidosis and severe hyperkalemia.  He initially presented with leg swelling.    His past medical history significant for type 2 diabetes mellitus, atrial fibrillation, stage III chronic kidney disease, severe lymphedema, morbid obesity, COPD, asthma, mantle cell lymphoma.    He received emergent dialysis today for intractable hyperkalemia nonresponsive to several rounds of insulin D50 on admission    Underwent emergent dialysis on 10/9/2020.  Potassium and renal functions improved after dialysis, hemoglobin was down to 5.8 received blood transfusions 1 unit of PRBCs.     10/10/2020: Overnight received another unit of PRBC.  Hemoglobin up to 7.9      Hyperkalemia, EVER on at least CKD stage III, NAGMA:   -Presented to outside facility with creatinine 2.75, potassium 7.9, bicarb 12 without elevated anion gap.  Last creatinine on file from 3 years ago was 1.3.   - EKG showed slightly peaked T waves along with LBBB.   -PTA She is on lisinopril, Lasix, metformin, and potassium 20 mEq twice daily at baseline which are all likely to result in  EVER, hyperkalemia, and NAGMA.    -Kramer catheter placed and urine appears dark carlos.  - She was shifted twice with IV insulin/D50 at outside facility and also received 40 mg IV Lasix, 2 L IV normal saline, multiple albuterol nebs, and calcium gluconate 2 g x 2.   - Repeat potassium was 7.4 and she was transferred here.    -On admission at [facility] potassium was 7.5, creatinine up to 3.4, bicarbonate 13.  -Nephrology consulted, evaluated patient at the bedside this evening, greatly appreciate recommendations.    -Improving her acidosis should improve  hyperkalemia.    -Received insulin D50 and sodium bicarbonate push now then start 150meq sodium bicarbonate drip at 150 ml/hr  -Telemetry  -Currently on both aspirin and Eliquis.  This along with her morbid obesity and lymphedema would make dialysis catheter placement quite challenging and likely would need to be done by IR  -Kramer catheter for strict intake and output  -Emergent dialysis 10/9/2020  -Potassium 5, creatinine  around 2.2     Hypotension:   -Blood pressure softer in the 80-90 systolic range although it is a forearm cuff measurement.   -she was likely intravascularly dry.  She received 2 L normal saline at outside facility.  Doubt this is from sepsis as she may have a UTI although not certain about this and lactic acid not elevated.  -Received 25 g 25% IV albumin now and then starting bicarbonate infusion at 150 ml/hr  -After dialysis was off bicarb drip and insulin  -Blood pressure improved after transfusions.    -Blood pressure is relatively stable.     Possible UTI:   -Urinalysis grossly abnormal with > 100 WBCs large blood, moderate LE, and positive nitrite.  Slight leukocytosis 11.3.  Afebrile.  UTI in the differential, however no symptoms for this.  Received -ceftriaxone at outside facility.  -Continue ceftriaxone 1 g every 24 hours  -Follow urine culture results     Lymphedema: Severe lymphedema is a chronic issue for the patient.  Resulting in blistering of the skin and weeping.  Has chronic slight erythema to both legs from the feet all the way up to the hip.  Difficult to say if any infection here, she does not it appears different than her baseline.  Takes 40 mg Lasix daily for this.    -TTE from 2017 does not showed , in the past had some reduction in EF of 55-60%, slight elevated right-sided pressures, and diastolic function was indeterminate.  Per the TTE report, she previously had lower EF.  -Holding diuretics as she appears intravascularly dry as above  -Wound nurse consult  -Having  severe pain so ordered oral Dilaudid 2-4 mg every 4 hours as needed  -OT consult for lymphedema    Acute on chronic normocytic anemia:   Hemoglobin previously 8-9 range in 2017.    -Denies any bloody stools or other blood loss.  This is in part due to her CKD.  Also, likely has chronic inflammation from her lymphedema with chronic wounds.  -drop in hemoglobin from 7.7-5.8  -Received 2 units of PRBCs  -No evidence of acute GI bleed will start on PPI  -Hemoglobin up to 7.9 this morning    Morbid obesity: weight is 304 lb, a fair amount of this is fluid.     A. fib, LBBB, HTN: Chronically on Eliquis for anticoagulation.  Also takes metoprolol tartrate 25 mg twice daily and digoxin 125 mcg daily.  Mildly tachycardic here with heart rate 100.  EKG shows LBBB, unclear if new or not.  -Checked digoxin level which is not elevated 1.2 despite the renal dysfunction  -Telemetry  -We will carefully resume Eliquis.  No signs of active bleeding.  -Holding metoprolol due to soft blood pressure     IDDM type II:   -PTA on 35 units Lantus at bedtime along with metformin 500 mg twice daily.  She did not take Lantus the night prior to presentation.  Blood glucose on admission here is in the 90s, she did receive 10 units IV insulin x2 along with D50 at outside facility.  -Was initially treated with insulin drip and IV fluids  -Now on sliding scale insulin.        Mantle cell lymphoma: PTA on imbruvica 140 mg daily for mantle cell lymphoma involving lymph nodes in the neck.    - Resumed PTA dose.     COPD, asthma: Remote history of smoking.  She reports asthma.  PTA on Flovent and albuterol inhaler.  She has chronic shortness of breath, but this is worse the past 5 days.  Chest x-ray showed cardiomegaly, but no infiltrate.  -Resume PTA Flovent, duonebs as needed     Urinary incontinence: Resumed oxybutynin 5 mg twice daily.     HLD: CK level normal.  Resumed simvastatin 10 mg daily.    Decubiti ulcer  -Wound care  consult    Disposition  -Consulted social work.  Patient lives with her son not sure if she has adequate resources  -Consulted palliative care as patient is declining any further dialysis        DVT Prophylaxis: Right admission patient on Eliquis will likely resume once patient is stable for now patient has drop in hemoglobin we will monitor carefully.  Will resume Eliquis if hemoglobin remains stable  Code Status: Full Code    Disposition: Expected discharge at least 2 to 3 days.    Interval History   Reviewed chart.  C clinically patient is feeling much better today denies any chest pain.  No shortness of breath.  Received 2 units of PRBCs.  Has low-grade temperature.  Review of other symptoms are negative    -Data reviewed today: I reviewed all new labs and imaging reports over the last 24 hours. I personally reviewed no images or EKG's today.    Physical Exam   Temp: 98.7  F (37.1  C) Temp src: Oral BP: 129/60 Pulse: 103   Resp: 15 SpO2: 98 % O2 Device: None (Room air)    Vitals:    10/08/20 2300 10/10/20 0530   Weight: 138.2 kg (304 lb 10.8 oz) 140 kg (308 lb 10.3 oz)     Vital Signs with Ranges  Temp:  [98.7  F (37.1  C)-100  F (37.8  C)] 98.7  F (37.1  C)  Pulse:  [] 103  Resp:  [8-28] 15  BP: ()/(35-84) 129/60  SpO2:  [92 %-100 %] 98 %  I/O last 3 completed shifts:  In: 2142.62 [I.V.:1353.87]  Out: 850 [Urine:850]      Constitutional: Awake, does not appear in distress, chronically ill-appearing  Eyes: sclera white  HEENT: atraumatic, dry mucous membranes  Respiratory: Anterior lung fields clear, no crackles or wheeze  Cardiovascular:  Irregularly irregular tachycardia without murmur  GI: Obese, non-tender, not distended, bowel sounds present  Lymph/Hematologic: no cervical, axillary, inguinal adenopathy  Genitourinary: Kramer catheter with dark carlos urine in the bag  Skin: Diffuse slight pink erythema to entire legs bilaterally.  Multiple bulla.  Few open bulla without any sign of pus drainage,  just clear fluid drainage.  Decubiti noted in back and buttock musculoskeletal/extremities: 3+ lymphedema bilaterally to the hips.  Neurologic: A&Ox3, speech clear, moving all extremities equally  Psychiatric: calm, cooperative     Medications     dextrose       sodium chloride 10 mL/hr at 10/10/20 0615       acetaminophen  975 mg Oral TID     cefTRIAXone  1 g Intravenous Q24H     cetirizine  10 mg Oral Daily     fluticasone  1 puff Inhalation Daily     gabapentin  100 mg Oral Daily     ibrutinib  560 mg Oral At Bedtime     oxybutynin  5 mg Oral BID     pantoprazole (PROTONIX) IV  40 mg Intravenous Daily with breakfast     simvastatin  10 mg Oral At Bedtime     sodium chloride (PF)  10 mL Intracatheter Q8H     Data     Recent Labs   Lab 10/09/20  1315 10/09/20  0332 10/09/20  0051   PHV 7.42 7.23* 7.20*   PO2V 31 23* 20*   PCO2V 39* 41 40   HCO3V 25 17* 16*     Recent Labs   Lab 10/10/20  0525 10/10/20  0212 10/09/20  2037 10/09/20  1950 10/09/20  0548 10/09/20  0548 10/08/20  2043   WBC 7.5  --   --   --   --  8.1 11.3*   HGB 7.9* 7.2*  --  6.5*   < > 6.1* 7.2*   HCT 26.1*  --   --  21.9*  --  21.3* 25.9*   MCV 91  --   --   --   --  96 97     --  316  --   --  385 431    < > = values in this interval not displayed.     Recent Labs   Lab 10/10/20  0525 10/09/20  1315 10/09/20  0548    137 137   POTASSIUM 5.0 4.5 6.4*   CHLORIDE 108 105 113*   CO2 25 24 17*   ANIONGAP 5 8 7   GLC 88 88 111*   BUN 50* 45* 83*   CR 2.22* 2.13* 3.11*   GFRESTIMATED 21* 22* 14*   GFRESTBLACK 24* 26* 16*   LEE 7.4* 7.3* 8.2*     No results for input(s): CULT in the last 168 hours.  No results for input(s): NTBNPI, NTBNP in the last 168 hours.  Recent Labs   Lab 10/10/20  0525 10/09/20  1315 10/09/20  0548   CR 2.22* 2.13* 3.11*     No results for input(s): SED, CRP in the last 168 hours.  GFR Estimate   Date Value Ref Range Status   10/10/2020 21 (L) >60 mL/min/[1.73_m2] Final     Comment:     Non  GFR  Calc  Starting 12/18/2018, serum creatinine based estimated GFR (eGFR) will be   calculated using the Chronic Kidney Disease Epidemiology Collaboration   (CKD-EPI) equation.     10/09/2020 22 (L) >60 mL/min/[1.73_m2] Final     Comment:     Non  GFR Calc  Starting 12/18/2018, serum creatinine based estimated GFR (eGFR) will be   calculated using the Chronic Kidney Disease Epidemiology Collaboration   (CKD-EPI) equation.     10/09/2020 14 (L) >60 mL/min/[1.73_m2] Final     Comment:     Non  GFR Calc  Starting 12/18/2018, serum creatinine based estimated GFR (eGFR) will be   calculated using the Chronic Kidney Disease Epidemiology Collaboration   (CKD-EPI) equation.       GFR Estimate If Black   Date Value Ref Range Status   10/10/2020 24 (L) >60 mL/min/[1.73_m2] Final     Comment:      GFR Calc  Starting 12/18/2018, serum creatinine based estimated GFR (eGFR) will be   calculated using the Chronic Kidney Disease Epidemiology Collaboration   (CKD-EPI) equation.     10/09/2020 26 (L) >60 mL/min/[1.73_m2] Final     Comment:      GFR Calc  Starting 12/18/2018, serum creatinine based estimated GFR (eGFR) will be   calculated using the Chronic Kidney Disease Epidemiology Collaboration   (CKD-EPI) equation.     10/09/2020 16 (L) >60 mL/min/[1.73_m2] Final     Comment:      GFR Calc  Starting 12/18/2018, serum creatinine based estimated GFR (eGFR) will be   calculated using the Chronic Kidney Disease Epidemiology Collaboration   (CKD-EPI) equation.       Recent Labs   Lab 10/10/20  0748 10/10/20  0525 10/10/20  0309 10/09/20  2342 10/09/20  1949 10/09/20  1635 10/09/20  1315 10/09/20  0548 10/09/20  0548 10/09/20  0332 10/09/20  0332 10/09/20  0051 10/09/20  0051   GLC  --  88  --   --   --   --  88  --  111*  --  145*  --  129*   BGM 70  --  72 87 86 71  --    < >  --    < >  --    < >  --     < > = values in this interval not displayed.      Recent Labs   Lab 10/10/20  0525 10/10/20  0212 10/09/20  1950   HGB 7.9* 7.2* 6.5*     Recent Labs   Lab 10/08/20  2043   AST 12   ALT 13   ALKPHOS 97   BILITOTAL 0.3     Recent Labs   Lab 10/09/20  2037   INR 1.29*     Recent Labs   Lab 10/09/20  1950 10/08/20  2043   LACT 0.5* 0.9     No results for input(s): LIPASE in the last 168 hours.  Recent Labs   Lab 10/10/20  0525 10/09/20  1315 10/09/20  0548   BUN 50* 45* 83*   CR 2.22* 2.13* 3.11*     No results for input(s): TSH in the last 168 hours.  Recent Labs   Lab 10/08/20  2043   TROPI <0.015     Recent Labs   Lab 10/09/20  1404   COLOR Yellow   APPEARANCE Slightly Cloudy   URINEGLC Negative   URINEBILI Negative   URINEKETONE Negative   SG 1.024   UBLD Moderate*   URINEPH 5.5   PROTEIN 70*   NITRITE Negative   LEUKEST Large*   RBCU 12*   WBCU 52*       Recent Results (from the past 24 hour(s))   XR Chest Port 1 View    Narrative    XR CHEST PORT 1 VW 10/9/2020 12:43 PM    HISTORY: picc line placement    COMPARISON: Radiograph of earlier today      Impression    IMPRESSION: Interval placement of a left arm PICC with tip at the  SVC/right atrial junction. Right IJ central venous catheter tip is in  the high right atrium. Stable mild cardiomegaly. No focal pulmonary  infiltrate, pleural effusion or pneumothorax. Tortuous aorta.    BELIA GAGE MD

## 2020-10-10 NOTE — PROGRESS NOTES
"SPIRITUAL HEALTH SERVICES Progress Note  [facility] ICU 3rd floor    Visited with patient due to patient request communicated to Spiritual Health by .  Engaged in conversation about current hospitalization.  Waleska stated she experienced \"the worst pain in my life\" yesterday during dialysis and \"unless it's a life or death situation, I do not want to do dialysis again.\"  Facilitated processing of grief around brother's death a few days ago and that Waleska was not able to attend the  yesterday.  Explored resiliency and coping strategies that have sustained her through cancer and the death of family members.  Waleska focuses on the fact that \"other people have it worse off than I do\" and this helps her cope.  Waleska also finds comfort and strength in her Islam linda through prayer and watching Mass on TV.  She hopes to \"get better\", go home, and does not want to return to a nursing home because she had a \"bad experience\" previously.  Waleska welcomed prayer and indicated she would appreciate future  visits.  Spiritual Health will continue to follow patient.      Chani Johnson  Chaplain Resident  "

## 2020-10-10 NOTE — PROGRESS NOTES
"   10/10/20 1300   Quick Adds   Type of Visit Initial PT Evaluation   Living Environment   People in home child(mehreen), adult   Current Living Arrangements   (Trailer home)   Home Accessibility no concerns   Living Environment Comments Pt lives with adult son in trailer home with ramp to enter. Pt reports getting help from someone she found privately at her home M-F from 9-11 that assists with bathing, cleaning, shopping and meals    Self-Care   Usual Activity Tolerance fair   Equipment Currently Used at Home wheelchair, manual;walker, standard   Activity/Exercise/Self-Care Comment Pt uses walker at baseline for short distance ambulation, but manual wheelchair for longer distances outside of the home. Pt reports she manages her own toileting and dressing cares.    Disability/Function   Walking or Climbing Stairs ambulation difficulty, requires equipment   Dressing/Bathing Difficulty yes   Dressing/Bathing bathing difficulty, dependent   Doing Errands Independently Difficulty (such as shopping) yes   Fall history within last six months no   General Information   Onset of Illness/Injury or Date of Surgery 10/08/20   Referring Physician Nahum Hinson MD   Patient/Family Therapy Goals Statement (PT) Not stated   Pertinent History of Current Problem (include personal factors and/or comorbidities that impact the POC) Pt  is a 75 year old female who was admitted on 10/8/2020 for acute on chronic kidney disease stage III non-anion gap metabolic acidosis and severe hyperkalemia.  He initially presented with leg swelling.   Existing Precautions/Restrictions fall   Edema General Information   Onset of Edema   (\"years\")   Affected Body Part(s) Left LE;Right LE   Edema Etiology Unknown   Etiology Comments Patient manages her lymphedema at home with boots bilaterally from feet>groin. They currently do not fit and pt is weeping through them.    Edema Precautions Renal Insufficiency   Edema Examination/Assessment   Skin " Condition Pitting   Skin Condition Comments Pt with wounds to bilateral shins, flaking/dry skin to bilateral ankles, redness noted to bilat LEs from toes to groin (pt reports this is baseline). Drainage noted to bilateral LE.    Skin Integrity Overall pt with poor skin integrity and hygiene.    Cognition   Orientation Status (Cognition) oriented x 4   Follows Commands (Cognition) follows one-step commands   Safety Deficit (Cognition) awareness of need for assistance;insight into deficits/self-awareness   Pain Assessment   Patient Currently in Pain No   Range of Motion (ROM)   ROM Comment Decreased R UE AROM (pt reports baseline); decreased bilateral LE AROM.    Strength   Strength Comments Decreased generally; physical assist needed with mobility as noted below.    Bed Mobility   Comment (Bed Mobility) Supine>sit with mod-maxAx3.    Transfers   Transfer Safety Comments Sit>stand with minAx3.    Gait/Stairs (Locomotion)   Comment (Gait/Stairs) Ambulates with FWW and CGA-minAx3.    Balance   Balance Comments Requires bilat UE support on FWW and external assist for safe dynamic mobility   Clinical Impression   Criteria for Skilled Therapeutic Intervention yes, treatment indicated   PT Diagnosis (PT) Impaired functional mobility   Edema: Patient Presentation Stage 3 Lymphedema   Influenced by the following impairments Global weakness, impaired self care, impaired balance, pain, decreased activity tolerance   Functional limitations due to impairments Decreased IND with bed mobility, transfers, ambulation   Clinical Presentation Stable/Uncomplicated   Clinical Presentation Rationale Pt is medically stable   Clinical Decision Making (Complexity) low complexity   Therapy Frequency (PT) Daily   Predicted Duration of Therapy Intervention (days/wks) 3 days   Planned Therapy Interventions (PT) balance training;bed mobility training;gait training;home exercise program;strengthening;transfer training   Edema: Planned  "Interventions Gradient compression bandaging;Edema exercises;Education   Risk & Benefits of therapy have been explained evaluation/treatment results reviewed;care plan/treatment goals reviewed;risks/benefits reviewed;current/potential barriers reviewed;participants voiced agreement with care plan;patient   Clinical Impression Comments Pt appropriate for PT eval for mobility and compression garments. Pt not able to fit into baseline garments and she currently has wounds. Quick wraps appropriate to faciliate wound healing and fluid mgmt.    PT Discharge Planning    PT Discharge Recommendation (DC Rec) Transitional Care Facility   PT Rationale for DC Rec Pt below baseline for mobility. Ax3 for transfers and short distance ambulation. Question pts ability to care for self at current time. Would benefit from PT to address strength, balance and activity tolerance to maximize IND with mbility.    PT Brief overview of current status  Will require lift A into/out of bed with RN staff. Appropriate for SPT to commode with FWW.   [facility] [employer] TM \"6 Clicks\"   2016, Trustees of [facility], under license to [employer].  All rights reserved.   6 Clicks Short Forms Basic Mobility Inpatient Short Form   [facility]  \"6 Clicks\" V.2 Basic Mobility Inpatient Short Form   1. Turning from your back to your side while in a flat bed without using bedrails? 2 - A Lot   2. Moving from lying on your back to sitting on the side of a flat bed without using bedrails? 2 - A Lot   3. Moving to and from a bed to a chair (including a wheelchair)? 2 - A Lot   4. Standing up from a chair using your arms (e.g., wheelchair, or bedside chair)? 2 - A Lot   5. To walk in hospital room? 2 - A Lot   6. Climbing 3-5 steps with a railing? 1 - Total   Basic Mobility Raw Score (Score out of 24.Lower scores equate to lower levels of function) 11   Total Evaluation Time   Total Evaluation Time (Minutes) 10     "

## 2020-10-10 NOTE — PLAN OF CARE
ICU End of Shift Summary.  For vital signs and complete assessments, please see documentation flowsheets.     Pertinent assessments: AO. Reporting pain as controlled with scheduled tylenol. Afebrile. Tele Afib. Hypotension improved with I unit of blood. Lungs diminished/clear. RA. Denies nausea. Kramer in place with lower urine output.  Major Shift Events: hypotensive. 1 uint PRBCs  Plan (Upcoming Events): Continue current plan of care  Discharge/Transfer Needs: Continue current plan of care    Bedside Shift Report Completed : Y  Bedside Safety Check Completed: Y

## 2020-10-11 ENCOUNTER — APPOINTMENT (OUTPATIENT)
Dept: PHYSICAL THERAPY | Facility: CLINIC | Age: 75
DRG: 682 | End: 2020-10-11
Attending: INTERNAL MEDICINE
Payer: MEDICARE

## 2020-10-11 ENCOUNTER — APPOINTMENT (OUTPATIENT)
Dept: OCCUPATIONAL THERAPY | Facility: CLINIC | Age: 75
DRG: 682 | End: 2020-10-11
Attending: INTERNAL MEDICINE
Payer: MEDICARE

## 2020-10-11 LAB
ANION GAP SERPL CALCULATED.3IONS-SCNC: 6 MMOL/L (ref 3–14)
BASOPHILS # BLD AUTO: 0.1 10E9/L (ref 0–0.2)
BASOPHILS NFR BLD AUTO: 0.8 %
BUN SERPL-MCNC: 52 MG/DL (ref 7–30)
CALCIUM SERPL-MCNC: 7.2 MG/DL (ref 8.5–10.1)
CHLORIDE SERPL-SCNC: 105 MMOL/L (ref 94–109)
CO2 SERPL-SCNC: 24 MMOL/L (ref 20–32)
CREAT SERPL-MCNC: 1.89 MG/DL (ref 0.52–1.04)
DIFFERENTIAL METHOD BLD: ABNORMAL
EOSINOPHIL # BLD AUTO: 0.1 10E9/L (ref 0–0.7)
EOSINOPHIL NFR BLD AUTO: 1.4 %
ERYTHROCYTE [DISTWIDTH] IN BLOOD BY AUTOMATED COUNT: 15.8 % (ref 10–15)
GFR SERPL CREATININE-BSD FRML MDRD: 26 ML/MIN/{1.73_M2}
GLUCOSE BLDC GLUCOMTR-MCNC: 105 MG/DL (ref 70–99)
GLUCOSE BLDC GLUCOMTR-MCNC: 116 MG/DL (ref 70–99)
GLUCOSE BLDC GLUCOMTR-MCNC: 137 MG/DL (ref 70–99)
GLUCOSE BLDC GLUCOMTR-MCNC: 168 MG/DL (ref 70–99)
GLUCOSE BLDC GLUCOMTR-MCNC: 99 MG/DL (ref 70–99)
GLUCOSE SERPL-MCNC: 105 MG/DL (ref 70–99)
HCT VFR BLD AUTO: 24 % (ref 35–47)
HGB BLD-MCNC: 7.1 G/DL (ref 11.7–15.7)
HGB BLD-MCNC: 7.9 G/DL (ref 11.7–15.7)
IMM GRANULOCYTES # BLD: 0.1 10E9/L (ref 0–0.4)
IMM GRANULOCYTES NFR BLD: 1 %
LYMPHOCYTES # BLD AUTO: 1.5 10E9/L (ref 0.8–5.3)
LYMPHOCYTES NFR BLD AUTO: 19.1 %
MCH RBC QN AUTO: 27.3 PG (ref 26.5–33)
MCHC RBC AUTO-ENTMCNC: 29.6 G/DL (ref 31.5–36.5)
MCV RBC AUTO: 92 FL (ref 78–100)
MONOCYTES # BLD AUTO: 1 10E9/L (ref 0–1.3)
MONOCYTES NFR BLD AUTO: 13.4 %
NEUTROPHILS # BLD AUTO: 5 10E9/L (ref 1.6–8.3)
NEUTROPHILS NFR BLD AUTO: 64.3 %
NRBC # BLD AUTO: 0 10*3/UL
NRBC BLD AUTO-RTO: 0 /100
PLATELET # BLD AUTO: 324 10E9/L (ref 150–450)
POTASSIUM SERPL-SCNC: 4.4 MMOL/L (ref 3.4–5.3)
RBC # BLD AUTO: 2.6 10E12/L (ref 3.8–5.2)
SODIUM SERPL-SCNC: 135 MMOL/L (ref 133–144)
WBC # BLD AUTO: 7.7 10E9/L (ref 4–11)

## 2020-10-11 PROCEDURE — 85025 COMPLETE CBC W/AUTO DIFF WBC: CPT | Performed by: INTERNAL MEDICINE

## 2020-10-11 PROCEDURE — C9113 INJ PANTOPRAZOLE SODIUM, VIA: HCPCS | Performed by: INTERNAL MEDICINE

## 2020-10-11 PROCEDURE — 94640 AIRWAY INHALATION TREATMENT: CPT

## 2020-10-11 PROCEDURE — 250N000011 HC RX IP 250 OP 636: Performed by: INTERNAL MEDICINE

## 2020-10-11 PROCEDURE — 250N000013 HC RX MED GY IP 250 OP 250 PS 637: Performed by: INTERNAL MEDICINE

## 2020-10-11 PROCEDURE — 97166 OT EVAL MOD COMPLEX 45 MIN: CPT | Mod: GO | Performed by: OCCUPATIONAL THERAPIST

## 2020-10-11 PROCEDURE — 999N001017 HC STATISTIC GLUCOSE BY METER IP

## 2020-10-11 PROCEDURE — 97110 THERAPEUTIC EXERCISES: CPT | Mod: GO | Performed by: OCCUPATIONAL THERAPIST

## 2020-10-11 PROCEDURE — 120N000001 HC R&B MED SURG/OB

## 2020-10-11 PROCEDURE — 99207 PR CDG-CHARGE REQUIRED MANUAL ENTRY: CPT | Performed by: NURSE PRACTITIONER

## 2020-10-11 PROCEDURE — 99232 SBSQ HOSP IP/OBS MODERATE 35: CPT | Performed by: NURSE PRACTITIONER

## 2020-10-11 PROCEDURE — 250N000013 HC RX MED GY IP 250 OP 250 PS 637: Performed by: NURSE PRACTITIONER

## 2020-10-11 PROCEDURE — 99233 SBSQ HOSP IP/OBS HIGH 50: CPT | Performed by: INTERNAL MEDICINE

## 2020-10-11 PROCEDURE — 85018 HEMOGLOBIN: CPT | Performed by: INTERNAL MEDICINE

## 2020-10-11 PROCEDURE — 97140 MANUAL THERAPY 1/> REGIONS: CPT | Mod: GP

## 2020-10-11 PROCEDURE — 97530 THERAPEUTIC ACTIVITIES: CPT | Mod: GP

## 2020-10-11 PROCEDURE — 97535 SELF CARE MNGMENT TRAINING: CPT | Mod: GO | Performed by: OCCUPATIONAL THERAPIST

## 2020-10-11 PROCEDURE — 80048 BASIC METABOLIC PNL TOTAL CA: CPT | Performed by: INTERNAL MEDICINE

## 2020-10-11 PROCEDURE — 999N000157 HC STATISTIC RCP TIME EA 10 MIN

## 2020-10-11 RX ORDER — PANTOPRAZOLE SODIUM 40 MG/1
40 TABLET, DELAYED RELEASE ORAL
Status: DISCONTINUED | OUTPATIENT
Start: 2020-10-12 | End: 2020-10-21 | Stop reason: HOSPADM

## 2020-10-11 RX ORDER — GABAPENTIN 100 MG/1
100 CAPSULE ORAL ONCE
Status: DISCONTINUED | OUTPATIENT
Start: 2020-10-11 | End: 2020-10-11

## 2020-10-11 RX ORDER — GABAPENTIN 100 MG/1
100 CAPSULE ORAL 2 TIMES DAILY
Status: DISCONTINUED | OUTPATIENT
Start: 2020-10-12 | End: 2020-10-13

## 2020-10-11 RX ORDER — GABAPENTIN 100 MG/1
100 CAPSULE ORAL 2 TIMES DAILY
Status: DISCONTINUED | OUTPATIENT
Start: 2020-10-11 | End: 2020-10-11

## 2020-10-11 RX ORDER — GABAPENTIN 100 MG/1
100 CAPSULE ORAL AT BEDTIME
Status: DISCONTINUED | OUTPATIENT
Start: 2020-10-11 | End: 2020-10-13

## 2020-10-11 RX ADMIN — Medication 5 MG: at 07:49

## 2020-10-11 RX ADMIN — FLUTICASONE FUROATE 1 PUFF: 200 POWDER RESPIRATORY (INHALATION) at 08:42

## 2020-10-11 RX ADMIN — GABAPENTIN 100 MG: 100 CAPSULE ORAL at 07:52

## 2020-10-11 RX ADMIN — ACETAMINOPHEN 975 MG: 325 TABLET, FILM COATED ORAL at 21:03

## 2020-10-11 RX ADMIN — PANTOPRAZOLE SODIUM 40 MG: 40 INJECTION, POWDER, FOR SOLUTION INTRAVENOUS at 07:53

## 2020-10-11 RX ADMIN — APIXABAN 5 MG: 5 TABLET, FILM COATED ORAL at 07:51

## 2020-10-11 RX ADMIN — GABAPENTIN 100 MG: 100 CAPSULE ORAL at 21:03

## 2020-10-11 RX ADMIN — CEFTRIAXONE 1 G: 1 INJECTION, POWDER, FOR SOLUTION INTRAMUSCULAR; INTRAVENOUS at 16:20

## 2020-10-11 RX ADMIN — Medication 5 MG: at 21:03

## 2020-10-11 RX ADMIN — POLYETHYLENE GLYCOL 3350 17 G: 17 POWDER, FOR SOLUTION ORAL at 07:45

## 2020-10-11 RX ADMIN — SIMVASTATIN 10 MG: 10 TABLET, FILM COATED ORAL at 21:03

## 2020-10-11 RX ADMIN — CETIRIZINE HYDROCHLORIDE 10 MG: 10 TABLET, FILM COATED ORAL at 07:51

## 2020-10-11 RX ADMIN — APIXABAN 5 MG: 5 TABLET, FILM COATED ORAL at 21:03

## 2020-10-11 RX ADMIN — ACETAMINOPHEN 975 MG: 325 TABLET, FILM COATED ORAL at 16:19

## 2020-10-11 RX ADMIN — ACETAMINOPHEN 975 MG: 325 TABLET, FILM COATED ORAL at 07:50

## 2020-10-11 ASSESSMENT — ACTIVITIES OF DAILY LIVING (ADL)
PREVIOUS_RESPONSIBILITIES: MEDICATION MANAGEMENT
ADLS_ACUITY_SCORE: 21
ADLS_ACUITY_SCORE: 23
ADLS_ACUITY_SCORE: 23
ADLS_ACUITY_SCORE: 21
ADLS_ACUITY_SCORE: 23
ADLS_ACUITY_SCORE: 23

## 2020-10-11 NOTE — PLAN OF CARE
Pt to transfer to 3rd floor tele.  Bp stabilized.  Afib controlled.   Wound care done today along with lymph edema wraps. WOC also following.   Pt assist of 3 with gait belt and walker.    Denies pain at rest.   CR 1.89 today.  Kramer in place- will keep at this point due to major skin issues at this point.   Good urine output.   Hgb 7.1-  recheck at noon today per MD.   Report called to 3rd floor nurse

## 2020-10-11 NOTE — PROGRESS NOTES
10/11/20 0900   Quick Adds   Type of Visit Initial Occupational Therapy Evaluation   Living Environment   People in home child(mehreen), adult   Current Living Arrangements   (mobile home)   Home Accessibility no concerns   Transportation Anticipated family or friend will provide   Self-Care   Usual Activity Tolerance fair   Current Activity Tolerance poor   Regular Exercise No   Disability/Function   Hearing Difficulty or Deaf no   General Information   Onset of Illness/Injury or Date of Surgery 10/08/20   Patient/Family Therapy Goal Statement (OT) I don't ever want to have dialysis again.   Additional Occupational Profile Info/Pertinent History of Current Problem Pt  is a 75 year old female who was admitted on 10/8/2020 for acute on chronic kidney disease stage III non-anion gap metabolic acidosis and severe hyperkalemia.  She initially presented with leg swelling, unable to get out of chair and states her legs were too  weak and felt stuck to the floor   Performance Patterns (Routines, Roles, Habits) decreased I with ADl's and functional mobility   Existing Precautions/Restrictions fall   Limitations/Impairments sensory   Heart Disease Risk Factors Overweight   General Observations and Info pt supine in bed, agreeable to OT eval   Cognitive Status Examination   Orientation Status orientation to person, place and time   Affect/Mental Status (Cognitive) anxious;WNL   Follows Commands WNL   Visual Perception   Visual Impairment/Limitations WNL   Sensory   Sensory Comments complains of B hand  tingling at baseline, but worse today   Pain Assessment   Patient Currently in Pain Yes, see Vital Sign flowsheet   Integumentary/Edema   Integumentary/Edema no deficits were identifed   Range of Motion Comprehensive   Comment, General Range of Motion severe arthritis in  her shoulders with decreased AROM 0-10'   Strength Comprehensive (MMT)   Comment, General Manual Muscle Testing (MMT) Assessment elbow to wrist 4/5   Muscle  "Tone Assessment   Muscle Tone Quick Adds No deficits were identified   Coordination   Upper Extremity Coordination Right UE impaired   Coordination Comments more impairment on R due to arthritis   Bed Mobility   Comment (Bed Mobility) mod A x  3   Transfers   Transfer Comments anxious, max  encouragement, min A or  2, 1 for management of lines   Instrumental Activities of Daily Living (IADL)   Previous Responsibilities medication management   Clinical Impression   Criteria for Skilled Therapeutic Interventions Met (OT) yes   OT Diagnosis decreased I with ADL's and functional mobility   OT Problem List-Impairments impacting ADL problems related to;activity tolerance impaired;balance;range of motion (ROM);sensation;strength;pain   ADL comments/analysis decreased I with dressing, bathing, commode transfer, LE dressing   Assessment of Occupational Performance 3-5 Performance Deficits   Identified Performance Deficits decreased dressing, home mgmt, Commode transfer, LE dressing   Planned Therapy Interventions (OT) ADL retraining;ROM;strengthening;transfer training;bed mobility training;balance training   Clinical Decision Making Complexity (OT) moderate complexity   Therapy Frequency (OT) Daily   Predicted Duration of Therapy 1 week   Risks and Benefits of Treatment have been explained. Yes   Patient, Family & other staff in agreement with plan of care Yes   OT Discharge Planning    OT Discharge Recommendation (DC Rec) Transitional Care Facility   OT Rationale for DC Rec pt is  below baseline, is needing A of 3 for  bed  mobility and transfer to chair. not able to complete benito hygiene with toileting or manage LE  clothes.    [facility] [employer] TM \"6 Clicks\"   2016, Trustees of [facility], under license to [employer].  All rights reserved.   6 Clicks Short Forms Daily Activity Inpatient Short Form   [facility]AC  \"6 Clicks\" Daily Activity Inpatient Short Form   1. Putting on and taking off " regular lower body clothing? 1 - Total   2. Bathing (including washing, rinsing, drying)? 1 - Total   3. Toileting, which includes using toilet, bedpan or urinal? 1 - Total   4. Putting on and taking off regular upper body clothing? 2 - A Lot   5. Taking care of personal grooming such as brushing teeth? 2 - A Lot   6. Eating meals? 3 - A Little   Daily Activity Raw Score (Score out of 24.Lower scores equate to lower levels of function) 10   Total Evaluation Time (Minutes)   Total Evaluation Time (Minutes) 10

## 2020-10-11 NOTE — PLAN OF CARE
From ICU during 1100 hour, oriented to room and staff. VSS on RA. Denied pain. . Tele a-fib CVR w/ BBB, denied CP . Per tele tech pt had 21 beats v-tach this afternoon, pt asymptomatic. LS clear, no SOB reported. +3 edema BLEs, cedric per ICU report; bilat leg wraps in place, wound cares to BLEs completed by ICU RN per report. PICC to LUE intact, SL and blood return noted. Subclavian to right side neck intact. Kramer patent w/ good UOP. Mepilex to coccyx and right hip per ICU report. Blister to right greater toe per ICU report, foam in place. Mepilex to RUE intact. Hgb recheck 7.9. Sister at bedside. Possible discharge 1-2 days. Will continue POC.

## 2020-10-11 NOTE — PROGRESS NOTES
Renal Medicine Progress Note            Assessment/Plan:     75 y.o woman with mantle cell lymphoma, diabetes, hypertension and obesity, admitted for leg swelling     Following for kidney injury and severe hyperkalemia.      # Apparent baseline chronic kidney disease.  Limited historical data for review, but most recent creatinine is in the range of approximately 1.0 dated 2017.  Estimated GFR in the range of approximately 60 mL per minute, representing at least stage III chronic kidney disease.       # Acute kidney injury? Improving.      # Hypertension: Admitted with hypotension. BP is soft.      # Severe hyperkalemia due to EVER, acidosis and lisinopril. Resolved with urgent dialysis x 1.      # Acidosis: Lactic acidosis and advanced renal injury: Resolved with dialysis.      # Anemia without thrombocytopenia s/p transfusion.     # Mantle cell lymphoma: On Imbruvica      # Morbid obesity and lymphedema:      Plan:   # Okay to remove temp dialysis CVC  # Renal panel in AM        Interval History:     Afebrile. VSS. Good urine output. SCr is better. She feels better. No N/V. Denies SOB . No abdominal pain.           Medications and Allergies:       acetaminophen  975 mg Oral TID     apixaban ANTICOAGULANT  5 mg Oral BID     cefTRIAXone  1 g Intravenous Q24H     cetirizine  10 mg Oral Daily     fluticasone  1 puff Inhalation Daily     [START ON 10/12/2020] gabapentin  100 mg Oral BID     gabapentin  100 mg Oral At Bedtime     ibrutinib  560 mg Oral At Bedtime     insulin aspart  1-7 Units Subcutaneous TID AC     insulin aspart  1-5 Units Subcutaneous At Bedtime     oxybutynin  5 mg Oral BID     [START ON 10/12/2020] pantoprazole  40 mg Oral QAM AC     simvastatin  10 mg Oral At Bedtime     sodium chloride (PF)  10 mL Intracatheter Q8H        Allergies   Allergen Reactions     Azithromycin Hives     Ciprofloxacin      wheezing     Morphine Hives     Nitrofurantoin      Constipation, wheezing            Physical Exam:  "  Vitals were reviewed   , Blood pressure 111/40, pulse 90, temperature 98  F (36.7  C), temperature source Oral, resp. rate 18, height 1.778 m (5' 10\"), weight 140 kg (308 lb 10.3 oz), SpO2 97 %.    Wt Readings from Last 3 Encounters:   10/10/20 140 kg (308 lb 10.3 oz)       Intake/Output Summary (Last 24 hours) at 10/11/2020 1516  Last data filed at 10/11/2020 1000  Gross per 24 hour   Intake 500 ml   Output 1075 ml   Net -575 ml     GENERAL APPEARANCE: NAD  EYES:  no scleral icterus, pupils equal. OMM.   PULM: Clear anteriorly. No wheezes or crackles. No cyanosis.  CV: Irregular, tachy. No MGR.   GI: obese, soft, NT  NEURO: Awake and oriented x 3. Answering all questions.   EXT: Edematous. Has wraps on.   Access -right IJ temporary catheter placed on 10/9/2020         Data:     CBC RESULTS:     Recent Labs   Lab 10/11/20  1250 10/11/20  0520 10/10/20  0525 10/10/20  0212 10/09/20  2037 10/09/20  1950 10/09/20  1315 10/09/20  0548 10/08/20  2043   WBC  --  7.7 7.5  --   --   --   --  8.1 11.3*   RBC  --  2.60* 2.87*  --   --   --   --  2.22* 2.67*   HGB 7.9* 7.1* 7.9* 7.2*  --  6.5* 5.8* 6.1* 7.2*   HCT  --  24.0* 26.1*  --   --  21.9*  --  21.3* 25.9*   PLT  --  324 334  --  316  --   --  385 431       Basic Metabolic Panel:  Recent Labs   Lab 10/11/20  0520 10/10/20  1730 10/10/20  0525 10/09/20  1315 10/09/20  0548 10/09/20  0332    135 138 137 137 138   POTASSIUM 4.4 5.1 5.0 4.5 6.4* 6.4*   CHLORIDE 105 104 108 105 113* 114*   CO2 24 24 25 24 17* 17*   BUN 52* 49* 50* 45* 83* 83*   CR 1.89* 2.09* 2.22* 2.13* 3.11* 3.25*   * 105* 88 88 111* 145*   LEE 7.2* 7.5* 7.4* 7.3* 8.2* 8.2*       INR  Recent Labs   Lab 10/09/20  2037   INR 1.29*      Attestation:   I have reviewed today's relevant vital signs, notes, medications, labs and imaging.    Sammy Mar MD  [facility] Nephrology  Office phone :[phone]  Pager: [phone]  "

## 2020-10-11 NOTE — PROVIDER NOTIFICATION
"MD paged: \"Per tele tech pt just had 21 beats of v-tach, pt asymptomatic. No callback needed unless necessary.\"  "

## 2020-10-11 NOTE — PROGRESS NOTES
Paged by charge nurse to determine whether patient come out.  Hyperkalemia and hypotension has resolved.  Okay to transfer out to medical telemetry.  Orders entered.

## 2020-10-11 NOTE — PROGRESS NOTES
[facility]    Hospitalist Progress Note  Name: Waleska Marie    MRN: [id number]  Provider: Jillian Anderson MD  Date of Service: 10/11/2020    Assessment & Plan   Summary of Stay: Waleska Marie is a 75 year old female who was admitted on 10/8/2020 for acute on chronic kidney disease stage III non-anion gap metabolic acidosis and severe hyperkalemia.  He initially presented with leg swelling.    His past medical history significant for type 2 diabetes mellitus, atrial fibrillation, stage III chronic kidney disease, severe lymphedema, morbid obesity, COPD, asthma, mantle cell lymphoma.    He received emergent dialysis today for intractable hyperkalemia nonresponsive to several rounds of insulin D50 on admission    Underwent emergent dialysis on 10/9/2020.  Potassium and renal functions improved after dialysis, hemoglobin was down to 5.8 received blood transfusions 1 unit of PRBCs.     10/10/2020: Overnight received another unit of PRBC.  Hemoglobin around 7 will transfuse if less than 7      Hyperkalemia, EVER on at least CKD stage III, NAGMA:   -Renal functions improved creatinine down to 1.89, hyperkalemia resolved  -Presented to outside facility with creatinine 2.75, potassium 7.9, bicarb 12 without elevated anion gap.  Last creatinine on file from 3 years ago was 1.3.   - EKG showed slightly peaked T waves along with LBBB.   -PTA She is on lisinopril, Lasix, metformin, and potassium 20 mEq twice daily at baseline which are all likely to result in  EVER, hyperkalemia, and NAGMA.    -Kramer catheter placed and urine appears dark carlos.  - She was shifted twice with IV insulin/D50 at outside facility and also received 40 mg IV Lasix, 2 L IV normal saline, multiple albuterol nebs, and calcium gluconate 2 g x 2.   - Repeat potassium was 7.4 and she was transferred here.    -On admission at [facility] potassium was 7.5, creatinine up to 3.4, bicarbonate 13.  -Nephrology consulted, evaluated patient at the  bedside this evening, greatly appreciate recommendations.    -Improving her acidosis should improve hyperkalemia.    -Received insulin D50 and sodium bicarbonate push now then start 150meq sodium bicarbonate drip at 150 ml/hr  -Telemetry  -Currently on both aspirin and Eliquis.  This along with her morbid obesity and lymphedema would make dialysis catheter placement quite challenging and likely would need to be done by IR  -Kramer catheter for strict intake and output  -Emergent dialysis 10/9/2020  -Potassium is 4.4 and creatinine 1.89 continues to improve     Hypotension:  Resolved.    -Blood pressure softer in the 80-90 systolic range although it is a forearm cuff measurement.   -she was likely intravascularly dry.  She received 2 L normal saline at outside facility.  Doubt this is from sepsis as she may have a UTI although not certain about this and lactic acid not elevated.  -Received 25 g 25% IV albumin now and then starting bicarbonate infusion at 150 ml/hr  -After dialysis was off bicarb drip and insulin  -Blood pressure improved after transfusions.    -Blood pressure is relatively stable.     Possible UTI:   -Urinalysis grossly abnormal with > 100 WBCs large blood, moderate LE, and positive nitrite.  Slight leukocytosis 11.3.  Afebrile.  UTI in the differential, however no symptoms for this.  Received -ceftriaxone at outside facility.  -Continue ceftriaxone 1 g every 24 hours  -Follow urine culture results     Lymphedema: Severe lymphedema is a chronic issue for the patient.  Resulting in blistering of the skin and weeping.  Has chronic slight erythema to both legs from the feet all the way up to the hip.  Difficult to say if any infection here, she does not it appears different than her baseline.  Takes 40 mg Lasix daily for this.    -TTE from 2017 does not showed , in the past had some reduction in EF of 55-60%, slight elevated right-sided pressures, and diastolic function was indeterminate.  Per the TTE  report, she previously had lower EF.  -Holding diuretics as she appears intravascularly dry as above  -Wound nurse consult  -Having severe pain so ordered oral Dilaudid 2-4 mg every 4 hours as needed  -OT consult for lymphedema    Acute on chronic normocytic anemia:   Hemoglobin previously 8-9 range in 2017.    -Denies any bloody stools or other blood loss.  This is in part due to her CKD.    -drop in hemoglobin from 7.7-5.8  -Received 2 units of PRBCs  -No evidence of acute GI bleed will start on PPI  -Hemoglobin at around 7 possible transfuse if less than 7.  Recheck hemoglobin at noon  -Consider GI consult if further drop in hemoglobin    Morbid obesity: weight is 304 lb, a fair amount of this is fluid.     A. fib, LBBB, HTN: Chronically on Eliquis for anticoagulation.  Also takes metoprolol tartrate 25 mg twice daily and digoxin 125 mcg daily.  Mildly tachycardic here with heart rate 100.  EKG shows LBBB, unclear if new or not.  -Checked digoxin level which is not elevated 1.2 despite the renal dysfunction  -Telemetry  -We will carefully resume Eliquis.  No signs of active bleeding.  -Holding metoprolol due to soft blood pressure     IDDM type II:   -PTA on 35 units Lantus at bedtime along with metformin 500 mg twice daily.  She did not take Lantus the night prior to presentation.  Blood glucose on admission here is in the 90s, she did receive 10 units IV insulin x2 along with D50 at outside facility.  -Was initially treated with insulin drip and IV fluids  -Now on sliding scale insulin.        Mantle cell lymphoma: PTA on imbruvica 140 mg daily for mantle cell lymphoma involving lymph nodes in the neck.    - Resumed PTA dose.     COPD, asthma: Remote history of smoking.  She reports asthma.  PTA on Flovent and albuterol inhaler.  She has chronic shortness of breath, but this is worse the past 5 days.  Chest x-ray showed cardiomegaly, but no infiltrate.  -Resume PTA Flovent, duonebs as needed     Urinary  incontinence: Resumed oxybutynin 5 mg twice daily.     HLD: CK level normal.  Resumed simvastatin 10 mg daily.    Decubiti ulcer  -Wound care consult    Disposition  -Consulted social work.  Patient lives with her son not sure if she has adequate resources  -Consulted palliative care as patient is declining any further dialysis        DVT Prophylaxis: Right admission patient on Eliquis will likely resume once patient is stable for now patient has drop in hemoglobin we will monitor carefully.  Will resume Eliquis if hemoglobin remains stable  Code Status: Full Code    Disposition: Expected discharge at least 2 to 3 days to prior living arrangement will need additional help on discharge.  If hemoglobin remains stable    Interval History   Reviewed chart.  Patient has chronic pain much better managed today.  Denies any chest pain or shortness of breath.  Feels better after transfusion.  Review of other symptoms are negative    -Data reviewed today: I reviewed all new labs and imaging reports over the last 24 hours. I personally reviewed no images or EKG's today.    Physical Exam   Temp: 98.8  F (37.1  C) Temp src: Oral BP: 126/70 Pulse: 93   Resp: 13 SpO2: 100 % O2 Device: None (Room air)    Vitals:    10/08/20 2300 10/10/20 0530   Weight: 138.2 kg (304 lb 10.8 oz) 140 kg (308 lb 10.3 oz)     Vital Signs with Ranges  Temp:  [97.9  F (36.6  C)-98.8  F (37.1  C)] 98.8  F (37.1  C)  Pulse:  [] 93  Resp:  [10-22] 13  BP: ()/(41-70) 126/70  SpO2:  [92 %-100 %] 100 %  I/O last 3 completed shifts:  In: 1175.17 [P.O.:900; I.V.:275.17]  Out: 925 [Urine:925]      Constitutional: Awake, does not appear in distress, chronically ill-appearing  Eyes: sclera white  HEENT: atraumatic, dry mucous membranes  Respiratory: Anterior lung fields clear, no crackles or wheeze  Cardiovascular:  Irregularly irregular tachycardia without murmur  GI: Obese, non-tender, not distended, bowel sounds present  Lymph/Hematologic: no  cervical, axillary, inguinal adenopathy  Genitourinary: Kramer catheter with dark carlos urine in the bag  Skin: Diffuse slight pink erythema to entire legs bilaterally.  Multiple bulla.  Few open bulla without any sign of pus drainage, just clear fluid drainage.  Decubiti noted in back and buttock musculoskeletal/extremities: 3+ lymphedema bilaterally to the hips.  Neurologic: A&Ox3, speech clear, moving all extremities equally  Psychiatric: calm, cooperative     Medications     dextrose       sodium chloride 10 mL/hr at 10/10/20 0855       acetaminophen  975 mg Oral TID     apixaban ANTICOAGULANT  5 mg Oral BID     cefTRIAXone  1 g Intravenous Q24H     cetirizine  10 mg Oral Daily     fluticasone  1 puff Inhalation Daily     gabapentin  100 mg Oral Daily     ibrutinib  560 mg Oral At Bedtime     insulin aspart  1-7 Units Subcutaneous TID AC     insulin aspart  1-5 Units Subcutaneous At Bedtime     oxybutynin  5 mg Oral BID     pantoprazole (PROTONIX) IV  40 mg Intravenous Daily with breakfast     simvastatin  10 mg Oral At Bedtime     sodium chloride (PF)  10 mL Intracatheter Q8H     Data     Recent Labs   Lab 10/09/20  1315 10/09/20  0332 10/09/20  0051   PHV 7.42 7.23* 7.20*   PO2V 31 23* 20*   PCO2V 39* 41 40   HCO3V 25 17* 16*     Recent Labs   Lab 10/11/20  0520 10/10/20  0525 10/10/20  0212 10/09/20  2037 10/09/20  1950 10/09/20  0548 10/09/20  0548   WBC 7.7 7.5  --   --   --   --  8.1   HGB 7.1* 7.9* 7.2*  --  6.5*   < > 6.1*   HCT 24.0* 26.1*  --   --  21.9*  --  21.3*   MCV 92 91  --   --   --   --  96    334  --  316  --   --  385    < > = values in this interval not displayed.     Recent Labs   Lab 10/11/20  0520 10/10/20  1730 10/10/20  0525    135 138   POTASSIUM 4.4 5.1 5.0   CHLORIDE 105 104 108   CO2 24 24 25   ANIONGAP 6 7 5   * 105* 88   BUN 52* 49* 50*   CR 1.89* 2.09* 2.22*   GFRESTIMATED 26* 23* 21*   GFRESTBLACK 30* 26* 24*   LEE 7.2* 7.5* 7.4*     No results for input(s):  CULT in the last 168 hours.  No results for input(s): NTBNPI, NTBNP in the last 168 hours.  Recent Labs   Lab 10/11/20  0520 10/10/20  1730 10/10/20  0525   CR 1.89* 2.09* 2.22*     No results for input(s): SED, CRP in the last 168 hours.  GFR Estimate   Date Value Ref Range Status   10/11/2020 26 (L) >60 mL/min/[1.73_m2] Final     Comment:     Non  GFR Calc  Starting 12/18/2018, serum creatinine based estimated GFR (eGFR) will be   calculated using the Chronic Kidney Disease Epidemiology Collaboration   (CKD-EPI) equation.     10/10/2020 23 (L) >60 mL/min/[1.73_m2] Final     Comment:     Non  GFR Calc  Starting 12/18/2018, serum creatinine based estimated GFR (eGFR) will be   calculated using the Chronic Kidney Disease Epidemiology Collaboration   (CKD-EPI) equation.     10/10/2020 21 (L) >60 mL/min/[1.73_m2] Final     Comment:     Non  GFR Calc  Starting 12/18/2018, serum creatinine based estimated GFR (eGFR) will be   calculated using the Chronic Kidney Disease Epidemiology Collaboration   (CKD-EPI) equation.       GFR Estimate If Black   Date Value Ref Range Status   10/11/2020 30 (L) >60 mL/min/[1.73_m2] Final     Comment:      GFR Calc  Starting 12/18/2018, serum creatinine based estimated GFR (eGFR) will be   calculated using the Chronic Kidney Disease Epidemiology Collaboration   (CKD-EPI) equation.     10/10/2020 26 (L) >60 mL/min/[1.73_m2] Final     Comment:      GFR Calc  Starting 12/18/2018, serum creatinine based estimated GFR (eGFR) will be   calculated using the Chronic Kidney Disease Epidemiology Collaboration   (CKD-EPI) equation.     10/10/2020 24 (L) >60 mL/min/[1.73_m2] Final     Comment:      GFR Calc  Starting 12/18/2018, serum creatinine based estimated GFR (eGFR) will be   calculated using the Chronic Kidney Disease Epidemiology Collaboration   (CKD-EPI) equation.       Recent Labs   Lab  10/11/20  0730 10/11/20  0520 10/11/20  0036 10/10/20  1949 10/10/20  1730 10/10/20  1547 10/10/20  1138 10/10/20  0525 10/10/20  0525 10/09/20  1315 10/09/20  1315 10/09/20  0548 10/09/20  0548   GLC  --  105*  --   --  105*  --   --   --  88  --  88  --  111*   BGM 99  --  116* 160*  --  113* 94   < >  --    < >  --    < >  --     < > = values in this interval not displayed.     Recent Labs   Lab 10/11/20  0520 10/10/20  0525 10/10/20  0212   HGB 7.1* 7.9* 7.2*     Recent Labs   Lab 10/08/20  2043   AST 12   ALT 13   ALKPHOS 97   BILITOTAL 0.3     Recent Labs   Lab 10/09/20  2037   INR 1.29*     Recent Labs   Lab 10/09/20  1950 10/08/20  2043   LACT 0.5* 0.9     No results for input(s): LIPASE in the last 168 hours.  Recent Labs   Lab 10/11/20  0520 10/10/20  1730 10/10/20  0525   BUN 52* 49* 50*   CR 1.89* 2.09* 2.22*     No results for input(s): TSH in the last 168 hours.  Recent Labs   Lab 10/08/20  2043   TROPI <0.015     Recent Labs   Lab 10/09/20  1404   COLOR Yellow   APPEARANCE Slightly Cloudy   URINEGLC Negative   URINEBILI Negative   URINEKETONE Negative   SG 1.024   UBLD Moderate*   URINEPH 5.5   PROTEIN 70*   NITRITE Negative   LEUKEST Large*   RBCU 12*   WBCU 52*       No results found for this or any previous visit (from the past 24 hour(s)).

## 2020-10-11 NOTE — PROGRESS NOTES
[facility]  Palliative Care Progress Note  Text Page     Assessment & Plan   Waleska Marie is a 75 year old female who was admitted on 10/8/2020. I was asked to see the patient for goals of care in the setting of critical illness.     Recommendations:   Please see assessments below for rationale.  1.Decisional Capacity -  Intact. Patient does not have an advance directive. Per  informed consent policy next of kin should be involved if patient becomes unable. Son Bharat is NOK  2. Pain- bilateral leg pain  Patient's opioid use in past 24 hours: 6 mg PO hydromorphone, Hydromorphone IV 0.4 mg = 32 mg Daily Morphine Equivalent  -increased gabapentin to 100 mg BID  -Acetaminophen 975 mg 3 times daily for on going pain control  -Hydromorphone PO 2-4 mg every 4 hours as needed for pain  -Hydromorphone IV 0.4-0.6 mg every 2 hours as needed for pain  3. Generalized weakness  -therapies as able  4. Spiritual Care- Oriented to Spiritual Health as part of Palliative Care team. Consultation placed for  to follow.  5. Care Planning- Appreciate Care of [facility] for discharge planning as able.  -Discuss advance health care directive at next visit.      Goals of Care: (POLST verbage) code status Full Code-reconfirmed with patient these are her wishes. She will also have dialysis again if it is a life or death situation  Findings & plan of care discussed with:  Hospitalist Dr Anderson  Thank you for involving us in the patient's care.      Palliative Care Assessment:  Waleska Marie is a 75 year old female with a past medical history of DM2, morbid obesity, severe lymphedema, CKD, COPD asthma, LHD, mantle cell lymphoma, anemia, atrial fibrilation on eliquis.  who presents with bilateral legs pain and weeping sores. Per family she has a caregiver that helps with cares as patient allows. Per son she is seen at the wound clinic weekly otherwise no wound care daily per his  "knowledge they were healed. Patient reports that she wants all treatments including CPR and Intubation but wants another option instead of dialysis as it was too painful. She reports 8-10/10 pain in her legs all the time without much relief. Discussed that if her pain was controlled and she needed dialysis would she do it, she will consider it. Son Bharat wants her to have dialysis even if she says no however she is making her own decisions at this time.     Corina FELDMAN CNP  Pain Management and Palliative Care  [facility]  Pgr: [phone]    Time Spent on this Encounter   Total unit/floor time 25 minutes, time consisted of the following, examination of the patient, reviewing the record and completing documentation. >50% of time spent in counseling and coordination of care.  Time spend counseling with patient, family and medical team consisted of the following topics, symptom management.  Time spent in coordination of care with those listed above.     Interval History    patient is more alert today. She stated her pain yesterday was better and today is having increased pain in her legs and hands. It is burning and shooting pain.     Course of Hospitalization Discussions Data   Decision-Making & Goals of Care Discussion:  Discussed on October 11, 2020 with GASTON Agudelo CNP: met with patient and her sister in the room. Reviewed hospital course, current medications, symptoms and goals of care. She stated \"if it is life or death I will do dialysis but only if I will not live without it. That is the worst pain I have ever been in my life.\" reviewed that she has a right to make that decision and without dialysis her life expectancy would have been guarded. She stated \"my son thinks he can make me do everything but he can't\". Discussed that should she become unable to make decisions he does have the right to make decisions but as long as she can make decisions it is her right to refuse " "or except cares. She verbalized understanding. Discussed pain management and patient stated it is improving but does come and go. Discussed the increase in gabapentin for neuropathic pain and she was open to this. Questions asked and answered.     Discussed on October 9, 2020 with Corina FELDMAN CNP: Met with patient in her room. Introduced self and the palliative care team role. Reviewed current hospitalization and current condition. Reviewed code status with patient and she is full code and stated this is her continued wish. She did then say she does not want dialysis, explained that without dialysis and her labs get severe again her heart will likely stop and she will need CPR which will include further pain from chest compression and likely broken ribs. She verbalized understanding and said \"I am not not ready to give up but the pain was miriam severe with dialysis that I do not want to go through that again. Discussed pain medications and getting her pain better controlled prior to and during dialysis and she said she will consider this. For now she is requesting full code and treatments. Discussed with son Bharat who wants her to get dialysis. Did say that she has the right to not have it should she choose. He said if she refuses dialysis her sister should come talk with her about her options in person. Bharat expressed that she is not often wanting to do things that are uncomfortable. Questions asked and answered.        Review of Systems    CONSTITUTIONAL: NEGATIVE for fever, chills, change in weight  ENT/MOUTH: NEGATIVE for ear, mouth and throat problems  RESP: NEGATIVE for significant cough or SOB  CV: NEGATIVE for chest pain, palpitations    Palliative Symptom Review (0=no symptom/no concern, 1=mild, 2=moderate, 3=severe):      Pain: 1-mild      Fatigue: 1-mild      Nausea: 0-none      Constipation: 0-none      Diarrhea: 0-none      Depressive Symptoms: 0-none      Anxiety: 0-none      Drowsiness: " 0-none      Poor Appetite: 1-mild      Shortness of Breath: 0-none      Insomnia: 1-mild        Overall (0 good/no concerns, 3 very poor):  2    Physical Exam   Temp:  [97.9  F (36.6  C)-98.8  F (37.1  C)] 98.8  F (37.1  C)  Pulse:  [] 101  Resp:  [10-23] 23  BP: ()/(41-70) 135/55  SpO2:  [92 %-100 %] 97 %  308 lbs 10.3 oz  Exam:  GENERAL APPEARANCE:  Alert, cooperative  RESP:  respiratory effort and palpation of chest normal, lungs clear to auscultation , no respiratory distress  CV:  Palpation and auscultation of heart done , regular rate and rhythm, no murmur, rub, or gallop, peripheral edema 2+ in bilateral lower extremities  ABDOMEN:  normal bowel sounds, soft, nontender, no hepatosplenomegaly or other masses  PSYCH:  oriented X 3, affect and mood normal    Medications     dextrose       sodium chloride 10 mL/hr at 10/10/20 0855       acetaminophen  975 mg Oral TID     apixaban ANTICOAGULANT  5 mg Oral BID     cefTRIAXone  1 g Intravenous Q24H     cetirizine  10 mg Oral Daily     fluticasone  1 puff Inhalation Daily     gabapentin  100 mg Oral Daily     ibrutinib  560 mg Oral At Bedtime     insulin aspart  1-7 Units Subcutaneous TID AC     insulin aspart  1-5 Units Subcutaneous At Bedtime     oxybutynin  5 mg Oral BID     pantoprazole (PROTONIX) IV  40 mg Intravenous Daily with breakfast     simvastatin  10 mg Oral At Bedtime     sodium chloride (PF)  10 mL Intracatheter Q8H       Data   Results for orders placed or performed during the hospital encounter of 10/08/20 (from the past 24 hour(s))   Glucose by meter   Result Value Ref Range    Glucose 94 70 - 99 mg/dL   Glucose by meter   Result Value Ref Range    Glucose 113 (H) 70 - 99 mg/dL   Renal panel   Result Value Ref Range    Sodium 135 133 - 144 mmol/L    Potassium 5.1 3.4 - 5.3 mmol/L    Chloride 104 94 - 109 mmol/L    Carbon Dioxide 24 20 - 32 mmol/L    Anion Gap 7 3 - 14 mmol/L    Glucose 105 (H) 70 - 99 mg/dL    Urea Nitrogen 49 (H) 7 - 30  mg/dL    Creatinine 2.09 (H) 0.52 - 1.04 mg/dL    GFR Estimate 23 (L) >60 mL/min/[1.73_m2]    GFR Estimate If Black 26 (L) >60 mL/min/[1.73_m2]    Calcium 7.5 (L) 8.5 - 10.1 mg/dL    Phosphorus 3.9 2.5 - 4.5 mg/dL    Albumin 2.3 (L) 3.4 - 5.0 g/dL   Glucose by meter   Result Value Ref Range    Glucose 160 (H) 70 - 99 mg/dL   Glucose by meter   Result Value Ref Range    Glucose 116 (H) 70 - 99 mg/dL   CBC with platelets differential   Result Value Ref Range    WBC 7.7 4.0 - 11.0 10e9/L    RBC Count 2.60 (L) 3.8 - 5.2 10e12/L    Hemoglobin 7.1 (L) 11.7 - 15.7 g/dL    Hematocrit 24.0 (L) 35.0 - 47.0 %    MCV 92 78 - 100 fl    MCH 27.3 26.5 - 33.0 pg    MCHC 29.6 (L) 31.5 - 36.5 g/dL    RDW 15.8 (H) 10.0 - 15.0 %    Platelet Count 324 150 - 450 10e9/L    Diff Method Automated Method     % Neutrophils 64.3 %    % Lymphocytes 19.1 %    % Monocytes 13.4 %    % Eosinophils 1.4 %    % Basophils 0.8 %    % Immature Granulocytes 1.0 %    Nucleated RBCs 0 0 /100    Absolute Neutrophil 5.0 1.6 - 8.3 10e9/L    Absolute Lymphocytes 1.5 0.8 - 5.3 10e9/L    Absolute Monocytes 1.0 0.0 - 1.3 10e9/L    Absolute Eosinophils 0.1 0.0 - 0.7 10e9/L    Absolute Basophils 0.1 0.0 - 0.2 10e9/L    Abs Immature Granulocytes 0.1 0 - 0.4 10e9/L    Absolute Nucleated RBC 0.0    Basic metabolic panel   Result Value Ref Range    Sodium 135 133 - 144 mmol/L    Potassium 4.4 3.4 - 5.3 mmol/L    Chloride 105 94 - 109 mmol/L    Carbon Dioxide 24 20 - 32 mmol/L    Anion Gap 6 3 - 14 mmol/L    Glucose 105 (H) 70 - 99 mg/dL    Urea Nitrogen 52 (H) 7 - 30 mg/dL    Creatinine 1.89 (H) 0.52 - 1.04 mg/dL    GFR Estimate 26 (L) >60 mL/min/[1.73_m2]    GFR Estimate If Black 30 (L) >60 mL/min/[1.73_m2]    Calcium 7.2 (L) 8.5 - 10.1 mg/dL   Glucose by meter   Result Value Ref Range    Glucose 99 70 - 99 mg/dL

## 2020-10-12 ENCOUNTER — APPOINTMENT (OUTPATIENT)
Dept: PHYSICAL THERAPY | Facility: CLINIC | Age: 75
DRG: 682 | End: 2020-10-12
Attending: INTERNAL MEDICINE
Payer: MEDICARE

## 2020-10-12 ENCOUNTER — APPOINTMENT (OUTPATIENT)
Dept: OCCUPATIONAL THERAPY | Facility: CLINIC | Age: 75
DRG: 682 | End: 2020-10-12
Attending: INTERNAL MEDICINE
Payer: MEDICARE

## 2020-10-12 LAB
ANION GAP SERPL CALCULATED.3IONS-SCNC: 4 MMOL/L (ref 3–14)
BUN SERPL-MCNC: 39 MG/DL (ref 7–30)
CALCIUM SERPL-MCNC: 8 MG/DL (ref 8.5–10.1)
CHLORIDE SERPL-SCNC: 106 MMOL/L (ref 94–109)
CO2 SERPL-SCNC: 26 MMOL/L (ref 20–32)
CREAT SERPL-MCNC: 1.31 MG/DL (ref 0.52–1.04)
ERYTHROCYTE [DISTWIDTH] IN BLOOD BY AUTOMATED COUNT: 15.3 % (ref 10–15)
GFR SERPL CREATININE-BSD FRML MDRD: 40 ML/MIN/{1.73_M2}
GLUCOSE BLDC GLUCOMTR-MCNC: 129 MG/DL (ref 70–99)
GLUCOSE BLDC GLUCOMTR-MCNC: 132 MG/DL (ref 70–99)
GLUCOSE BLDC GLUCOMTR-MCNC: 143 MG/DL (ref 70–99)
GLUCOSE BLDC GLUCOMTR-MCNC: 162 MG/DL (ref 70–99)
GLUCOSE BLDC GLUCOMTR-MCNC: 99 MG/DL (ref 70–99)
GLUCOSE SERPL-MCNC: 99 MG/DL (ref 70–99)
HCT VFR BLD AUTO: 26.5 % (ref 35–47)
HGB BLD-MCNC: 7.6 G/DL (ref 11.7–15.7)
IGA SERPL-MCNC: 241 MG/DL (ref 84–499)
IGG SERPL-MCNC: 912 MG/DL (ref 610–1616)
IGM SERPL-MCNC: 37 MG/DL (ref 35–242)
KAPPA LC UR-MCNC: 7.46 MG/DL (ref 0.33–1.94)
KAPPA LC/LAMBDA SER: 1.08 {RATIO} (ref 0.26–1.65)
LAMBDA LC SERPL-MCNC: 6.89 MG/DL (ref 0.57–2.63)
MAGNESIUM SERPL-MCNC: 1.8 MG/DL (ref 1.6–2.3)
MCH RBC QN AUTO: 27 PG (ref 26.5–33)
MCHC RBC AUTO-ENTMCNC: 28.7 G/DL (ref 31.5–36.5)
MCV RBC AUTO: 94 FL (ref 78–100)
PLATELET # BLD AUTO: 322 10E9/L (ref 150–450)
POTASSIUM SERPL-SCNC: 4.4 MMOL/L (ref 3.4–5.3)
PROT ELPH PNL UR ELPH: NORMAL
PROT PATTERN SERPL IFE-IMP: NORMAL
RBC # BLD AUTO: 2.82 10E12/L (ref 3.8–5.2)
SODIUM SERPL-SCNC: 136 MMOL/L (ref 133–144)
WBC # BLD AUTO: 6.9 10E9/L (ref 4–11)

## 2020-10-12 PROCEDURE — 250N000013 HC RX MED GY IP 250 OP 250 PS 637: Performed by: HOSPITALIST

## 2020-10-12 PROCEDURE — 999N001017 HC STATISTIC GLUCOSE BY METER IP

## 2020-10-12 PROCEDURE — 250N000013 HC RX MED GY IP 250 OP 250 PS 637: Performed by: NURSE PRACTITIONER

## 2020-10-12 PROCEDURE — 83735 ASSAY OF MAGNESIUM: CPT | Performed by: HOSPITALIST

## 2020-10-12 PROCEDURE — 250N000011 HC RX IP 250 OP 636: Performed by: INTERNAL MEDICINE

## 2020-10-12 PROCEDURE — 120N000001 HC R&B MED SURG/OB

## 2020-10-12 PROCEDURE — 97140 MANUAL THERAPY 1/> REGIONS: CPT | Mod: GP

## 2020-10-12 PROCEDURE — 999N000040 HC STATISTIC CONSULT NO CHARGE VASC ACCESS

## 2020-10-12 PROCEDURE — 250N000013 HC RX MED GY IP 250 OP 250 PS 637: Performed by: INTERNAL MEDICINE

## 2020-10-12 PROCEDURE — 80048 BASIC METABOLIC PNL TOTAL CA: CPT | Performed by: HOSPITALIST

## 2020-10-12 PROCEDURE — 97110 THERAPEUTIC EXERCISES: CPT | Mod: GO

## 2020-10-12 PROCEDURE — 85027 COMPLETE CBC AUTOMATED: CPT | Performed by: HOSPITALIST

## 2020-10-12 PROCEDURE — 999N000157 HC STATISTIC RCP TIME EA 10 MIN

## 2020-10-12 PROCEDURE — 94640 AIRWAY INHALATION TREATMENT: CPT

## 2020-10-12 PROCEDURE — 99233 SBSQ HOSP IP/OBS HIGH 50: CPT | Performed by: HOSPITALIST

## 2020-10-12 RX ORDER — ASPIRIN 81 MG/1
81 TABLET ORAL DAILY
Status: DISCONTINUED | OUTPATIENT
Start: 2020-10-12 | End: 2020-10-21 | Stop reason: HOSPADM

## 2020-10-12 RX ORDER — DIGOXIN 125 MCG
125 TABLET ORAL DAILY
Status: DISCONTINUED | OUTPATIENT
Start: 2020-10-12 | End: 2020-10-21 | Stop reason: HOSPADM

## 2020-10-12 RX ADMIN — SIMVASTATIN 10 MG: 10 TABLET, FILM COATED ORAL at 21:48

## 2020-10-12 RX ADMIN — APIXABAN 5 MG: 5 TABLET, FILM COATED ORAL at 08:11

## 2020-10-12 RX ADMIN — FLUTICASONE FUROATE 1 PUFF: 200 POWDER RESPIRATORY (INHALATION) at 08:22

## 2020-10-12 RX ADMIN — ACETAMINOPHEN 975 MG: 325 TABLET, FILM COATED ORAL at 15:57

## 2020-10-12 RX ADMIN — CETIRIZINE HYDROCHLORIDE 10 MG: 10 TABLET, FILM COATED ORAL at 08:11

## 2020-10-12 RX ADMIN — Medication 5 MG: at 21:48

## 2020-10-12 RX ADMIN — GABAPENTIN 100 MG: 100 CAPSULE ORAL at 21:48

## 2020-10-12 RX ADMIN — ASPIRIN 81 MG: 81 TABLET, COATED ORAL at 14:10

## 2020-10-12 RX ADMIN — GABAPENTIN 100 MG: 100 CAPSULE ORAL at 08:11

## 2020-10-12 RX ADMIN — DIGOXIN 125 MCG: 125 TABLET ORAL at 14:10

## 2020-10-12 RX ADMIN — HYDROMORPHONE HYDROCHLORIDE 2 MG: 2 TABLET ORAL at 02:01

## 2020-10-12 RX ADMIN — Medication 5 MG: at 08:16

## 2020-10-12 RX ADMIN — APIXABAN 5 MG: 5 TABLET, FILM COATED ORAL at 21:48

## 2020-10-12 RX ADMIN — ACETAMINOPHEN 975 MG: 325 TABLET, FILM COATED ORAL at 08:11

## 2020-10-12 RX ADMIN — PANTOPRAZOLE SODIUM 40 MG: 40 TABLET, DELAYED RELEASE ORAL at 06:33

## 2020-10-12 RX ADMIN — CEFTRIAXONE 1 G: 1 INJECTION, POWDER, FOR SOLUTION INTRAMUSCULAR; INTRAVENOUS at 15:57

## 2020-10-12 RX ADMIN — ACETAMINOPHEN 975 MG: 325 TABLET, FILM COATED ORAL at 21:48

## 2020-10-12 ASSESSMENT — ACTIVITIES OF DAILY LIVING (ADL)
ADLS_ACUITY_SCORE: 23

## 2020-10-12 NOTE — PROVIDER NOTIFICATION
Pt here for EVER has received 2 units PRBC since admission. No AM labs ordered. Would you like a CBC and BMP ordered? Thank you!    Labs ordered.

## 2020-10-12 NOTE — PLAN OF CARE
Vitals: VSS. Afebrile. C/o pain to left leg, oral dilaudid given x1.   Labs:   Neuro: A&O x4. Calm, cooperative.  Respiratory: Lung sounds clear. Reports a productive cough.   Cardiac/Telemetry: Afib CVR BBB. Denies chest pain. Generalized edema present. Lymphedema wraps in place.   GI/: Kramer in place, patent.   Skin: Multiple skin issues. WOC following.   LDAs: PICC to left arm. Hemodialysis access to subclavian vein.   Diet: Renal diet  Activity: A3+, lift  Teaching: POC reviewed with pt. Questions asked and answered.   Plan: Monitor hgb and creatinine. SW following for discharge planning. Continue with POC.

## 2020-10-12 NOTE — CONSULTS
"NUTRITION ASSESSMENT    REASON FOR ASSESSMENT:  Positive nutrition risk screen - stageable pressure injuries or large non-healing wounds  History of IDDM type II, morbid obesity, severe lymphedema, CKD stage III, COPD, asthma, HLD, mantle cell lymphoma, anemia, and A. fib on Eliquis   CURRENT DIET AND NOURISHMENT ORDER:  Information obtained from chart review (with provider, nursing during attempts to see)  Food allergies/intolerances: NKFA    Diet: Renal (non-dialysis)  Current Intake/Tolerance: Per flow sheet review, % intake for documented meals.    ANTHROPOMETRICS  Height: 5' 10\"  Weight: 139 kg   Body mass index is 44.29 kg/m .  Weight Status:  Obesity Grade III BMI >40  Weight History: dry weight masked by edema  Wt Readings from Last 10 Encounters:   10/10/20 140 kg (308 lb 10.3 oz)       ASSESSED NUTRITION NEEDS (PER APPROVED PRACTICE GUIDELINES, Dosing weight: 68.2 kg)  Estimated Energy Needs: 0984-5854 kcals (25-30 Kcal/Kg)  Justification: maintenance and obese  Estimated Protein Needs:  grams protein (1-1.5 g pro/Kg)  Justification: CKD and wound healing  Estimated Fluid Needs: per MD    LABS/MEDS/PHYSICAL FINDINGS:  Lymphedema, +2-3 BLE edema, L/R arm edema  HD x 1 during admission  WOCN 10/9: LEwounds due to Lymphedema, superficial wounds to bilateral buttocks  Meds reviewed  Labs reviewed    Electrolytes  Potassium (mmol/L)   Date Value   10/12/2020 4.4   10/11/2020 4.4   10/10/2020 5.1     Phosphorus (mg/dL)   Date Value   10/10/2020 3.9   10/09/2020 3.3   10/09/2020 5.5 (H)   10/08/2020 5.7 (H)    Blood Glucose  Glucose (mg/dL)   Date Value   10/12/2020 99   10/11/2020 105 (H)   10/10/2020 105 (H)   10/10/2020 88   10/09/2020 88     Hemoglobin A1C (%)   Date Value   10/08/2020 Canceled, Test credited    Inflammatory Markers  WBC (10e9/L)   Date Value   10/12/2020 6.9   10/11/2020 7.7   10/10/2020 7.5     Albumin (g/dL)   Date Value   10/10/2020 2.3 (L)   10/09/2020 2.1 (L)   10/08/2020 " 2.5 (L)      Magnesium (mg/dL)   Date Value   10/12/2020 1.8   10/09/2020 2.0   10/08/2020 2.2     Sodium (mmol/L)   Date Value   10/12/2020 136   10/11/2020 135   10/10/2020 135    Renal  Urea Nitrogen (mg/dL)   Date Value   10/12/2020 39 (H)   10/11/2020 52 (H)   10/10/2020 49 (H)     Creatinine (mg/dL)   Date Value   10/12/2020 1.31 (H)   10/11/2020 1.89 (H)   10/10/2020 2.09 (H)     Additional  Ketones Urine (mg/dL)   Date Value   10/09/2020 Negative           Malnutrition:  Unable to determine if patient meets two of the following criteria necessary for diagnosing malnutrition due to lack of nutrition focused physical exam: significant weight loss, reduced intake, subcutaneous fat loss, muscle loss or fluid retention    INTERVENTION:  Nutrition Diagnosis:  No nutrition diagnosis at this time.    Implementation:  Nutrition Education: Per MD order and/or as patient availability allows   Collaboration and Referral of care: Discussed patient during interdisciplinary care rounds this morning    Follow Up/Monitoring:   Progress towards goals will be monitored and evaluated per protocol and Practice Guidelines        Izzy Sandy, MS, RDN, LD, CNSC  Pager - 3rd floor/ICU: [phone]  Pager - All other floors: [phone]  Pager - Weekend/holiday: [phone]  Office: [phone]

## 2020-10-12 NOTE — PROGRESS NOTES
[facility]    Hospitalist Progress Note  Name: Waleska Marie    MRN: [id number]  Provider:  Carrillo Cline DO, MPH  Date of Service: 10/12/2020    Summary of Stay: Waleska Marie is a 75 year old female with a history of stage III CKD, type 2 diabetes mellitus, atrial fibrillation, severe lymphedema and morbid obesity, COPD, asthma, and mantle cell lymphoma admitted on 10/8/2020 with non-anion gap metabolic acidosis, acute kidney injury, and severe hyperkalemia refractory to medications.  Presented to an outside facility with creatinine of 2.75, potassium 7.9, and bicarb 12 with elevated anion gap.  Most recent baseline renal function shows creatinine of 1.3.  Prior to admission on lisinopril, Lasix, and metformin as well as potassium supplementation 20 mEq twice daily.  Treated with insulin, D50, IV Lasix, normal saline, albuterol nebulizer, and calcium gluconate with no significant improvement in potassium.  Transferred to our facility and nephrology consulted.  Received emergent dialysis on 10/9 with temporary dialysis catheter placed.  Only received 1 dialysis session with subsequent improvement in hyperkalemia and renal function and acidosis.  Also found to have acute on chronic anemia with hemoglobin dropped to 5.8 recovering with 2 unit of packed red blood cells.    Problem List:   1. Acute kidney injury, anion gap metabolic acidosis, and severe hyperkalemia: Etiology likely related to prior to admission lisinopril and potassium supplements.  Nephrology consulted and renal function recovered and resolved hyperkalemia with emergent dialysis.  IR consultation for temporary dialysis catheter removal today.  We will continue to hold her prior to admission lisinopril, Lasix, metformin, and potassium supplement.  2. Possible urinary tract infection: Grossly abnormal urinalysis.  Continue IV ceftriaxone and follow-up urine culture.  3. Obesity and severe lymphedema: At baseline ambulates  with a walker.  Has significant edema up to the hips.  Not appropriate at this time for continuation of her prior to admission Lasix.  Lymphedema consult, PT/OT.  4. Acute on chronic normocytic anemia: Baseline hemoglobin about 8-9 from 2017.  Drop in hemoglobin to 5.8 and recovered with 2 units of packed red blood cells.  Continue empiric PPI but no evidence of active bleeding.  Hold off on GI consultation, recheck hemoglobin in the morning.  5. Atrial fibrillation: Chronically on Eliquis.  We will continue for now with relatively stable hemoglobin.  Continue digoxin, level 1.2.  Blood pressure somewhat soft so holding prior to admission metoprolol for the time being.  6. Hypertension: As above, blood pressure slightly soft.  Holding prior to admission metoprolol and lisinopril.  7. Mantle cell lymphoma: Continuing Imbruvica.  8. Type 2 diabetes mellitus: Prior to admission on Lantus 35 units at bedtime with metformin 500 mg twice daily and Actos.  Was initially treated with an insulin drip and IV fluids.  Continue sliding scale insulin.  Holding meal coverage as well as prior to admission Lantus.  Blood glucose well controlled.    DVT Prophylaxis: Eliquis.  Code Status: Full Code  Diet: Room Service  Renal Diet (non-dialysis)    Kramer Catheter: in place, indication: Strict 1-2 Hour I&O  Disposition: Expected discharge in 1-2 days to home vs TCU. Goals prior to discharge include rehab, monitor labs.   Incidental Findings: None.  Family updated today: No     Interval History   Assumed care from previous hospitalist. The history was fully reviewed.  The patient reports doing well. No chest pain or shortness of breath. No nausea, vomiting, diarrhea, constipation. No fevers. No other specific complaints identified.     -Data reviewed today: I personally reviewed all new labs and imaging results over the last 24 hours.     Physical Exam   Temp: 98.5  F (36.9  C) Temp src: Axillary BP: 109/46 Pulse: 86   Resp: 18 SpO2:  98 % O2 Device: None (Room air)    Vitals:    10/08/20 2300 10/10/20 0530   Weight: 138.2 kg (304 lb 10.8 oz) 140 kg (308 lb 10.3 oz)     Vital Signs with Ranges  Temp:  [97.7  F (36.5  C)-98.5  F (36.9  C)] 98.5  F (36.9  C)  Pulse:  [81-92] 86  Resp:  [18-20] 18  BP: (103-114)/(31-47) 109/46  SpO2:  [95 %-98 %] 98 %  I/O last 3 completed shifts:  In: 590 [P.O.:590]  Out: 2550 [Urine:2550]    GENERAL: No apparent distress. Awake, alert, and fully oriented.  HEENT: Normocephalic, atraumatic. Extraocular movements intact.  CARDIOVASCULAR: Regular rate and rhythm without murmurs or rubs. No S3.  PULMONARY: Clear bilaterally.  GASTROINTESTINAL: Soft, non-tender, non-distended. Bowel sounds normoactive.   EXTREMITIES: No cyanosis or clubbing. Massive lymphedema.  NEUROLOGICAL: CN 2-12 grossly intact, no focal neurological deficits.  DERMATOLOGICAL: No rash, ulcer, bruising, nor jaundice.     Medications     dextrose       sodium chloride 10 mL/hr at 10/10/20 0855       acetaminophen  975 mg Oral TID     apixaban ANTICOAGULANT  5 mg Oral BID     cefTRIAXone  1 g Intravenous Q24H     cetirizine  10 mg Oral Daily     fluticasone  1 puff Inhalation Daily     gabapentin  100 mg Oral BID     gabapentin  100 mg Oral At Bedtime     ibrutinib  560 mg Oral At Bedtime     insulin aspart  1-7 Units Subcutaneous TID AC     insulin aspart  1-5 Units Subcutaneous At Bedtime     oxybutynin  5 mg Oral BID     pantoprazole  40 mg Oral QAM AC     simvastatin  10 mg Oral At Bedtime     sodium chloride (PF)  10 mL Intracatheter Q8H     Data     Laboratory:  Recent Labs   Lab 10/12/20  0641 10/11/20  1250 10/11/20  0520 10/10/20  0525   WBC 6.9  --  7.7 7.5   HGB 7.6* 7.9* 7.1* 7.9*   HCT 26.5*  --  24.0* 26.1*   MCV 94  --  92 91     --  324 334     Recent Labs   Lab 10/12/20  0641 10/11/20  0520 10/10/20  1730    135 135   POTASSIUM 4.4 4.4 5.1   CHLORIDE 106 105 104   CO2 26 24 24   ANIONGAP 4 6 7   GLC 99 105* 105*   BUN 39*  52* 49*   CR 1.31* 1.89* 2.09*   GFRESTIMATED 40* 26* 23*   GFRESTBLACK 46* 30* 26*   LEE 8.0* 7.2* 7.5*     No results for input(s): CULT in the last 168 hours.    Imaging:  No results found for this or any previous visit (from the past 24 hour(s)).      Carrillo Cline DO MPH  [facility]  [address] [place] [address]  Pager: [phone]  10/12/2020

## 2020-10-12 NOTE — PLAN OF CARE
Vss, no co cp/sob, pain to feet controlled with scheduled tylenol.   Tele AFIB CVR BBB, dc'd this shift.  Kramer with adequate output, PCDs off d/t lymph wraps on, all drsg changes done this am with a second drsg change done to the coccyx due to excessive bleeding during the first change (applied pressure and waited to reapply drsg, pt is on eliquis).  BG 99, 162.  Obese, on baltazar-bed air mattress, turn and reposition every two hours as pt will allow, mostly side to side with cares and back to back per pt preference, requires max assist of 3+lift with turning/cares.  Order to have RCVC HD port removed today but unsure if the pt will be able to get on the schedule, it may need to be moved to tomorrow, pt has been updated.  LPICC WDL, mag 1.8, K+ 4.4, Cr 1.3.1, hgb 7.6.  PT/OT/WOC following.  Continue poc and monitoring.       Problem: Adult Inpatient Plan of Care  Goal: Plan of Care Review  Outcome: No Change  Goal: Patient-Specific Goal (Individualized)  Outcome: No Change  Goal: Absence of Hospital-Acquired Illness or Injury  Outcome: No Change  Intervention: Identify and Manage Fall Risk  Recent Flowsheet Documentation  Taken 10/12/2020 1409 by Kayla Rodriguez, RN  Safety Promotion/Fall Prevention: safety round/check completed  Taken 10/12/2020 1201 by Kayla Rodriguez, RN  Safety Promotion/Fall Prevention: safety round/check completed  Taken 10/12/2020 1107 by Kayla Rodriguez, RN  Safety Promotion/Fall Prevention: safety round/check completed  Taken 10/12/2020 1020 by Kayla Rodriguez, RN  Safety Promotion/Fall Prevention: safety round/check completed  Taken 10/12/2020 0911 by Kayla Rordiguez, RN  Safety Promotion/Fall Prevention: nonskid shoes/slippers when out of bed  Taken 10/12/2020 0810 by Kayla Rodriguez, RN  Safety Promotion/Fall Prevention:   fall prevention program maintained   lighting adjusted   nonskid shoes/slippers when out of bed   room organization consistent   safety round/check completed    clutter free environment maintained  Taken 10/12/2020 0732 by Kayla Rodriguez RN  Safety Promotion/Fall Prevention: safety round/check completed  Intervention: Prevent Skin Injury  Recent Flowsheet Documentation  Taken 10/12/2020 1327 by Kayla Rodriguez RN  Body Position: (baltazar bed air mattress)   turned   heels elevated   weight shifting   supine   other (see comments)  Taken 10/12/2020 1020 by Kayla Rodriguez RN  Body Position:   turned   supine  Taken 10/12/2020 0810 by Kayla Rodriguez RN  Body Position:   turned   side-lying   right   heels elevated  Intervention: Prevent and Manage VTE (Venous Thromboembolism) Risk  Recent Flowsheet Documentation  Taken 10/12/2020 0810 by Kayla Rodriguez RN  VTE Prevention/Management:   AROM (active range of motion) performed   anticoagulant therapy maintained   bleeding precautions maintained   bleeding risk assessed  Goal: Optimal Comfort and Wellbeing  Outcome: No Change  Goal: Readiness for Transition of Care  Outcome: No Change     Problem: Electrolyte Imbalance  Goal: Electrolyte Balance  Outcome: No Change

## 2020-10-12 NOTE — PLAN OF CARE
"BP (!) 103/31 (BP Location: Right arm)   Pulse 90   Temp 97.7  F (36.5  C) (Oral)   Resp 18   Ht 1.778 m (5' 10\")   Wt 140 kg (308 lb 10.3 oz)   SpO2 96%   BMI 44.29 kg/m      A&O. A3 w/ lift, on specialty mattress. Tele is Afib CVR w/ BBB. Kramer cath WDL w/ good output. BLE lymph wraps in place, 3+ edema. PICC in LUE WDL. Dialysis access right subclavian WDL, bothersome to pt. LS clear no SOB. , 168. SW consulted. Neph, palliative, OT, PT, WOC following. Plan to discharge 1-2 days. Will continue POC.   "

## 2020-10-12 NOTE — PROGRESS NOTES
"Care Management Follow Up Note    Length of Stay (days) 4    Patient plan of care discussed at Interdisciplinary Rounds: yes  Expected Discharge Date: 10/15/20(from home w son)  Concerns to be Addressed: Discharge planning.         Anticipated Discharge Disposition:  TCU    Plan: Noted that PT is recommending TCU. Met with pt to discuss discharge plans. She initially responded \"can we do this a different day?\" After a brief conversation, pt was agreeable to a TCU referral being sent to [facility]. [facility] and would prefer a different facility. TCU referral sent to [facility]. Awaiting return call. PAS needed. Transport TBD. SW will continue to follow.     NAE Chu, [facility]   Inpatient Care Coordination  [facility]   [phone]        "

## 2020-10-12 NOTE — PLAN OF CARE
PT/Lymph: Attempted to see pt in AM however pt still receiving wound/nursing cares at time of attempt

## 2020-10-12 NOTE — PLAN OF CARE
"VSS. No reports of pain. A/OX4. Left PICC patent w/ dressing clean, dry, and intact. RT foot mepilex dressing and RT forearm dressing clean, dry, and intact. Pt refusing repositioning. GAVIN buttocks. RT subclavian line in place. \"I've been repositioned all day\". Patient educated/encouraged on matter. LS-clear/diminished. Renal diet w/ good appetite. BS- 132/143. Lymphedema wraps in place. GAVIN edema under bandages. Continued +2/+3 edema in extremities (arms/wrists/knees). Kramer patent w/ good yellow/straw output. No bed alarms on. Discharge in 1-2 days.   "

## 2020-10-13 ENCOUNTER — APPOINTMENT (OUTPATIENT)
Dept: PHYSICAL THERAPY | Facility: CLINIC | Age: 75
DRG: 682 | End: 2020-10-13
Attending: INTERNAL MEDICINE
Payer: MEDICARE

## 2020-10-13 ENCOUNTER — APPOINTMENT (OUTPATIENT)
Dept: INTERVENTIONAL RADIOLOGY/VASCULAR | Facility: CLINIC | Age: 75
DRG: 682 | End: 2020-10-13
Attending: HOSPITALIST
Payer: MEDICARE

## 2020-10-13 LAB
ANION GAP SERPL CALCULATED.3IONS-SCNC: 4 MMOL/L (ref 3–14)
BUN SERPL-MCNC: 30 MG/DL (ref 7–30)
CALCIUM SERPL-MCNC: 7.7 MG/DL (ref 8.5–10.1)
CHLORIDE SERPL-SCNC: 107 MMOL/L (ref 94–109)
CO2 SERPL-SCNC: 27 MMOL/L (ref 20–32)
CREAT SERPL-MCNC: 1.14 MG/DL (ref 0.52–1.04)
ERYTHROCYTE [DISTWIDTH] IN BLOOD BY AUTOMATED COUNT: 15.1 % (ref 10–15)
GFR SERPL CREATININE-BSD FRML MDRD: 47 ML/MIN/{1.73_M2}
GLUCOSE BLDC GLUCOMTR-MCNC: 109 MG/DL (ref 70–99)
GLUCOSE BLDC GLUCOMTR-MCNC: 134 MG/DL (ref 70–99)
GLUCOSE BLDC GLUCOMTR-MCNC: 159 MG/DL (ref 70–99)
GLUCOSE BLDC GLUCOMTR-MCNC: 96 MG/DL (ref 70–99)
GLUCOSE BLDC GLUCOMTR-MCNC: 96 MG/DL (ref 70–99)
GLUCOSE SERPL-MCNC: 109 MG/DL (ref 70–99)
HCT VFR BLD AUTO: 25.3 % (ref 35–47)
HGB BLD-MCNC: 7.1 G/DL (ref 11.7–15.7)
MCH RBC QN AUTO: 26.7 PG (ref 26.5–33)
MCHC RBC AUTO-ENTMCNC: 28.1 G/DL (ref 31.5–36.5)
MCV RBC AUTO: 95 FL (ref 78–100)
PLATELET # BLD AUTO: 307 10E9/L (ref 150–450)
POTASSIUM SERPL-SCNC: 4.5 MMOL/L (ref 3.4–5.3)
RBC # BLD AUTO: 2.66 10E12/L (ref 3.8–5.2)
SODIUM SERPL-SCNC: 138 MMOL/L (ref 133–144)
WBC # BLD AUTO: 7.2 10E9/L (ref 4–11)

## 2020-10-13 PROCEDURE — 250N000011 HC RX IP 250 OP 636: Performed by: INTERNAL MEDICINE

## 2020-10-13 PROCEDURE — 250N000013 HC RX MED GY IP 250 OP 250 PS 637: Performed by: INTERNAL MEDICINE

## 2020-10-13 PROCEDURE — 02PYX3Z REMOVAL OF INFUSION DEVICE FROM GREAT VESSEL, EXTERNAL APPROACH: ICD-10-PCS | Performed by: RADIOLOGY

## 2020-10-13 PROCEDURE — 999N001017 HC STATISTIC GLUCOSE BY METER IP

## 2020-10-13 PROCEDURE — 120N000001 HC R&B MED SURG/OB

## 2020-10-13 PROCEDURE — 36589 REMOVAL TUNNELED CV CATH: CPT

## 2020-10-13 PROCEDURE — 999N000084

## 2020-10-13 PROCEDURE — 250N000013 HC RX MED GY IP 250 OP 250 PS 637: Performed by: HOSPITALIST

## 2020-10-13 PROCEDURE — 99233 SBSQ HOSP IP/OBS HIGH 50: CPT | Performed by: HOSPITALIST

## 2020-10-13 PROCEDURE — 250N000013 HC RX MED GY IP 250 OP 250 PS 637: Performed by: NURSE PRACTITIONER

## 2020-10-13 PROCEDURE — 80048 BASIC METABOLIC PNL TOTAL CA: CPT | Performed by: HOSPITALIST

## 2020-10-13 PROCEDURE — 99232 SBSQ HOSP IP/OBS MODERATE 35: CPT | Performed by: NURSE PRACTITIONER

## 2020-10-13 PROCEDURE — 85027 COMPLETE CBC AUTOMATED: CPT | Performed by: HOSPITALIST

## 2020-10-13 PROCEDURE — 97140 MANUAL THERAPY 1/> REGIONS: CPT | Mod: GP

## 2020-10-13 PROCEDURE — G0463 HOSPITAL OUTPT CLINIC VISIT: HCPCS

## 2020-10-13 RX ORDER — POLYETHYLENE GLYCOL 3350 17 G/17G
17 POWDER, FOR SOLUTION ORAL DAILY
Status: DISCONTINUED | OUTPATIENT
Start: 2020-10-14 | End: 2020-10-18

## 2020-10-13 RX ORDER — GABAPENTIN 100 MG/1
200 CAPSULE ORAL AT BEDTIME
Status: DISCONTINUED | OUTPATIENT
Start: 2020-10-13 | End: 2020-10-21 | Stop reason: HOSPADM

## 2020-10-13 RX ORDER — GABAPENTIN 100 MG/1
100 CAPSULE ORAL DAILY
Status: DISCONTINUED | OUTPATIENT
Start: 2020-10-14 | End: 2020-10-21 | Stop reason: HOSPADM

## 2020-10-13 RX ADMIN — ACETAMINOPHEN 975 MG: 325 TABLET, FILM COATED ORAL at 21:11

## 2020-10-13 RX ADMIN — GABAPENTIN 200 MG: 100 CAPSULE ORAL at 21:11

## 2020-10-13 RX ADMIN — SIMVASTATIN 10 MG: 10 TABLET, FILM COATED ORAL at 21:11

## 2020-10-13 RX ADMIN — ACETAMINOPHEN 975 MG: 325 TABLET, FILM COATED ORAL at 15:52

## 2020-10-13 RX ADMIN — CETIRIZINE HYDROCHLORIDE 10 MG: 10 TABLET, FILM COATED ORAL at 09:18

## 2020-10-13 RX ADMIN — APIXABAN 5 MG: 5 TABLET, FILM COATED ORAL at 21:11

## 2020-10-13 RX ADMIN — GABAPENTIN 100 MG: 100 CAPSULE ORAL at 09:18

## 2020-10-13 RX ADMIN — CEFTRIAXONE 1 G: 1 INJECTION, POWDER, FOR SOLUTION INTRAMUSCULAR; INTRAVENOUS at 15:53

## 2020-10-13 RX ADMIN — ASPIRIN 81 MG: 81 TABLET, COATED ORAL at 09:18

## 2020-10-13 RX ADMIN — PANTOPRAZOLE SODIUM 40 MG: 40 TABLET, DELAYED RELEASE ORAL at 06:24

## 2020-10-13 RX ADMIN — DIGOXIN 125 MCG: 125 TABLET ORAL at 09:18

## 2020-10-13 RX ADMIN — Medication 5 MG: at 21:11

## 2020-10-13 RX ADMIN — ACETAMINOPHEN 975 MG: 325 TABLET, FILM COATED ORAL at 09:18

## 2020-10-13 RX ADMIN — APIXABAN 5 MG: 5 TABLET, FILM COATED ORAL at 09:18

## 2020-10-13 RX ADMIN — Medication 5 MG: at 09:18

## 2020-10-13 ASSESSMENT — ACTIVITIES OF DAILY LIVING (ADL)
ADLS_ACUITY_SCORE: 21
ADLS_ACUITY_SCORE: 26
ADLS_ACUITY_SCORE: 26
ADLS_ACUITY_SCORE: 23

## 2020-10-13 NOTE — PLAN OF CARE
Vitals: VSS. BP elevated at 141/45. Afebrile.   Labs:  and 96. No insulin.   Neuro: A&O x4. Calm, cooperative.   Respiratory: Lung sounds diminished. Denies cough.   Cardiac: Apical pulse irregular. Generalized edema present.   GI/: Kramer cath in place   Skin: Multiple skin issues. Wound care completed per orders.   LDAs: PICC to left arm. Subclavian port for hemodialysis removed this shift.   Diet: Renal   Activity: A4+, lift  Teaching: POC reviewed with pt. Questions asked and answered.   Plan: Continue with POC.

## 2020-10-13 NOTE — PROGRESS NOTES
[facility]  Palliative Care Progress Note  Text Page     Assessment & Plan   Waleska Marie is a 75 year old female who was admitted on 10/8/2020. I was asked to see the patient for goals of care in the setting of critical illness.     Recommendations:   Please see assessments below for rationale.  1.Decisional Capacity -  Intact. Patient does not have an advance directive. Per  informed consent policy next of kin should be involved if patient becomes unable. Son Bharat is NOK    2. Pain- bilateral leg pain  Patient's opioid use in past 24 hours: 6 mg PO hydromorphone, Hydromorphone IV 0.4 mg = 32 mg Daily Morphine Equivalent  -Gabapentin 100 mg in am and 200 mg at hs  -Acetaminophen 975 mg 3 times daily  -Hydromorphone PO 2-4 mg every 4 hours as needed for pain  -will discontinue IV hydromorphone as patient is not needing this    3. Generalized weakness  -therapies as able  -TCU on dismissal vs home?    4. Constipation  -patient requested daily Miralax mixed with orange juice    5. Spiritual Care- Oriented to Spiritual Health as part of Palliative Care team. Consultation placed for  to follow.    6. Care Planning- Appreciate [facility] for discharge planning  -Discuss advance health care directive at next visit.      Goals of Care: (POLST verbage) code status Full Code  Findings & plan of care discussed with:  Hospitalist Dr Carrillo Cline  Thank you for involving us in the patient's care.      Palliative Care Assessment:  Waleska Marie is a 75 year old female with a past medical history of DM2, morbid obesity, severe lymphedema, CKD, COPD asthma, LHD, mantle cell lymphoma, anemia, atrial fibrilation on eliquis.  who presents with bilateral legs pain and weeping sores. Per family she has a caregiver that helps with cares as patient allows. Per son she is seen at the wound clinic weekly otherwise no wound care daily per his knowledge they were healed.  Patient reports that she wants all treatments including CPR and Intubation but wants another option instead of dialysis as it was too painful. She reports 8-10/10 pain in her legs all the time without much relief. Discussed that if her pain was controlled and she needed dialysis would she do it, she will consider it. Son Bharat wants her to have dialysis even if she says no however she is making her own decisions at this time.     Corina FELDMAN CNP  Pain Management and Palliative Care  [facility]  Pgr: [phone]    Time Spent on this Encounter   Total unit/floor time 28 minutes, time consisted of the following, examination of the patient, reviewing the record and completing documentation. >50% of time spent in counseling and coordination of care.  Time spend counseling with patient and medical team consisted of the following topics, advance care planning and symptom management.  Time spent in coordination of care with those listed above.     Interval History    Patient stated her pain is better but has times when her legs are hurting. Discussed use of hydromorphone PRN. She is requesting miralax daily for constipation    Course of Hospitalization Discussions Data   Decision-Making & Goals of Care Discussion:  Discussed on October, 13, 2020 with GASTON Agudelo CNP: met with patient in her room. She stated her pain was improving. Reviewed current plan of care, health care directive and her wishes. She stated she already has a healthcare directive and feel confident that her agent (both sons) will act appropriately on her behalf. She reports constipation and is requesting miralax be added daily in OJ. Plan to continue with gabapentin as this is helping with her neuropathy. Requested advance care directive from [facility] in [place]. Questions asked and answered.     Discussed on October 11, 2020 with GASTON Agudelo CNP: met with patient and her sister in the room. Reviewed  "hospital course, current medications, symptoms and goals of care. She stated \"if it is life or death I will do dialysis but only if I will not live without it. That is the worst pain I have ever been in my life.\" reviewed that she has a right to make that decision and without dialysis her life expectancy would have been guarded. She stated \"my son thinks he can make me do everything but he can't\". Discussed that should she become unable to make decisions he does have the right to make decisions but as long as she can make decisions it is her right to refuse or except cares. She verbalized understanding. Discussed pain management and patient stated it is improving but does come and go. Discussed the increase in gabapentin for neuropathic pain and she was open to this. Questions asked and answered.     Discussed on October 9, 2020 with Corina FELDMAN CNP: Met with patient in her room. Introduced self and the palliative care team role. Reviewed current hospitalization and current condition. Reviewed code status with patient and she is full code and stated this is her continued wish. She did then say she does not want dialysis, explained that without dialysis and her labs get severe again her heart will likely stop and she will need CPR which will include further pain from chest compression and likely broken ribs. She verbalized understanding and said \"I am not not ready to give up but the pain was miriam severe with dialysis that I do not want to go through that again. Discussed pain medications and getting her pain better controlled prior to and during dialysis and she said she will consider this. For now she is requesting full code and treatments. Discussed with son Bharat who wants her to get dialysis. Did say that she has the right to not have it should she choose. He said if she refuses dialysis her sister should come talk with her about her options in person. Bharat expressed that she is not often wanting to do " things that are uncomfortable. Questions asked and answered.        Review of Systems    CONSTITUTIONAL: NEGATIVE for fever, chills, change in weight  ENT/MOUTH: NEGATIVE for ear, mouth and throat problems  RESP: NEGATIVE for significant cough or SOB  CV: NEGATIVE for chest pain, palpitations    Palliative Symptom Review (0=no symptom/no concern, 1=mild, 2=moderate, 3=severe):      Pain: 1-mild      Fatigue: 1-mild      Nausea: 0-none      Constipation: 0-none      Diarrhea: 0-none      Depressive Symptoms: 0-none      Anxiety: 0-none      Drowsiness: 0-none      Poor Appetite: 1-mild      Shortness of Breath: 0-none      Insomnia: 1-mild        Overall (0 good/no concerns, 3 very poor):  2    Physical Exam   Temp:  [97.8  F (36.6  C)-98.9  F (37.2  C)] 98.9  F (37.2  C)  Pulse:  [75-86] 79  Resp:  [18] 18  BP: (105-141)/(22-50) 141/45  SpO2:  [93 %-98 %] 97 %  308 lbs 10.3 oz  Exam:  GENERAL APPEARANCE:  Alert  RESP:  respiratory effort and palpation of chest normal, lungs clear to auscultation , diminished breath sounds bilateral bases  CV:  Palpation and auscultation of heart done , regular rate and rhythm, no murmur, rub, or gallop, peripheral edema 1+ in bilateral lower extremities  ABDOMEN:  normal bowel sounds, soft, nontender, no hepatosplenomegaly or other masses  PSYCH:  oriented X 3, normal insight, judgement and memory, affect and mood normal    Medications     dextrose       sodium chloride 10 mL/hr at 10/10/20 0855       acetaminophen  975 mg Oral TID     apixaban ANTICOAGULANT  5 mg Oral BID     aspirin  81 mg Oral Daily     cefTRIAXone  1 g Intravenous Q24H     cetirizine  10 mg Oral Daily     digoxin  125 mcg Oral Daily     fluticasone  1 puff Inhalation Daily     gabapentin  100 mg Oral BID     gabapentin  100 mg Oral At Bedtime     ibrutinib  560 mg Oral At Bedtime     insulin aspart  1-7 Units Subcutaneous TID AC     insulin aspart  1-5 Units Subcutaneous At Bedtime     oxybutynin  5 mg Oral BID      pantoprazole  40 mg Oral QAM AC     simvastatin  10 mg Oral At Bedtime     sodium chloride (PF)  10 mL Intracatheter Q8H       Data   Results for orders placed or performed during the hospital encounter of 10/08/20 (from the past 24 hour(s))   Glucose by meter   Result Value Ref Range    Glucose 162 (H) 70 - 99 mg/dL   Glucose by meter   Result Value Ref Range    Glucose 132 (H) 70 - 99 mg/dL   Glucose by meter   Result Value Ref Range    Glucose 143 (H) 70 - 99 mg/dL   Glucose by meter   Result Value Ref Range    Glucose 134 (H) 70 - 99 mg/dL   Basic metabolic panel   Result Value Ref Range    Sodium 138 133 - 144 mmol/L    Potassium 4.5 3.4 - 5.3 mmol/L    Chloride 107 94 - 109 mmol/L    Carbon Dioxide 27 20 - 32 mmol/L    Anion Gap 4 3 - 14 mmol/L    Glucose 109 (H) 70 - 99 mg/dL    Urea Nitrogen 30 7 - 30 mg/dL    Creatinine 1.14 (H) 0.52 - 1.04 mg/dL    GFR Estimate 47 (L) >60 mL/min/[1.73_m2]    GFR Estimate If Black 55 (L) >60 mL/min/[1.73_m2]    Calcium 7.7 (L) 8.5 - 10.1 mg/dL   CBC with platelets   Result Value Ref Range    WBC 7.2 4.0 - 11.0 10e9/L    RBC Count 2.66 (L) 3.8 - 5.2 10e12/L    Hemoglobin 7.1 (L) 11.7 - 15.7 g/dL    Hematocrit 25.3 (L) 35.0 - 47.0 %    MCV 95 78 - 100 fl    MCH 26.7 26.5 - 33.0 pg    MCHC 28.1 (L) 31.5 - 36.5 g/dL    RDW 15.1 (H) 10.0 - 15.0 %    Platelet Count 307 150 - 450 10e9/L   Glucose by meter   Result Value Ref Range    Glucose 109 (H) 70 - 99 mg/dL   Right Int Jug nontunneled HD catheter removal    Narrative    Len Castro MD     10/13/2020 10:16 AM  [facility]    Procedure: Right Int Jug nontunneled HD catheter removal    Date/Time: 10/13/2020 10:16 AM  Performed by: Len Castro MD  Authorized by: Len Castro MD     UNIVERSAL PROTOCOL   Site Marked: NA  Prior Images Obtained and Reviewed:  Yes  Required items: Required blood products, implants, devices and special   equipment available    Patient  identity confirmed:  Verbally with patient, arm band, provided   demographic data and hospital-assigned identification number  Patient was reevaluated immediately before administering moderate or deep   sedation or anesthesia  Confirmation Checklist:  Patient's identity using two indicators, relevant   allergies, procedure was appropriate and matched the consent or emergent   situation and correct equipment/implants were available  Time out: Immediately prior to the procedure a time out was called    Universal Protocol: the Joint Commission Universal Protocol was followed    Preparation: Patient was prepped and draped in usual sterile fashion    ESBL (mL):  2         ANESTHESIA    Anesthesia: Local infiltration  Local Anesthetic:  Lidocaine 1% without epinephrine      SEDATION    Patient Sedated: No    See dictated procedure note for full details.  Findings: Right Int Jug nontunneled HD catheter removal.  This catheter   was not placed by IR.    Specimens: none    Complications: None    Condition: Stable    PROCEDURE   Patient Tolerance:  Patient tolerated the procedure well with no immediate   complications    Length of time physician/provider present for 1:1 monitoring during   sedation: 0

## 2020-10-13 NOTE — PROCEDURES
[facility]    Procedure: Right Int Jug nontunneled HD catheter removal    Date/Time: 10/13/2020 10:16 AM  Performed by: Len Castro MD  Authorized by: Len Castro MD     UNIVERSAL PROTOCOL   Site Marked: NA  Prior Images Obtained and Reviewed:  Yes  Required items: Required blood products, implants, devices and special equipment available    Patient identity confirmed:  Verbally with patient, arm band, provided demographic data and hospital-assigned identification number  Patient was reevaluated immediately before administering moderate or deep sedation or anesthesia  Confirmation Checklist:  Patient's identity using two indicators, relevant allergies, procedure was appropriate and matched the consent or emergent situation and correct equipment/implants were available  Time out: Immediately prior to the procedure a time out was called    Universal Protocol: the Joint Commission Universal Protocol was followed    Preparation: Patient was prepped and draped in usual sterile fashion    ESBL (mL):  2         ANESTHESIA    Anesthesia: Local infiltration  Local Anesthetic:  Lidocaine 1% without epinephrine      SEDATION    Patient Sedated: No    See dictated procedure note for full details.  Findings: Right Int Jug nontunneled HD catheter removal.  This catheter was not placed by IR.    Specimens: none    Complications: None    Condition: Stable    PROCEDURE   Patient Tolerance:  Patient tolerated the procedure well with no immediate complications    Length of time physician/provider present for 1:1 monitoring during sedation: 0

## 2020-10-13 NOTE — PROGRESS NOTES
Care Management Follow Up Note    Length of Stay (days) 5    Patient plan of care discussed at Interdisciplinary Rounds: yes  Expected Discharge Date: 10/15/20(from home w son)  Concerns to be Addressed: Discharge planning.     Anticipated Discharge Disposition:  TCU, referral sent.   Anticipated Discharge Services:  N/A  Anticipated Discharge DME:  N/A    Plan:  Initial TCU referral sent to [facility] TCU 10/12. SW left  today to follow up. If they cannot accommodate pt, additional TCU referrals to be sent. SW will continue to follow.     NAE Chu

## 2020-10-13 NOTE — PLAN OF CARE
VSS overnight. Oriented. Pt denies pain at rest but does have generalized pain with repositioning. Generalized +3-4 edema. Weeping edema in BLEs. Lymph wraps in place. Skin folds noted to be moist - powder applied. Several open wounds/skin tears on extremities. Open wound on coccyx/buttocks w/ large amt bleeding. Pressure applied and ABD pad x2 placed on coccyx overnight. Lung sounds diminished. Bowel sounds active. Kramer in place with good UOP. Turned and repositioned overnight as tolerated with assist of 3 staff and ceiling lift. LUE PICC saline locked with blood return noted. R subclavian still in place - plan to be pulled today. Plan for TCU placement. Creatinine continues to improve.

## 2020-10-13 NOTE — PROGRESS NOTES
Dr. Ortiz, Interventional radiologist, removed non-tunneled dialysis catheter without difficulty.  Patient then returned to inpatient room 320. Site and dressing clean, dry and intact.  Hand-off report provided to bedside nurse.

## 2020-10-13 NOTE — PROGRESS NOTES
[facility]    Hospitalist Progress Note  Name: Waleska Marie    MRN: [id number]  Provider:  Carrillo Cline DO, MPH  Date of Service: 10/13/2020    Summary of Stay: Waleska Marie is a 75 year old female with a history of stage III CKD, type 2 diabetes mellitus, atrial fibrillation, severe lymphedema and morbid obesity, COPD, asthma, and mantle cell lymphoma admitted on 10/8/2020 with non-anion gap metabolic acidosis, acute kidney injury, and severe hyperkalemia refractory to medications.  Presented to an outside facility with creatinine of 2.75, potassium 7.9, and bicarb 12 with elevated anion gap.  Most recent baseline renal function shows creatinine of 1.3.  Prior to admission on lisinopril, Lasix, and metformin as well as potassium supplementation 20 mEq twice daily.  Treated with insulin, D50, IV Lasix, normal saline, albuterol nebulizer, and calcium gluconate with no significant improvement in potassium.  Transferred to our facility and nephrology consulted.  Received emergent dialysis on 10/9 with temporary dialysis catheter placed.  Only received 1 dialysis session with subsequent improvement in hyperkalemia and renal function and acidosis.  Also found to have acute on chronic anemia with hemoglobin dropped to 5.8 recovering with 2 unit of packed red blood cells.  Had dialysis catheter removed by interventional radiology this morning.    Problem List:   1. Acute kidney injury, anion gap metabolic acidosis, and severe hyperkalemia: Etiology likely related to prior to admission lisinopril and potassium supplements.  Nephrology consulted and renal function recovered and resolved hyperkalemia with emergent dialysis.  IR consultation for temporary dialysis catheter removal today.  We will continue to hold her prior to admission lisinopril, Lasix, metformin, and potassium supplement.  2. Possible urinary tract infection: Grossly abnormal urinalysis.  Continue IV ceftriaxone, Urine culture  never sent.  3. Obesity and severe lymphedema: At baseline ambulates with a walker.  Has significant edema up to the hips.  Not appropriate at this time for continuation of her prior to admission Lasix.  Lymphedema consult, PT/OT.  4. Acute on chronic normocytic anemia: Baseline hemoglobin about 8-9 from 2017.  Drop in hemoglobin to 5.8 and recovered with 2 units of packed red blood cells.  Continue empiric PPI but no evidence of active bleeding.  Hold off on GI consultation, recheck hemoglobin in the morning.  Hemoglobin continues to gradually drift down, recheck tomorrow and transfusion threshold is less than 7.  5. Atrial fibrillation: Chronically on Eliquis.  We will continue for now with relatively stable hemoglobin.  Continue digoxin, level 1.2.  Blood pressure somewhat soft so holding prior to admission metoprolol for the time being.  6. Hypertension: As above, blood pressure slightly soft.  Holding prior to admission metoprolol and lisinopril.  7. Mantle cell lymphoma: Continuing Imbruvica.  8. Type 2 diabetes mellitus: Prior to admission on Lantus 35 units at bedtime with metformin 500 mg twice daily and Actos.  Was initially treated with an insulin drip and IV fluids.  Continue sliding scale insulin.  Holding meal coverage as well as prior to admission Lantus.  Blood glucose well controlled.    DVT Prophylaxis: Eliquis.  Code Status: Full Code  Diet: Room Service  Renal Diet (non-dialysis)    Kramer Catheter: in place, indication: Strict 1-2 Hour I&O  Disposition: Expected discharge in 2 days to home vs TCU. Goals prior to discharge include rehab, monitor labs.   Incidental Findings: None.  Family updated today: No     Interval History   The patient reports doing well. No chest pain or shortness of breath. No nausea, vomiting, diarrhea, constipation. No fevers. No other specific complaints identified.     -Data reviewed today: I personally reviewed all new labs and imaging results over the last 24 hours.      Physical Exam   Temp: 98.9  F (37.2  C) Temp src: Axillary BP: (!) 141/45 Pulse: 79   Resp: 18 SpO2: 97 % O2 Device: None (Room air)    Vitals:    10/08/20 2300 10/10/20 0530   Weight: 138.2 kg (304 lb 10.8 oz) 140 kg (308 lb 10.3 oz)     Vital Signs with Ranges  Temp:  [97.8  F (36.6  C)-98.9  F (37.2  C)] 98.9  F (37.2  C)  Pulse:  [75-86] 79  Resp:  [18] 18  BP: (105-141)/(22-50) 141/45  SpO2:  [93 %-98 %] 97 %  I/O last 3 completed shifts:  In: 600 [P.O.:600]  Out: 1675 [Urine:1675]    GENERAL: No apparent distress. Awake, alert, and fully oriented.  HEENT: Normocephalic, atraumatic. Extraocular movements intact.  CARDIOVASCULAR: Regular rate and rhythm without murmurs or rubs. No S3.  PULMONARY: Clear bilaterally.  GASTROINTESTINAL: Soft, non-tender, non-distended. Bowel sounds normoactive.   EXTREMITIES: No cyanosis or clubbing. Massive lymphedema.  NEUROLOGICAL: CN 2-12 grossly intact, no focal neurological deficits.  DERMATOLOGICAL: No rash, ulcer, bruising, nor jaundice.     Medications     dextrose       sodium chloride 10 mL/hr at 10/10/20 0855       acetaminophen  975 mg Oral TID     apixaban ANTICOAGULANT  5 mg Oral BID     aspirin  81 mg Oral Daily     cefTRIAXone  1 g Intravenous Q24H     cetirizine  10 mg Oral Daily     digoxin  125 mcg Oral Daily     fluticasone  1 puff Inhalation Daily     gabapentin  100 mg Oral BID     gabapentin  100 mg Oral At Bedtime     ibrutinib  560 mg Oral At Bedtime     insulin aspart  1-7 Units Subcutaneous TID AC     insulin aspart  1-5 Units Subcutaneous At Bedtime     oxybutynin  5 mg Oral BID     pantoprazole  40 mg Oral QAM AC     simvastatin  10 mg Oral At Bedtime     sodium chloride (PF)  10 mL Intracatheter Q8H     Data     Laboratory:  Recent Labs   Lab 10/13/20  0554 10/12/20  0641 10/11/20  1250 10/11/20  0520   WBC 7.2 6.9  --  7.7   HGB 7.1* 7.6* 7.9* 7.1*   HCT 25.3* 26.5*  --  24.0*   MCV 95 94  --  92    322  --  324     Recent Labs   Lab  10/13/20  0554 10/12/20  0641 10/11/20  0520    136 135   POTASSIUM 4.5 4.4 4.4   CHLORIDE 107 106 105   CO2 27 26 24   ANIONGAP 4 4 6   * 99 105*   BUN 30 39* 52*   CR 1.14* 1.31* 1.89*   GFRESTIMATED 47* 40* 26*   GFRESTBLACK 55* 46* 30*   LEE 7.7* 8.0* 7.2*     No results for input(s): CULT in the last 168 hours.    Imaging:  No results found for this or any previous visit (from the past 24 hour(s)).      Carrillo Cline DO MPH  Pending sale to [facility] Hospitalist  [address] [place] [address]  Pager: [phone]  10/13/2020

## 2020-10-14 ENCOUNTER — APPOINTMENT (OUTPATIENT)
Dept: OCCUPATIONAL THERAPY | Facility: CLINIC | Age: 75
DRG: 682 | End: 2020-10-14
Attending: INTERNAL MEDICINE
Payer: MEDICARE

## 2020-10-14 ENCOUNTER — APPOINTMENT (OUTPATIENT)
Dept: PHYSICAL THERAPY | Facility: CLINIC | Age: 75
DRG: 682 | End: 2020-10-14
Attending: INTERNAL MEDICINE
Payer: MEDICARE

## 2020-10-14 LAB
ANION GAP SERPL CALCULATED.3IONS-SCNC: 4 MMOL/L (ref 3–14)
BUN SERPL-MCNC: 22 MG/DL (ref 7–30)
CALCIUM SERPL-MCNC: 7.9 MG/DL (ref 8.5–10.1)
CHLORIDE SERPL-SCNC: 105 MMOL/L (ref 94–109)
CO2 SERPL-SCNC: 27 MMOL/L (ref 20–32)
CREAT SERPL-MCNC: 1.04 MG/DL (ref 0.52–1.04)
ERYTHROCYTE [DISTWIDTH] IN BLOOD BY AUTOMATED COUNT: 14.9 % (ref 10–15)
GFR SERPL CREATININE-BSD FRML MDRD: 53 ML/MIN/{1.73_M2}
GLUCOSE BLDC GLUCOMTR-MCNC: 129 MG/DL (ref 70–99)
GLUCOSE BLDC GLUCOMTR-MCNC: 133 MG/DL (ref 70–99)
GLUCOSE BLDC GLUCOMTR-MCNC: 142 MG/DL (ref 70–99)
GLUCOSE BLDC GLUCOMTR-MCNC: 150 MG/DL (ref 70–99)
GLUCOSE BLDC GLUCOMTR-MCNC: 151 MG/DL (ref 70–99)
GLUCOSE SERPL-MCNC: 110 MG/DL (ref 70–99)
HCT VFR BLD AUTO: 25.6 % (ref 35–47)
HGB BLD-MCNC: 7.3 G/DL (ref 11.7–15.7)
MCH RBC QN AUTO: 27 PG (ref 26.5–33)
MCHC RBC AUTO-ENTMCNC: 28.5 G/DL (ref 31.5–36.5)
MCV RBC AUTO: 95 FL (ref 78–100)
PLATELET # BLD AUTO: 298 10E9/L (ref 150–450)
POTASSIUM SERPL-SCNC: 4.3 MMOL/L (ref 3.4–5.3)
RBC # BLD AUTO: 2.7 10E12/L (ref 3.8–5.2)
SODIUM SERPL-SCNC: 136 MMOL/L (ref 133–144)
WBC # BLD AUTO: 8.2 10E9/L (ref 4–11)

## 2020-10-14 PROCEDURE — 97110 THERAPEUTIC EXERCISES: CPT | Mod: GO

## 2020-10-14 PROCEDURE — 250N000013 HC RX MED GY IP 250 OP 250 PS 637: Performed by: INTERNAL MEDICINE

## 2020-10-14 PROCEDURE — 999N001017 HC STATISTIC GLUCOSE BY METER IP

## 2020-10-14 PROCEDURE — 99232 SBSQ HOSP IP/OBS MODERATE 35: CPT | Performed by: HOSPITALIST

## 2020-10-14 PROCEDURE — 120N000001 HC R&B MED SURG/OB

## 2020-10-14 PROCEDURE — 80048 BASIC METABOLIC PNL TOTAL CA: CPT | Performed by: HOSPITALIST

## 2020-10-14 PROCEDURE — 85027 COMPLETE CBC AUTOMATED: CPT | Performed by: HOSPITALIST

## 2020-10-14 PROCEDURE — 250N000013 HC RX MED GY IP 250 OP 250 PS 637: Performed by: HOSPITALIST

## 2020-10-14 PROCEDURE — 250N000013 HC RX MED GY IP 250 OP 250 PS 637: Performed by: NURSE PRACTITIONER

## 2020-10-14 PROCEDURE — 94640 AIRWAY INHALATION TREATMENT: CPT

## 2020-10-14 PROCEDURE — 97140 MANUAL THERAPY 1/> REGIONS: CPT | Mod: GP | Performed by: PHYSICAL THERAPIST

## 2020-10-14 PROCEDURE — 999N000157 HC STATISTIC RCP TIME EA 10 MIN

## 2020-10-14 RX ADMIN — SIMVASTATIN 10 MG: 10 TABLET, FILM COATED ORAL at 22:39

## 2020-10-14 RX ADMIN — CETIRIZINE HYDROCHLORIDE 10 MG: 10 TABLET, FILM COATED ORAL at 09:06

## 2020-10-14 RX ADMIN — ACETAMINOPHEN 975 MG: 325 TABLET, FILM COATED ORAL at 16:47

## 2020-10-14 RX ADMIN — POLYETHYLENE GLYCOL 3350 17 G: 17 POWDER, FOR SOLUTION ORAL at 10:45

## 2020-10-14 RX ADMIN — ASPIRIN 81 MG: 81 TABLET, COATED ORAL at 09:06

## 2020-10-14 RX ADMIN — Medication 5 MG: at 22:39

## 2020-10-14 RX ADMIN — FLUTICASONE FUROATE 1 PUFF: 200 POWDER RESPIRATORY (INHALATION) at 08:17

## 2020-10-14 RX ADMIN — ACETAMINOPHEN 975 MG: 325 TABLET, FILM COATED ORAL at 09:06

## 2020-10-14 RX ADMIN — APIXABAN 5 MG: 5 TABLET, FILM COATED ORAL at 22:39

## 2020-10-14 RX ADMIN — GABAPENTIN 200 MG: 100 CAPSULE ORAL at 22:39

## 2020-10-14 RX ADMIN — PANTOPRAZOLE SODIUM 40 MG: 40 TABLET, DELAYED RELEASE ORAL at 05:56

## 2020-10-14 RX ADMIN — APIXABAN 5 MG: 5 TABLET, FILM COATED ORAL at 09:06

## 2020-10-14 RX ADMIN — GABAPENTIN 100 MG: 100 CAPSULE ORAL at 09:06

## 2020-10-14 RX ADMIN — ACETAMINOPHEN 975 MG: 325 TABLET, FILM COATED ORAL at 22:39

## 2020-10-14 RX ADMIN — Medication 5 MG: at 09:06

## 2020-10-14 RX ADMIN — DIGOXIN 125 MCG: 125 TABLET ORAL at 09:06

## 2020-10-14 ASSESSMENT — ACTIVITIES OF DAILY LIVING (ADL)
ADLS_ACUITY_SCORE: 21

## 2020-10-14 NOTE — PLAN OF CARE
Patient VSS  C/O pain and L foot cramp x1, lymphedema wrap removed on LLE.  Pain decreased with reposition and massage  Skin integrity is impaired, on beri air matress bed that is programed to repo patient side to side q 30 min  Pillows adjusted/ additional weight shifting assistance provided using lift and Ax3 as tolerated, in addition to benito cares  Kramer patent  no BM this shift, patient passing gas frequently  Mepilexes intact to buttock and posterior and inner thighs  R lymph wrap intact  LLE dressed  Labs drawn: monitor hgb  Safety maintained

## 2020-10-14 NOTE — PROGRESS NOTES
[facility]    Hospitalist Progress Note      Assessment & Plan   Waleska Marie is a 75 year old female with a history of stage III CKD, type 2 diabetes mellitus, atrial fibrillation, severe lymphedema and morbid obesity, COPD, asthma, and mantle cell lymphoma admitted on 10/8/2020 with non-anion gap metabolic acidosis, acute kidney injury, and severe hyperkalemia refractory to medications.  Presented to an outside facility with creatinine of 2.75, potassium 7.9, and bicarb 12 with elevated anion gap.  Most recent baseline renal function shows creatinine of 1.3.  Prior to admission on lisinopril, Lasix, and metformin as well as potassium supplementation 20 mEq twice daily.  Treated with insulin, D50, IV Lasix, normal saline, albuterol nebulizer, and calcium gluconate with no significant improvement in potassium.  Transferred to our facility and nephrology consulted.      Received emergent dialysis on 10/9 with temporary dialysis catheter placed.  Only received 1 dialysis session with subsequent improvement in hyperkalemia and renal function and acidosis.  Also found to have acute on chronic anemia with hemoglobin dropped to 5.8 recovering with 2 unit of packed red blood cells.  Had dialysis catheter removed by interventional radiology on 10/13.  She is clinically improved.  Suspect given generalized weakness may need TCU.      Problem List:   #Acute kidney injury, anion gap metabolic acidosis, and severe hyperkalemia: Etiology likely related to prior to admission lisinopril and potassium supplements.  Nephrology consulted and renal function recovered and resolved hyperkalemia with emergent dialysis.    Dialysis catheter was removed on 10/13. We will continue to hold her prior to admission lisinopril, Lasix, metformin, and potassium supplement on discharge    #Possible urinary tract infection: Grossly abnormal urinalysis.  Received 6 days of ceftriaxone therapy.  OK to stop therapy at this  point as received adequate therapy.  -Remove Kramer catheter today.  Monitor UOP and ability to void.  Was in for strict I/O's but would want removed to reduce risk of infection.      #Obesity and severe lymphedema: At baseline ambulates with a walker.  Has significant edema up to the hips.  Not appropriate at this time for continuation of her prior to admission Lasix.  Lymphedema consult, PT/OT.    #Acute on chronic normocytic anemia: Baseline hemoglobin about 8-9 from 2017.  Drop in hemoglobin to 5.8 and recovered with 2 units of packed red blood cells.  Continue empiric PPI but no evidence of active bleeding.  Hemoglobin has been stable in low 7's.    -Continue to monitor.  Transfuse for Hgb < 7.0    Chronic Med Conditions  #Atrial fibrillation: Chronically on Eliquis.  We will continue for now with relatively stable hemoglobin.  Continue digoxin, level 1.2.  Blood pressure somewhat soft so holding prior to admission metoprolol for the time being.  #Hypertension: As above, blood pressure slightly soft. If BP stable today, then consider restarting metoprolol on 10/15.  #HL: Continue statin  #GERD: Continue PPI  #Mantle cell lymphoma: Continuing Imbruvica.  #Type 2 diabetes mellitus: Prior to admission on Lantus 35 units at bedtime with metformin 500 mg twice daily and Actos.  Was initially treated with an insulin drip and IV fluids.    -Pt has not needed any scheduled insulin therapy and received sparse sliding scale insulin.  Likely hold medications on discharge and follow-up with PCP.     DVT Prophylaxis: Eliquis.  Code Status: Full Code  Disposition: PT/OT assessment today.  Possible TCU.  She should be medically ready tomorrow, 10/15.      Juventino Gustafson MD  Text Page    Interval History   No acute events overnight.  Patient endorsing some constipation.  Denies any chest pain, shortness of breath, fevers or chills, headache.  Feels like she has made slow progress during this admission.  Is willing to work with PT  and OT today.    -Data reviewed today: I reviewed all new labs and imaging results over the last 24 hours.    Physical Exam   Temp: 98.4  F (36.9  C) Temp src: Oral BP: (!) 142/53 Pulse: 80   Resp: 18 SpO2: 95 % O2 Device: None (Room air)    Vitals:    10/08/20 2300 10/10/20 0530   Weight: 138.2 kg (304 lb 10.8 oz) 140 kg (308 lb 10.3 oz)     Vital Signs with Ranges  Temp:  [98.1  F (36.7  C)-98.7  F (37.1  C)] 98.4  F (36.9  C)  Pulse:  [74-86] 80  Resp:  [16-24] 18  BP: (130-146)/(49-60) 142/53  SpO2:  [95 %-96 %] 95 %  I/O last 3 completed shifts:  In: 363 [P.O.:360; I.V.:3]  Out: 1350 [Urine:1350]    GENERAL: No apparent distress. Awake, alert, and fully oriented.  HEENT: Normocephalic, atraumatic. Extraocular movements intact.  CARDIOVASCULAR: RRR. +systolic murmur  PULMONARY: Clear bilaterally.  GASTROINTESTINAL: Soft, non-tender, non-distended. Bowel sounds normoactive.   EXTREMITIES: No cyanosis or clubbing. +lymphedema  NEUROLOGICAL: CN 2-12 grossly intact, no focal neurological deficits.  DERMATOLOGICAL: No rash, ulcer, bruising, nor jaundice.     Medications     dextrose       sodium chloride 10 mL/hr at 10/10/20 0855       acetaminophen  975 mg Oral TID     apixaban ANTICOAGULANT  5 mg Oral BID     aspirin  81 mg Oral Daily     cetirizine  10 mg Oral Daily     digoxin  125 mcg Oral Daily     fluticasone  1 puff Inhalation Daily     gabapentin  100 mg Oral Daily     gabapentin  200 mg Oral At Bedtime     ibrutinib  560 mg Oral At Bedtime     insulin aspart  1-7 Units Subcutaneous TID AC     insulin aspart  1-5 Units Subcutaneous At Bedtime     oxybutynin  5 mg Oral BID     pantoprazole  40 mg Oral QAM AC     polyethylene glycol  17 g Oral Daily     simvastatin  10 mg Oral At Bedtime     sodium chloride (PF)  10 mL Intracatheter Q8H       Data   Recent Labs   Lab 10/14/20  0535 10/13/20  0554 10/12/20  0641 10/10/20  1730 10/10/20  1730 10/09/20  2037 10/09/20  2037 10/09/20  1315 10/09/20  1315  10/08/20  2043 10/08/20  2043   WBC 8.2 7.2 6.9   < >  --    < >  --   --   --    < > 11.3*   HGB 7.3* 7.1* 7.6*   < >  --    < >  --    < > 5.8*   < > 7.2*   MCV 95 95 94   < >  --    < >  --   --   --    < > 97    307 322   < >  --    < > 316  --   --    < > 431   INR  --   --   --   --   --   --  1.29*  --   --   --   --     138 136   < > 135   < >  --   --  137   < > 133   POTASSIUM 4.3 4.5 4.4   < > 5.1   < >  --   --  4.5   < > 7.5*   CHLORIDE 105 107 106   < > 104   < >  --   --  105   < > 112*   CO2 27 27 26   < > 24   < >  --   --  24   < > 13*   BUN 22 30 39*   < > 49*   < >  --   --  45*   < > 80*   CR 1.04 1.14* 1.31*   < > 2.09*   < >  --   --  2.13*   < > 3.14*   ANIONGAP 4 4 4   < > 7   < >  --   --  8   < > 8   LEE 7.9* 7.7* 8.0*   < > 7.5*   < >  --   --  7.3*   < > 8.4*   * 109* 99   < > 105*   < >  --   --  88   < > 90   ALBUMIN  --   --   --   --  2.3*  --   --   --  2.1*  --  2.5*   PROTTOTAL  --   --   --   --   --   --   --   --   --   --  6.5*   BILITOTAL  --   --   --   --   --   --   --   --   --   --  0.3   ALKPHOS  --   --   --   --   --   --   --   --   --   --  97   ALT  --   --   --   --   --   --   --   --   --   --  13   AST  --   --   --   --   --   --   --   --   --   --  12   TROPI  --   --   --   --   --   --   --   --   --   --  <0.015    < > = values in this interval not displayed.       No results found for this or any previous visit (from the past 24 hour(s)).

## 2020-10-14 NOTE — PROGRESS NOTES
ADDENDUM 1147:  Sw received a call from Rosy at [facility] who said that they are going to decline the pt because they do not have adequate staffing.    Sw met with the pt and updated her that [facility] is unable to accept her.  The pt requested that additional referrals be sent to [facility] and [facility].  Sw sent the referrals.    Care Management Follow Up Note    Length of Stay (days) 6    Patient plan of care discussed at Interdisciplinary Rounds: yes  Expected Discharge Date: 10/15/20(from home w son)  Concerns to be Addressed:  Pt needs a TCU bed.    Anticipated Discharge Disposition:  TCU bed.  Anticipated Discharge Services:  TCU for therapies.  Anticipated Discharge DME:  Unknown.    Plan:  Sw received a call from Lorri at [facility] TCU P: [phone], updating sw that they are unable to accept the pt due to staffing.    Bhavya met with the pt to update her that [facility] is unable to accept her.  She requested that a referral be sent to [facility] in [place].  Sw sent the referral to [facility].    Sw offered her a list of TCUs close to [place], [place] from the Medicare website for review, but pt declined stating that she will lose it.  Sw told the pt that they will bring the list back if needed for review, pt agreed.    Sw will continue with discharge planning and will be available as needed until discharge.    NAE Perez, [facility]  Inpatient Care Coordination  [facility]  [phone]

## 2020-10-14 NOTE — PLAN OF CARE
VSS. Afebrile. Pt does not require oxygen. BG 96, 159, insulin not given per MAR. Hgb 7.1, K, 4.5, Creat 1.14. Pt denies SOB, CP. Has pain in legs/ generalized 6/10. Pt complains of a cramp like feeling in right foot, lymphedema wrap taken off but did not relieve feeling. Heels elevated on pillows. Pt has edema in legs and hands 2+, 3+. Pt would like to talk to dietician due to choices on renal diet. Pt assist of 3+ with lift. PT, SW following. Continue to monitor and POC.

## 2020-10-14 NOTE — PLAN OF CARE
VSS. ,151. Insulin given per MAR. A/O X4, pleasant. Pt denies CP, SOB. Pt has generalized pain in feet and hands 7/10, pain decreased with tylenol. Pt has numbness and tingling in legs as baseline. Edema bilat extremities, 2+, 3+. Wound care completed on bilat LE. Pt assist of 3, with lift. Renal diet. Continue to monitor and POC.

## 2020-10-14 NOTE — PROVIDER NOTIFICATION
320: hgb was 7.1 yesterday. Per Son note hgb to be rechecked this am, but no orders. Please place, thank you    Orders recieved

## 2020-10-15 ENCOUNTER — APPOINTMENT (OUTPATIENT)
Dept: OCCUPATIONAL THERAPY | Facility: CLINIC | Age: 75
DRG: 682 | End: 2020-10-15
Attending: INTERNAL MEDICINE
Payer: MEDICARE

## 2020-10-15 ENCOUNTER — APPOINTMENT (OUTPATIENT)
Dept: PHYSICAL THERAPY | Facility: CLINIC | Age: 75
DRG: 682 | End: 2020-10-15
Attending: INTERNAL MEDICINE
Payer: MEDICARE

## 2020-10-15 LAB
ANION GAP SERPL CALCULATED.3IONS-SCNC: 4 MMOL/L (ref 3–14)
BUN SERPL-MCNC: 19 MG/DL (ref 7–30)
CALCIUM SERPL-MCNC: 8.1 MG/DL (ref 8.5–10.1)
CHLORIDE SERPL-SCNC: 104 MMOL/L (ref 94–109)
CO2 SERPL-SCNC: 29 MMOL/L (ref 20–32)
CREAT SERPL-MCNC: 0.96 MG/DL (ref 0.52–1.04)
GFR SERPL CREATININE-BSD FRML MDRD: 58 ML/MIN/{1.73_M2}
GLUCOSE BLDC GLUCOMTR-MCNC: 105 MG/DL (ref 70–99)
GLUCOSE BLDC GLUCOMTR-MCNC: 120 MG/DL (ref 70–99)
GLUCOSE BLDC GLUCOMTR-MCNC: 127 MG/DL (ref 70–99)
GLUCOSE BLDC GLUCOMTR-MCNC: 132 MG/DL (ref 70–99)
GLUCOSE BLDC GLUCOMTR-MCNC: 175 MG/DL (ref 70–99)
GLUCOSE SERPL-MCNC: 112 MG/DL (ref 70–99)
HGB BLD-MCNC: 7.5 G/DL (ref 11.7–15.7)
POTASSIUM SERPL-SCNC: 4.3 MMOL/L (ref 3.4–5.3)
SODIUM SERPL-SCNC: 137 MMOL/L (ref 133–144)

## 2020-10-15 PROCEDURE — 97110 THERAPEUTIC EXERCISES: CPT | Mod: GO | Performed by: OCCUPATIONAL THERAPIST

## 2020-10-15 PROCEDURE — 97530 THERAPEUTIC ACTIVITIES: CPT | Mod: GO | Performed by: OCCUPATIONAL THERAPIST

## 2020-10-15 PROCEDURE — 80048 BASIC METABOLIC PNL TOTAL CA: CPT | Performed by: HOSPITALIST

## 2020-10-15 PROCEDURE — 250N000013 HC RX MED GY IP 250 OP 250 PS 637: Performed by: HOSPITALIST

## 2020-10-15 PROCEDURE — 250N000013 HC RX MED GY IP 250 OP 250 PS 637: Performed by: INTERNAL MEDICINE

## 2020-10-15 PROCEDURE — 97530 THERAPEUTIC ACTIVITIES: CPT | Mod: GP

## 2020-10-15 PROCEDURE — 85018 HEMOGLOBIN: CPT | Performed by: HOSPITALIST

## 2020-10-15 PROCEDURE — 120N000001 HC R&B MED SURG/OB

## 2020-10-15 PROCEDURE — 999N000157 HC STATISTIC RCP TIME EA 10 MIN

## 2020-10-15 PROCEDURE — 94640 AIRWAY INHALATION TREATMENT: CPT

## 2020-10-15 PROCEDURE — 999N001017 HC STATISTIC GLUCOSE BY METER IP

## 2020-10-15 PROCEDURE — 99232 SBSQ HOSP IP/OBS MODERATE 35: CPT | Performed by: INTERNAL MEDICINE

## 2020-10-15 PROCEDURE — 97140 MANUAL THERAPY 1/> REGIONS: CPT | Mod: GP

## 2020-10-15 PROCEDURE — 250N000013 HC RX MED GY IP 250 OP 250 PS 637: Performed by: NURSE PRACTITIONER

## 2020-10-15 RX ORDER — METOPROLOL TARTRATE 25 MG/1
25 TABLET, FILM COATED ORAL 2 TIMES DAILY
Status: DISCONTINUED | OUTPATIENT
Start: 2020-10-15 | End: 2020-10-21 | Stop reason: HOSPADM

## 2020-10-15 RX ORDER — FERROUS SULFATE 325(65) MG
325 TABLET ORAL 2 TIMES DAILY WITH MEALS
Status: DISCONTINUED | OUTPATIENT
Start: 2020-10-15 | End: 2020-10-21 | Stop reason: HOSPADM

## 2020-10-15 RX ADMIN — SIMVASTATIN 10 MG: 10 TABLET, FILM COATED ORAL at 21:49

## 2020-10-15 RX ADMIN — Medication 5 MG: at 09:27

## 2020-10-15 RX ADMIN — ACETAMINOPHEN 975 MG: 325 TABLET, FILM COATED ORAL at 21:49

## 2020-10-15 RX ADMIN — CETIRIZINE HYDROCHLORIDE 10 MG: 10 TABLET, FILM COATED ORAL at 09:27

## 2020-10-15 RX ADMIN — GABAPENTIN 200 MG: 100 CAPSULE ORAL at 21:49

## 2020-10-15 RX ADMIN — FLUTICASONE FUROATE 1 PUFF: 200 POWDER RESPIRATORY (INHALATION) at 07:54

## 2020-10-15 RX ADMIN — METOPROLOL TARTRATE 25 MG: 25 TABLET, FILM COATED ORAL at 09:27

## 2020-10-15 RX ADMIN — ACETAMINOPHEN 975 MG: 325 TABLET, FILM COATED ORAL at 09:26

## 2020-10-15 RX ADMIN — DIGOXIN 125 MCG: 125 TABLET ORAL at 09:26

## 2020-10-15 RX ADMIN — PANTOPRAZOLE SODIUM 40 MG: 40 TABLET, DELAYED RELEASE ORAL at 06:13

## 2020-10-15 RX ADMIN — ACETAMINOPHEN 975 MG: 325 TABLET, FILM COATED ORAL at 17:19

## 2020-10-15 RX ADMIN — APIXABAN 5 MG: 5 TABLET, FILM COATED ORAL at 09:27

## 2020-10-15 RX ADMIN — ASPIRIN 81 MG: 81 TABLET, COATED ORAL at 09:27

## 2020-10-15 RX ADMIN — APIXABAN 5 MG: 5 TABLET, FILM COATED ORAL at 21:45

## 2020-10-15 RX ADMIN — GABAPENTIN 100 MG: 100 CAPSULE ORAL at 09:27

## 2020-10-15 RX ADMIN — POLYETHYLENE GLYCOL 3350 17 G: 17 POWDER, FOR SOLUTION ORAL at 09:31

## 2020-10-15 RX ADMIN — METOPROLOL TARTRATE 25 MG: 25 TABLET, FILM COATED ORAL at 21:45

## 2020-10-15 RX ADMIN — Medication 5 MG: at 21:49

## 2020-10-15 RX ADMIN — FERROUS SULFATE TAB 325 MG (65 MG ELEMENTAL FE) 325 MG: 325 (65 FE) TAB at 17:19

## 2020-10-15 ASSESSMENT — ACTIVITIES OF DAILY LIVING (ADL)
ADLS_ACUITY_SCORE: 21

## 2020-10-15 NOTE — PLAN OF CARE
Vitals: VSS. Afebrile. Denies pain.   Labs:  and 175.   Neuro: A&O x4. Calm, cooperative.   Respiratory: Lung sounds diminished. SOB at times. Nonproductive cough.   Cardiac: Apical pulse irregular. Generalized edema. Lymph wraps in place.   GI/: Purewick in place. No BMs this shift.   Skin: Multiple skin issues. Wound care completed per orders.   LDAs: PICC to left arm SL  Diet: Renal   Activity: A4, lift   Teaching: POC reviewed with pt. Questions asked and answered.   Plan: Continue with POC.

## 2020-10-15 NOTE — PROGRESS NOTES
[facility]    Hospitalist Progress Note      Assessment & Plan   Waleska Marie is a 75 year old female who was admitted on 10/8/2020.    Summary of Stay:   Waleska Marie is a 75 year old female with a history of stage III CKD, type 2 diabetes mellitus, atrial fibrillation, severe lymphedema and morbid obesity, COPD, asthma, and mantle cell lymphoma admitted on 10/8/2020 with non-anion gap metabolic acidosis, acute kidney injury, and severe hyperkalemia refractory to medications.  Presented to an outside facility with creatinine of 2.75, potassium 7.9, and bicarb 12 with elevated anion gap.  Most recent baseline renal function shows creatinine of 1.3.  Prior to admission on lisinopril, Lasix, and metformin as well as potassium supplementation 20 mEq twice daily.  Treated with insulin, D50, IV Lasix, normal saline, albuterol nebulizer, and calcium gluconate with no significant improvement in potassium.  Transferred to our facility and nephrology consulted.       Received emergent dialysis on 10/9 with temporary dialysis catheter placed.  Only received 1 dialysis session with subsequent improvement in hyperkalemia and renal function and acidosis.  Also found to have acute on chronic anemia with hemoglobin dropped to 5.8 recovering with 2 unit of packed red blood cells.  Had dialysis catheter removed by interventional radiology on 10/13.  She is clinically improved.  Suspect given generalized weakness may need TCU.    Plan:    #Acute kidney injury, anion gap metabolic acidosis, and severe hyperkalemia:   Etiology likely related to prior to admission lisinopril and potassium supplements.  Nephrology consulted and renal function recovered and resolved hyperkalemia with emergent dialysis.    Dialysis catheter was removed on 10/13.  continue to hold her prior to admission lisinopril, Lasix, metformin, and potassium supplement.  Potentially resume these meds, except potassium, in the near  future with close lab monitoring     #Possible urinary tract infection:   Grossly abnormal urinalysis.  Received 6 days of ceftriaxone therapy- stopped.   -davis is removed      #Obesity and severe lymphedema:   At baseline ambulates with a walker. Lymphedema consult, PT/OT.  Resume lasix in the near future.     #Acute on chronic normocytic anemia:   -Baseline hemoglobin about 8-9 from 2017.  Drop in hemoglobin to 5.8 and recovered with 2 units of packed red blood cells.  Continue empiric PPI but no evidence of active bleeding.  Hemoglobin has been stable in low 7's.    -Continue to monitor.  Transfuse for Hgb < 7.0  - Gastritis was seen during EUS, in May. Neg H pylori  -Last colonoscopy in 2012.  Results not available.  Recommended to have a follow-up with primary care physician regarding this.  -Iron profile done during this admission: Ferritin was only 14, with iron saturation index of 7.  Suggestive of iron deficiency anemia in the setting of anemia of chronic disease.  -Per patient, she used to take iron in the past but stopped taking it.  -Recommended oral iron supplement,  Started     #Atrial fibrillation:   -Chronically on Eliquis.  We will continue for now with relatively stable hemoglobin.  Continue digoxin, level 1.2.  -Resume oral metoprolol.    #Hypertension:   -Resume oral metoprolol.  -Holding lisinopril for now.    #HL: Continue statin  #GERD: Continue PPI  #Mantle cell lymphoma: Continuing Imbruvica.    #Type 2 diabetes mellitus:   -Prior to admission on Lantus 35 units at bedtime with metformin 500 mg twice daily and Actos.  Was initially treated with an insulin drip and IV fluids.    -Pt has not needed any scheduled insulin therapy and received sparse sliding scale insulin.  Likely hold medications on discharge and follow-up with PCP.     DVT Prophylaxis: Eliquis.  Code Status: Full Code  Disposition: To TCU when bed is available.    Rudy Mccracken MD  Text Page (7am - 6pm, M-F)    Interval History    Patient was evaluated with nursing staff. Overnight issues discussed.    Review of systems:  No nausea or vomiting.  No abdominal pain.  No diarrhea.  No chest pain/palpitations.  No new cough/shortness of breath.  No headache/visual disturbance/new weakness.    -Data reviewed today: Labs and medications.    Physical Exam   Temp: 97.7  F (36.5  C) Temp src: Oral BP: 134/65 Pulse: 73   Resp: 18 SpO2: 97 % O2 Device: None (Room air)    Vitals:    10/08/20 2300 10/10/20 0530   Weight: 138.2 kg (304 lb 10.8 oz) 140 kg (308 lb 10.3 oz)     Vital Signs with Ranges  Temp:  [97.7  F (36.5  C)-98.2  F (36.8  C)] 97.7  F (36.5  C)  Pulse:  [73-80] 73  Resp:  [18] 18  BP: (130-154)/(40-71) 134/65  SpO2:  [94 %-97 %] 97 %  I/O last 3 completed shifts:  In: 723 [P.O.:720; I.V.:3]  Out: 1400 [Urine:1400]    Constitutional: Awake, alert, cooperative, no apparent distress  HEENT: Trachea midline, sclera is clear   Respiratory: No crackles. No wheezing. Equal breath sounds bilaterally.  Cardiovascular: Regular rate and rhythm, normal S1 and S2, and no murmur noted  Skin/Integumen: No rashes, no cyanosis  Psych: appropriate affect, no agitation   Extremities: Bilateral lower extremity edema, lymphedema wraps in place.    Medications     dextrose       sodium chloride 10 mL/hr at 10/10/20 0855       acetaminophen  975 mg Oral TID     apixaban ANTICOAGULANT  5 mg Oral BID     aspirin  81 mg Oral Daily     cetirizine  10 mg Oral Daily     digoxin  125 mcg Oral Daily     ferrous sulfate  325 mg Oral BID w/meals     fluticasone  1 puff Inhalation Daily     gabapentin  100 mg Oral Daily     gabapentin  200 mg Oral At Bedtime     ibrutinib  560 mg Oral At Bedtime     insulin aspart  1-7 Units Subcutaneous TID AC     insulin aspart  1-5 Units Subcutaneous At Bedtime     metoprolol tartrate  25 mg Oral BID     oxybutynin  5 mg Oral BID     pantoprazole  40 mg Oral QAM AC     polyethylene glycol  17 g Oral Daily     simvastatin  10 mg Oral  At Bedtime     sodium chloride (PF)  10 mL Intracatheter Q8H       Data   Recent Labs   Lab 10/15/20  0545 10/14/20  0535 10/13/20  0554 10/12/20  0641 10/10/20  1730 10/10/20  1730 10/09/20  2037 10/09/20  2037 10/09/20  1315 10/09/20  1315 10/08/20  2043 10/08/20  2043   WBC  --  8.2 7.2 6.9   < >  --    < >  --   --   --    < > 11.3*   HGB 7.5* 7.3* 7.1* 7.6*   < >  --    < >  --    < > 5.8*   < > 7.2*   MCV  --  95 95 94   < >  --    < >  --   --   --    < > 97   PLT  --  298 307 322   < >  --    < > 316  --   --    < > 431   INR  --   --   --   --   --   --   --  1.29*  --   --   --   --     136 138 136   < > 135   < >  --   --  137   < > 133   POTASSIUM 4.3 4.3 4.5 4.4   < > 5.1   < >  --   --  4.5   < > 7.5*   CHLORIDE 104 105 107 106   < > 104   < >  --   --  105   < > 112*   CO2 29 27 27 26   < > 24   < >  --   --  24   < > 13*   BUN 19 22 30 39*   < > 49*   < >  --   --  45*   < > 80*   CR 0.96 1.04 1.14* 1.31*   < > 2.09*   < >  --   --  2.13*   < > 3.14*   ANIONGAP 4 4 4 4   < > 7   < >  --   --  8   < > 8   LEE 8.1* 7.9* 7.7* 8.0*   < > 7.5*   < >  --   --  7.3*   < > 8.4*   * 110* 109* 99   < > 105*   < >  --   --  88   < > 90   ALBUMIN  --   --   --   --   --  2.3*  --   --   --  2.1*  --  2.5*   PROTTOTAL  --   --   --   --   --   --   --   --   --   --   --  6.5*   BILITOTAL  --   --   --   --   --   --   --   --   --   --   --  0.3   ALKPHOS  --   --   --   --   --   --   --   --   --   --   --  97   ALT  --   --   --   --   --   --   --   --   --   --   --  13   AST  --   --   --   --   --   --   --   --   --   --   --  12   TROPI  --   --   --   --   --   --   --   --   --   --   --  <0.015    < > = values in this interval not displayed.       No results found for this or any previous visit (from the past 24 hour(s)).

## 2020-10-15 NOTE — PLAN OF CARE
Patient VSS  Denied pain  Skin integrity is impaired, on beri air matress bed that is programed to repo patient side to side q 30 min  Pillows adjusted/ additional weight shifting assistance provided using lift and Ax3 as tolerated, in addition to benito cares  Purewick in place, I/O recorded see flowsheet  no BM this shift, patient passing gas frequently  Mepilexes intact to buttock and posterior and inner thighs  BLE lymph wraps intact  Labs drawn without difficulty  Safety maintained

## 2020-10-15 NOTE — PROGRESS NOTES
A&OX4. Ls diminished. VSS stable. Up with assist of 3 with mechanical lift. +2 to 3 BLE edema. Had BM tonight. Renal diet. Wound care to coccyx completed. Kramer discontinued. Purwick applied. Not voided yet. Continue monitoring.

## 2020-10-15 NOTE — PROGRESS NOTES
Brief note:    Sw continuing to follow for discharge planning to TCU. VM left at [facility], awaiting return call. [facility] declined due to bed availability. Additional referrals sent to [facility] and [facility]. Will continue to follow.     NAE Chu, [facility]   Inpatient Care Coordination  [facility]   [phone]

## 2020-10-16 ENCOUNTER — APPOINTMENT (OUTPATIENT)
Dept: PHYSICAL THERAPY | Facility: CLINIC | Age: 75
DRG: 682 | End: 2020-10-16
Attending: INTERNAL MEDICINE
Payer: MEDICARE

## 2020-10-16 LAB
GLUCOSE BLDC GLUCOMTR-MCNC: 109 MG/DL (ref 70–99)
GLUCOSE BLDC GLUCOMTR-MCNC: 114 MG/DL (ref 70–99)
GLUCOSE BLDC GLUCOMTR-MCNC: 142 MG/DL (ref 70–99)
GLUCOSE BLDC GLUCOMTR-MCNC: 183 MG/DL (ref 70–99)
GLUCOSE BLDC GLUCOMTR-MCNC: 202 MG/DL (ref 70–99)

## 2020-10-16 PROCEDURE — 250N000013 HC RX MED GY IP 250 OP 250 PS 637: Performed by: INTERNAL MEDICINE

## 2020-10-16 PROCEDURE — 250N000013 HC RX MED GY IP 250 OP 250 PS 637: Performed by: NURSE PRACTITIONER

## 2020-10-16 PROCEDURE — G0463 HOSPITAL OUTPT CLINIC VISIT: HCPCS

## 2020-10-16 PROCEDURE — 120N000001 HC R&B MED SURG/OB

## 2020-10-16 PROCEDURE — 97530 THERAPEUTIC ACTIVITIES: CPT | Mod: GP

## 2020-10-16 PROCEDURE — 99207 PR NO CHARGE LOS: CPT | Performed by: NURSE PRACTITIONER

## 2020-10-16 PROCEDURE — 97140 MANUAL THERAPY 1/> REGIONS: CPT | Mod: GP

## 2020-10-16 PROCEDURE — 999N001017 HC STATISTIC GLUCOSE BY METER IP

## 2020-10-16 PROCEDURE — 99232 SBSQ HOSP IP/OBS MODERATE 35: CPT | Performed by: INTERNAL MEDICINE

## 2020-10-16 PROCEDURE — 250N000013 HC RX MED GY IP 250 OP 250 PS 637: Performed by: HOSPITALIST

## 2020-10-16 RX ORDER — FUROSEMIDE 40 MG
40 TABLET ORAL DAILY
Status: DISCONTINUED | OUTPATIENT
Start: 2020-10-16 | End: 2020-10-17

## 2020-10-16 RX ADMIN — Medication 5 MG: at 08:23

## 2020-10-16 RX ADMIN — ACETAMINOPHEN 975 MG: 325 TABLET, FILM COATED ORAL at 08:24

## 2020-10-16 RX ADMIN — METOPROLOL TARTRATE 25 MG: 25 TABLET, FILM COATED ORAL at 21:35

## 2020-10-16 RX ADMIN — FERROUS SULFATE TAB 325 MG (65 MG ELEMENTAL FE) 325 MG: 325 (65 FE) TAB at 17:07

## 2020-10-16 RX ADMIN — Medication 5 MG: at 21:36

## 2020-10-16 RX ADMIN — DIGOXIN 125 MCG: 125 TABLET ORAL at 08:23

## 2020-10-16 RX ADMIN — METOPROLOL TARTRATE 25 MG: 25 TABLET, FILM COATED ORAL at 08:23

## 2020-10-16 RX ADMIN — ASPIRIN 81 MG: 81 TABLET, COATED ORAL at 08:24

## 2020-10-16 RX ADMIN — GABAPENTIN 100 MG: 100 CAPSULE ORAL at 08:23

## 2020-10-16 RX ADMIN — POLYETHYLENE GLYCOL 3350 17 G: 17 POWDER, FOR SOLUTION ORAL at 08:21

## 2020-10-16 RX ADMIN — GABAPENTIN 200 MG: 100 CAPSULE ORAL at 21:35

## 2020-10-16 RX ADMIN — ACETAMINOPHEN 975 MG: 325 TABLET, FILM COATED ORAL at 16:11

## 2020-10-16 RX ADMIN — CETIRIZINE HYDROCHLORIDE 10 MG: 10 TABLET, FILM COATED ORAL at 08:23

## 2020-10-16 RX ADMIN — FERROUS SULFATE TAB 325 MG (65 MG ELEMENTAL FE) 325 MG: 325 (65 FE) TAB at 08:23

## 2020-10-16 RX ADMIN — SIMVASTATIN 10 MG: 10 TABLET, FILM COATED ORAL at 21:36

## 2020-10-16 RX ADMIN — APIXABAN 5 MG: 5 TABLET, FILM COATED ORAL at 21:36

## 2020-10-16 RX ADMIN — APIXABAN 5 MG: 5 TABLET, FILM COATED ORAL at 08:23

## 2020-10-16 RX ADMIN — PANTOPRAZOLE SODIUM 40 MG: 40 TABLET, DELAYED RELEASE ORAL at 06:30

## 2020-10-16 RX ADMIN — FUROSEMIDE 40 MG: 40 TABLET ORAL at 16:11

## 2020-10-16 RX ADMIN — ACETAMINOPHEN 975 MG: 325 TABLET, FILM COATED ORAL at 21:35

## 2020-10-16 ASSESSMENT — MIFFLIN-ST. JEOR: SCORE: 2120.25

## 2020-10-16 ASSESSMENT — ACTIVITIES OF DAILY LIVING (ADL)
ADLS_ACUITY_SCORE: 21

## 2020-10-16 NOTE — PLAN OF CARE
A&Ox4. VSS on RA. Denies pain. Purewick in place overnight w/ good output. Lymphedema wraps to bilateral lower extremities; continues to have +2 to +3 edema in legs. PICC to LUE flushes well. Air mattress on automatic Q30 minute right to left turns. . OT/PT, WOC, and palliative following. Discharge pending TCU placement. Will continue with plan of care.

## 2020-10-16 NOTE — PROGRESS NOTES
[facility] Nurse Inpatient Pressure Injury Assessment   Reason for consultation: Evaluate and treat Bilateral ischium and coccyx pressure injury, bilateral legs      ASSESSMENT  Pressure Injury: on coccyx and bilateral ischium , present on admission ,   Pressure Injury is Stage 3-was a stage 3 last visit as well, did not notice presence of scar tissue during previous visit but this is clearly not new   Contributing factor of the pressure injury: pressure, immobility and moisture  Status: initial assessment  Recommend provider order: None, at this time      LEwounds due to Lymphedema  Status: improving     TREATMENT PLAN  Coccyx and bilateral ischium wounds: Every 3 days  Orders Reviewed   Mepilex sacral to coccyx  Mepilex 4x4 to bilateral IT  WO Nurse follow-up plan:weekly  Nursing to notify the Provider(s) and re-consult the WO Nurse if wound(s) deteriorates or new skin concern.    LE wounds: Daily :     Davy LE'S  1. Cleanse with wound cleanser, dry  2. Apply sween to moisturize legs  2. Adaptic to any open or blistered areas  3. Kerlix wrap to secure  4. Air flow chux under neath  5. Lymphedema wraps to follow   Patient History  According to provider note(s):  Patient is 75-year-old female who presented to the [facility] with worsening of her chronic kidney disease, and hyperkalemia and severe metabolic acidosis.    Objective Data  Containment of urine/stool: Indwelling catheter    Current Diet/ Nutrition:  Orders Placed This Encounter      Renal Diet (non-dialysis)      Output:   I/O last 3 completed shifts:  In: 670 [P.O.:660; I.V.:10]  Out: 1850 [Urine:1850]    Risk Assessment:   Sensory Perception: 3-->slightly limited  Moisture: 2-->very moist  Activity: 2-->chairfast  Mobility: 2-->very limited  Nutrition: 3-->adequate  Friction and Shear: 1-->problem  Mario Score: 13      Labs:   Recent Labs   Lab 10/15/20  0545 10/14/20  0535 10/10/20  1730 10/10/20  1730 10/09/20  2037 10/09/20  2037   ALBUMIN  --    --   --  2.3*  --   --    HGB 7.5* 7.3*   < >  --    < >  --    INR  --   --   --   --   --  1.29*   WBC  --  8.2   < >  --    < >  --     < > = values in this interval not displayed.       Physical Exam  Skin inspection: focused coccyx and bilateral ischium  Patient is high risk for pressure injury development secondary to history of pressure injury    Wound Location:  Coccyx to bilateral buttocks  Date of last Photo 10/13/20  Wound History: Patient reports wounds have come and gone over multiple years.  Reports she uses a cream (but can't recall what and it usually heals up).  Wound does tend to bleed easily.  Measurements (length x width x depth, in cm) 6 cm x 3 cm area of scattered agranular tissue and fibrin  Wound Base:  Mix of agranular tissue and fibrin drainage  Palpation of the wound bed: normal   Periwound skin: hyperpigmentation from previous open areas  Color: pink and purple  Temperature: normal   Drainage:, small  Description of drainage: serosanguinous  Odor: none  Pain: moderate, aching       Wound Location:  Bilateral IT  Date of last Photo 10/13/20  Wound History: Patient reports wounds have come and gone over multiple years.  Reports she uses a cream (but can't recall what and it usually heals up).  Measurements (length x width x depth, in cm) Left 1 cm x 2.5 cm, Right with 2 areas each measuring 0.3 cm x 0.3 cm   Wound Base:  100 % agranular  Palpation of the wound bed: normal   Periwound skin: hyperpigmentation  Color: normal and consistent with surrounding tissue  Temperature: normal   Drainage:, small  Description of drainage: serosanguinous  Odor: none  Pain: absent, none    Wound Location:  Davy LE's  Date of last photo 10/13  Wound History: chronic, states she was to start at a new Wound clinic from [facility].  She states she has had a Unna Boot in the past and compression wraps at home         Wound Base: Right shin with 3x5cm granular area.  Right calf with pinpoint agranular areas.  Right  great toe with 2x1cm area of dried drainage.  Left leg intact with dry/flaking skin.     Palpation of the wound bed: firm      Drainage: moderate     Description of drainage: serous     Tunneling N/A     Undermining N/A  Periwound skin: dry/scaly, erythema- blanchable, hemosiderin staining and superficial erosion   Temperature: warm  Edema 2-3+  Nails thickened and discolored  States has sensation to feet  Scaling skin  Odor: none  Pain: moderate with movement    Interventions  Current support surface: Bariatric Low air loss mattress  Current off-loading measures: Pillows  Repositioning aid: Pillows  Visual inspection of wound(s) completed   Wound Care: was done per plan of care.  Supplies: at bedside, discussed with RN and discussed with patient  Educated provided: wound progress and Off-loading pressure  Education provided to: patient  and nurse  Discussed importance of:repositioning every 2 hours  Discussed plan of care with Patient and Nurse    Berry Jeff RN CWOCN

## 2020-10-16 NOTE — PLAN OF CARE
VSS. No tele. A/O X4. ,. Pt denies SOB, CP, pain. Pt has intermittent numbness and tingling in hands and feet. Wound care completed by wound nurse today, dressings clean dry, intact. Wounds looking better, bilat LE cedric, edema 2+,3+.Pure wick in place, changed during shift. PICC L arm, saline locked, caps changed. PT following. Assist of 3, with lift. Continue to monitor and POC.

## 2020-10-16 NOTE — PROGRESS NOTES
[facility]    Palliative Care Chart Check or Chart Review    This patient's H+P, most recent hospitalist note, most recent consultant notes and medication profile have been reviewed    Recommendation: continue with current plan. No new changes, awaiting TCU placement.  It has been determined that no change is necessary to the current plan of care at this time.   The chart will be reviewed regularly and the patient will be seen if necessary.   If you would like the patient to be seen, please contact the service at [phone] and ask to have the patient seen.  Time Spent 5 minutes, none in direct contact with patient or family.    Thank you!    Corina Bahena   Pain Management and Palliative Care  [facility]  Pgr: [phone]

## 2020-10-16 NOTE — PROGRESS NOTES
Care Management Follow Up Note    Length of Stay (days) 8    Patient plan of care discussed at Interdisciplinary Rounds: yes  Expected Discharge Date: 10/16/20  Concerns to be Addressed:  All concerns addressed this encounter.    Anticipated Discharge Disposition:  TCU.  Anticipated Discharge Services:  Therapies.  Anticipated Discharge DME:  Unknown.    Plan:  Sw received a call from Gaby at [facility] P: [phone], who said that they are unable to accept the pt due to bed availability.  They will not have any beds available until mid next week.    Bhavya will continue with discharge planning and will be available as needed until discharge.    NAE Perez, [facility]  Inpatient Care Coordination  [facility]  [phone]

## 2020-10-16 NOTE — PROGRESS NOTES
A&OX4. Ls diminished to ISABEL, VSS stable. UP with assist of 3 on mechanical lift. Renal diet. Wound care competed to benito and posterior lower thigh. Purwick intact and draining straw color urine. PICC to Left upper arm. Continue POC

## 2020-10-16 NOTE — PROGRESS NOTES
[facility]    Hospitalist Progress Note      Assessment & Plan   Waleska Marie is a 75 year old female who was admitted on 10/8/2020.    Summary of Stay:   Waleska Marie is a 75 year old female with a history of stage III CKD, type 2 diabetes mellitus, atrial fibrillation, severe lymphedema and morbid obesity, COPD, asthma, and mantle cell lymphoma admitted on 10/8/2020 with non-anion gap metabolic acidosis, acute kidney injury, and severe hyperkalemia refractory to medications.  Presented to an outside facility with creatinine of 2.75, potassium 7.9, and bicarb 12 with elevated anion gap.  Most recent baseline renal function shows creatinine of 1.3.  Prior to admission on lisinopril, Lasix, and metformin as well as potassium supplementation 20 mEq twice daily.  Treated with insulin, D50, IV Lasix, normal saline, albuterol nebulizer, and calcium gluconate with no significant improvement in potassium.  Transferred to our facility and nephrology consulted.       Received emergent dialysis on 10/9 with temporary dialysis catheter placed.  Only received 1 dialysis session with subsequent improvement in hyperkalemia and renal function and acidosis.  Also found to have acute on chronic anemia with hemoglobin dropped to 5.8 recovering with 2 unit of packed red blood cells.  Had dialysis catheter removed by interventional radiology on 10/13.  She is clinically improved.  Suspect given generalized weakness may need TCU.    Plan:    #Acute kidney injury, anion gap metabolic acidosis, and severe hyperkalemia:   Etiology likely related to prior to admission lisinopril and potassium supplements.  Nephrology consulted and renal function recovered and resolved hyperkalemia with emergent dialysis.    Dialysis catheter was removed on 10/13.  continue to hold her prior to admission lisinopril, Lasix, metformin, and potassium supplement.  Potentially resume these meds, except potassium, in the near  future with close lab monitoring  Start lasix 40mg today (home dose 80mg), repeat BMP in AM     #Acute on chronic normocytic anemia:   -Baseline hemoglobin about 8-9 from 2017.  Drop in hemoglobin to 5.8 and recovered with 2 units of packed red blood cells.  Continue empiric PPI but no evidence of active bleeding.  Hemoglobin has been stable in low 7's.    -Transfuse for Hgb < 7.0  - Gastritis was seen during EUS, in May. Neg H pylori  -Last colonoscopy in 2012.  Results not available.  Recommended to have a follow-up with primary care physician regarding this. needs another colonoscopy in the near future.  -Iron profile done during this admission: Ferritin was only 14, with iron saturation index of 7.  Suggestive of iron deficiency anemia in the setting of anemia of chronic disease.  -Per patient, she used to take iron in the past but stopped taking it.  -started oral iron supplement     #Atrial fibrillation:   -Chronically on Eliquis.  We will continue for now with relatively stable hemoglobin.  Continue digoxin, level 1.2.  -Resumed oral metoprolol.     #Possible urinary tract infection:   Grossly abnormal urinalysis.  Received 6 days of ceftriaxone therapy- stopped.   -davis is removed      #Obesity and severe lymphedema:   At baseline ambulates with a walker. Lymphedema consult, PT/OT.  START LASIX TODAY     #Hypertension:   -Resumed oral metoprolol.  -Holding lisinopril for now.     #HL: Continue statin  #GERD: Continue PPI  #Mantle cell lymphoma: Continuing Imbruvica.     #Type 2 diabetes mellitus:   -Prior to admission on Lantus 35 units at bedtime with metformin 500 mg twice daily and Actos.  Was initially treated with an insulin drip and IV fluids.    -Pt has not needed any scheduled insulin therapy and received sparse sliding scale insulin.  Likely hold medications on discharge and follow-up with PCP.     DVT Prophylaxis: Eliquis.  Code Status: Full Code  Disposition: To TCU when bed is available.  Medically stable for discharge    Rudy Mccracken MD  [facility] (7am - 6pm, M-F)    Interval History   Patient was evaluated with nursing staff. Overnight issues discussed.    Review of systems:  No nausea or vomiting.  No abdominal pain.  No diarrhea.  No chest pain/palpitations.  No new cough/shortness of breath.  No headache/visual disturbance/new weakness.    -Data reviewed today: Labs and medications.    Physical Exam   Temp: 97.9  F (36.6  C) Temp src: Oral BP: 126/61(in sitting) Pulse: 64   Resp: 18 SpO2: 95 % O2 Device: None (Room air)    Vitals:    10/08/20 2300 10/10/20 0530 10/16/20 0632   Weight: 138.2 kg (304 lb 10.8 oz) 140 kg (308 lb 10.3 oz) (!) 154 kg (339 lb 8.1 oz)     Vital Signs with Ranges  Temp:  [97.9  F (36.6  C)-98.2  F (36.8  C)] 97.9  F (36.6  C)  Pulse:  [64-81] 64  Resp:  [18-20] 18  BP: (116-139)/(41-66) 126/61  SpO2:  [94 %-97 %] 95 %  I/O last 3 completed shifts:  In: 670 [P.O.:660; I.V.:10]  Out: 1850 [Urine:1850]    Constitutional: Awake, alert, cooperative, no apparent distress  HEENT: Trachea midline, sclera is clear   Respiratory: No crackles. No wheezing. Equal breath sounds bilaterally.  Cardiovascular: Regular rate and rhythm, normal S1 and S2, and no murmur noted  GI: Normal bowel sounds, soft, non-distended, non-tender  Extremities:  edema     Medications     dextrose       sodium chloride 10 mL/hr at 10/10/20 0855       acetaminophen  975 mg Oral TID     apixaban ANTICOAGULANT  5 mg Oral BID     aspirin  81 mg Oral Daily     cetirizine  10 mg Oral Daily     digoxin  125 mcg Oral Daily     ferrous sulfate  325 mg Oral BID w/meals     fluticasone  1 puff Inhalation Daily     furosemide  40 mg Oral Daily     gabapentin  100 mg Oral Daily     gabapentin  200 mg Oral At Bedtime     ibrutinib  560 mg Oral At Bedtime     insulin aspart  1-7 Units Subcutaneous TID AC     insulin aspart  1-5 Units Subcutaneous At Bedtime     metoprolol tartrate  25 mg Oral BID     oxybutynin  5 mg  Oral BID     pantoprazole  40 mg Oral QAM AC     polyethylene glycol  17 g Oral Daily     simvastatin  10 mg Oral At Bedtime     sodium chloride (PF)  10 mL Intracatheter Q8H       Data   Recent Labs   Lab 10/15/20  0545 10/14/20  0535 10/13/20  0554 10/12/20  0641 10/10/20  1730 10/10/20  1730 10/09/20  2037 10/09/20  2037   WBC  --  8.2 7.2 6.9   < >  --    < >  --    HGB 7.5* 7.3* 7.1* 7.6*   < >  --    < >  --    MCV  --  95 95 94   < >  --    < >  --    PLT  --  298 307 322   < >  --    < > 316   INR  --   --   --   --   --   --   --  1.29*    136 138 136   < > 135   < >  --    POTASSIUM 4.3 4.3 4.5 4.4   < > 5.1   < >  --    CHLORIDE 104 105 107 106   < > 104   < >  --    CO2 29 27 27 26   < > 24   < >  --    BUN 19 22 30 39*   < > 49*   < >  --    CR 0.96 1.04 1.14* 1.31*   < > 2.09*   < >  --    ANIONGAP 4 4 4 4   < > 7   < >  --    LEE 8.1* 7.9* 7.7* 8.0*   < > 7.5*   < >  --    * 110* 109* 99   < > 105*   < >  --    ALBUMIN  --   --   --   --   --  2.3*  --   --     < > = values in this interval not displayed.       No results found for this or any previous visit (from the past 24 hour(s)).

## 2020-10-17 ENCOUNTER — APPOINTMENT (OUTPATIENT)
Dept: PHYSICAL THERAPY | Facility: CLINIC | Age: 75
DRG: 682 | End: 2020-10-17
Attending: INTERNAL MEDICINE
Payer: MEDICARE

## 2020-10-17 ENCOUNTER — APPOINTMENT (OUTPATIENT)
Dept: OCCUPATIONAL THERAPY | Facility: CLINIC | Age: 75
DRG: 682 | End: 2020-10-17
Attending: INTERNAL MEDICINE
Payer: MEDICARE

## 2020-10-17 LAB
ANION GAP SERPL CALCULATED.3IONS-SCNC: 5 MMOL/L (ref 3–14)
BUN SERPL-MCNC: 16 MG/DL (ref 7–30)
CALCIUM SERPL-MCNC: 7.6 MG/DL (ref 8.5–10.1)
CHLORIDE SERPL-SCNC: 104 MMOL/L (ref 94–109)
CO2 SERPL-SCNC: 28 MMOL/L (ref 20–32)
CREAT SERPL-MCNC: 1.04 MG/DL (ref 0.52–1.04)
GFR SERPL CREATININE-BSD FRML MDRD: 53 ML/MIN/{1.73_M2}
GLUCOSE BLDC GLUCOMTR-MCNC: 119 MG/DL (ref 70–99)
GLUCOSE BLDC GLUCOMTR-MCNC: 137 MG/DL (ref 70–99)
GLUCOSE BLDC GLUCOMTR-MCNC: 167 MG/DL (ref 70–99)
GLUCOSE BLDC GLUCOMTR-MCNC: 177 MG/DL (ref 70–99)
GLUCOSE SERPL-MCNC: 114 MG/DL (ref 70–99)
POTASSIUM SERPL-SCNC: 3.8 MMOL/L (ref 3.4–5.3)
SODIUM SERPL-SCNC: 137 MMOL/L (ref 133–144)

## 2020-10-17 PROCEDURE — 250N000013 HC RX MED GY IP 250 OP 250 PS 637: Performed by: NURSE PRACTITIONER

## 2020-10-17 PROCEDURE — 250N000013 HC RX MED GY IP 250 OP 250 PS 637: Performed by: HOSPITALIST

## 2020-10-17 PROCEDURE — 97110 THERAPEUTIC EXERCISES: CPT | Mod: GO

## 2020-10-17 PROCEDURE — 94640 AIRWAY INHALATION TREATMENT: CPT

## 2020-10-17 PROCEDURE — 250N000013 HC RX MED GY IP 250 OP 250 PS 637: Performed by: INTERNAL MEDICINE

## 2020-10-17 PROCEDURE — 80048 BASIC METABOLIC PNL TOTAL CA: CPT | Performed by: INTERNAL MEDICINE

## 2020-10-17 PROCEDURE — 999N000157 HC STATISTIC RCP TIME EA 10 MIN

## 2020-10-17 PROCEDURE — 97140 MANUAL THERAPY 1/> REGIONS: CPT | Mod: GP | Performed by: PHYSICAL THERAPIST

## 2020-10-17 PROCEDURE — 99232 SBSQ HOSP IP/OBS MODERATE 35: CPT | Performed by: INTERNAL MEDICINE

## 2020-10-17 PROCEDURE — 97110 THERAPEUTIC EXERCISES: CPT | Mod: GP | Performed by: PHYSICAL THERAPIST

## 2020-10-17 PROCEDURE — 999N001017 HC STATISTIC GLUCOSE BY METER IP

## 2020-10-17 PROCEDURE — 120N000001 HC R&B MED SURG/OB

## 2020-10-17 RX ORDER — FUROSEMIDE 40 MG
40 TABLET ORAL
Status: DISCONTINUED | OUTPATIENT
Start: 2020-10-18 | End: 2020-10-21 | Stop reason: HOSPADM

## 2020-10-17 RX ADMIN — METOPROLOL TARTRATE 25 MG: 25 TABLET, FILM COATED ORAL at 08:28

## 2020-10-17 RX ADMIN — FERROUS SULFATE TAB 325 MG (65 MG ELEMENTAL FE) 325 MG: 325 (65 FE) TAB at 08:28

## 2020-10-17 RX ADMIN — FLUTICASONE FUROATE 1 PUFF: 200 POWDER RESPIRATORY (INHALATION) at 08:02

## 2020-10-17 RX ADMIN — APIXABAN 5 MG: 5 TABLET, FILM COATED ORAL at 08:28

## 2020-10-17 RX ADMIN — POLYETHYLENE GLYCOL 3350 17 G: 17 POWDER, FOR SOLUTION ORAL at 08:26

## 2020-10-17 RX ADMIN — Medication 5 MG: at 21:08

## 2020-10-17 RX ADMIN — DOCUSATE SODIUM 50 MG AND SENNOSIDES 8.6 MG 2 TABLET: 8.6; 5 TABLET, FILM COATED ORAL at 21:16

## 2020-10-17 RX ADMIN — CETIRIZINE HYDROCHLORIDE 10 MG: 10 TABLET, FILM COATED ORAL at 08:28

## 2020-10-17 RX ADMIN — PANTOPRAZOLE SODIUM 40 MG: 40 TABLET, DELAYED RELEASE ORAL at 08:29

## 2020-10-17 RX ADMIN — ACETAMINOPHEN 975 MG: 325 TABLET, FILM COATED ORAL at 08:29

## 2020-10-17 RX ADMIN — SIMVASTATIN 10 MG: 10 TABLET, FILM COATED ORAL at 21:08

## 2020-10-17 RX ADMIN — FERROUS SULFATE TAB 325 MG (65 MG ELEMENTAL FE) 325 MG: 325 (65 FE) TAB at 17:14

## 2020-10-17 RX ADMIN — APIXABAN 5 MG: 5 TABLET, FILM COATED ORAL at 21:09

## 2020-10-17 RX ADMIN — Medication 5 MG: at 08:28

## 2020-10-17 RX ADMIN — ACETAMINOPHEN 975 MG: 325 TABLET, FILM COATED ORAL at 16:13

## 2020-10-17 RX ADMIN — DIGOXIN 125 MCG: 125 TABLET ORAL at 08:28

## 2020-10-17 RX ADMIN — ACETAMINOPHEN 975 MG: 325 TABLET, FILM COATED ORAL at 21:08

## 2020-10-17 RX ADMIN — ASPIRIN 81 MG: 81 TABLET, COATED ORAL at 08:28

## 2020-10-17 RX ADMIN — METOPROLOL TARTRATE 25 MG: 25 TABLET, FILM COATED ORAL at 21:08

## 2020-10-17 RX ADMIN — GABAPENTIN 100 MG: 100 CAPSULE ORAL at 08:29

## 2020-10-17 RX ADMIN — GABAPENTIN 200 MG: 100 CAPSULE ORAL at 21:09

## 2020-10-17 RX ADMIN — FUROSEMIDE 40 MG: 40 TABLET ORAL at 08:28

## 2020-10-17 ASSESSMENT — ACTIVITIES OF DAILY LIVING (ADL)
ADLS_ACUITY_SCORE: 21
ADLS_ACUITY_SCORE: 23

## 2020-10-17 NOTE — PROGRESS NOTES
[facility]  Hospitalist Progress Note  Name: Waleska Marie    MRN: [id number]  Physician:  Diego Shell DO, FHM ([facility])    Summary of Stay: Waleska Marie is a 75 year old female with a history of stage III CKD, type 2 diabetes mellitus, atrial fibrillation, severe lymphedema and morbid obesity, COPD, asthma, and mantle cell lymphoma admitted on 10/8/2020 with non-anion gap metabolic acidosis, acute kidney injury, and severe hyperkalemia refractory to medications.  Presented to an outside facility with creatinine of 2.75, potassium 7.9, and bicarb 12 with elevated anion gap.  Most recent baseline renal function shows creatinine of 1.3.  Prior to admission on lisinopril, Lasix, and metformin as well as potassium supplementation 20 mEq twice daily.  Treated with insulin, D50, IV Lasix, normal saline, albuterol nebulizer, and calcium gluconate with no significant improvement in potassium.  Transferred to our facility and nephrology consulted.       Received emergent dialysis on 10/9 with temporary dialysis catheter placed.  Only received 1 dialysis session with subsequent improvement in hyperkalemia and renal function and acidosis.  Also found to have acute on chronic anemia with hemoglobin dropped to 5.8 recovering with 2 unit of packed red blood cells.  Had dialysis catheter removed by interventional radiology on 10/13.  She is clinically improved but given deconditioning needs a TCU/rehab stay.    Assessment & Plan      Acute kidney injury, anion gap metabolic acidosis, and severe hyperkalemia:   Etiology likely related to prior to admission lisinopril and potassium supplements.  Nephrology consulted and renal function recovered and resolved hyperkalemia with emergent dialysis.    Dialysis catheter was removed on 10/13. We continue to hold her prior to admission lisinopril, Lasix, metformin, and potassium supplement.  Potentially resume these meds, except potassium, in the near  future with close lab monitoring   -  Recently resumed half dose lasix.  Renal function stable and taking intake.  I will increase to back to 80 mg but will split the dose daily.     Acute on chronic normocytic anemia:   -Baseline hemoglobin about 8-9 from 2017.  Drop in hemoglobin to 5.8 and recovered with 2 units of packed red blood cells.  Continue empiric PPI but no evidence of active bleeding.  Hemoglobin has been stable in low 7's.    - Gastritis was seen during EUS, in May. Neg H pylori  Last colonoscopy in 2012.  Results not available.  Recommended to have a follow-up with primary care physician regarding this. needs another colonoscopy in the near future.  -Iron profile done during this admission: Ferritin was only 14, with iron saturation index of 7.  Suggestive of iron deficiency anemia in the setting of anemia of chronic disease.  -Per patient, she used to take iron in the past but stopped taking it.      -  Continue iron replacement.  Recheck hgb tomorrow.     Atrial fibrillation, controlled rate  Hypertension:   -Chronically on Eliquis.  We will continue for now with relatively stable hemoglobin.  Continue digoxin, level 1.2 last check.  -Continue oral metoprolol.  -  Hold ACEI, BP controlled without it    Possible urinary tract infection:   Grossly abnormal urinalysis.  Received 6 days of ceftriaxone therapy.  Felt to have been adequately treated.  Prior davis removed earlier in stay.     Obesity and severe lymphedema:   At baseline ambulates with a walker. Lymphedema consult, PT/OT.  -  Lasix as above      Hyperlipidemia: Continue statin    GERD: Continue PPI    Mantle cell lymphoma: Continuing Imbruvica.     Type 2 diabetes mellitus:   -Prior to admission on Lantus 35 units at bedtime with metformin 500 mg twice daily and Actos.  Was initially treated with an insulin drip and IV fluids.    -Pt has not needed any scheduled insulin therapy and received sparse sliding scale insulin.  Likely hold  medications on discharge and follow-up with PCP.    Deconditioning:  -  Therapy here, needs TCU/rehab at discharge.       COVID Status:  COVID-19 PCR Results    COVID-19 PCR Results 5/16/20 10/9/20 10/9/20     0930 0930   COVID-19 Virus PCR to [facility] - Result  Test received-See reflex to IDDL test SARS CoV2 (COVID-19) Virus RT-PCR    COVID-19 Virus PCR to [facility] - Source  Nasopharyngeal    COVID-19 Virus by PCR (External Result) Not Detected     SARS-CoV-2 Virus Specimen Source   Nasopharyngeal   SARS-CoV-2 PCR Result   NEGATIVE      Comments are available for some flowsheets but are not being displayed.         COVID-19 Antibody Results, Testing for Immunity    COVID-19 Antibody Results, Testing for Immunity   No data to display.            Diet: Renal Diet (non-dialysis)  Room Service    DVT Prophylaxis: Apixaban  Kramer Catheter: not present  Code Status: Full Code      Disposition Plan   Awaiting TCU/rehab bed.     Entered: Diego Shell 10/17/2020, 6:23 PM       Interval History   Assumed care, history reviewed.  Ms. Marie feels well.  She thinks she is doing much better than a few days prior.  No new complaints.    -Data reviewed today: I reviewed all new labs and imaging reports over the last 24 hours. I personally reviewed no images or EKG's today.    Physical Exam   Temp: 98  F (36.7  C) Temp src: Oral BP: 127/50 Pulse: 60   Resp: 20 SpO2: 96 % O2 Device: None (Room air)    Vitals:    10/08/20 2300 10/10/20 0530 10/16/20 0632   Weight: 138.2 kg (304 lb 10.8 oz) 140 kg (308 lb 10.3 oz) (!) 154 kg (339 lb 8.1 oz)     Vital Signs with Ranges  Temp:  [97.8  F (36.6  C)-98.2  F (36.8  C)] 98  F (36.7  C)  Pulse:  [60-65] 60  Resp:  [18-20] 20  BP: (124-127)/(50-61) 127/50  SpO2:  [93 %-96 %] 96 %  I/O last 3 completed shifts:  In: 1130 [P.O.:1130]  Out: 1350 [Urine:1350]    GEN:  Alert, oriented x 3, appears comfortable, no overt distress.  HEENT:  Normocephalic/atraumatic, no scleral icterus, no nasal  discharge, mouth moist.  CV:  Regular rate and rhythm, distant.  No rub.  LUNGS:  Clear to auscultation bilaterally without rales/rhonchi/wheezing/retractions.  Symmetric chest rise on inhalation noted.  ABD:  Active bowel sounds, soft, non-tender/non-distended.  No rebound/guarding/rigidity.  EXT:  +2-3 LE edema.  No cyanosis.  No acute joint synovitis noted.  SKIN:  Dry to touch, no exanthems noted in the visualized areas.    Medications     dextrose       sodium chloride 10 mL/hr at 10/10/20 0855       acetaminophen  975 mg Oral TID     apixaban ANTICOAGULANT  5 mg Oral BID     aspirin  81 mg Oral Daily     cetirizine  10 mg Oral Daily     digoxin  125 mcg Oral Daily     ferrous sulfate  325 mg Oral BID w/meals     fluticasone  1 puff Inhalation Daily     furosemide  40 mg Oral Daily     gabapentin  100 mg Oral Daily     gabapentin  200 mg Oral At Bedtime     ibrutinib  560 mg Oral At Bedtime     insulin aspart  1-7 Units Subcutaneous TID AC     insulin aspart  1-5 Units Subcutaneous At Bedtime     metoprolol tartrate  25 mg Oral BID     oxybutynin  5 mg Oral BID     pantoprazole  40 mg Oral QAM AC     polyethylene glycol  17 g Oral Daily     simvastatin  10 mg Oral At Bedtime     sodium chloride (PF)  10 mL Intracatheter Q8H     Data     Recent Labs   Lab 10/15/20  0545 10/14/20  0535 10/13/20  0554 10/12/20  0641   WBC  --  8.2 7.2 6.9   HGB 7.5* 7.3* 7.1* 7.6*   HCT  --  25.6* 25.3* 26.5*   MCV  --  95 95 94   PLT  --  298 307 322     Recent Labs   Lab 10/17/20  0600 10/15/20  0545 10/14/20  0535 10/12/20  0641 10/12/20  0641    137 136   < > 136   POTASSIUM 3.8 4.3 4.3   < > 4.4   CHLORIDE 104 104 105   < > 106   CO2 28 29 27   < > 26   ANIONGAP 5 4 4   < > 4   * 112* 110*   < > 99   BUN 16 19 22   < > 39*   CR 1.04 0.96 1.04   < > 1.31*   GFRESTIMATED 53* 58* 53*   < > 40*   GFRESTBLACK 61 67 61   < > 46*   LEE 7.6* 8.1* 7.9*   < > 8.0*   MAG  --   --   --   --  1.8    < > = values in this  interval not displayed.       No results found for this or any previous visit (from the past 24 hour(s)).

## 2020-10-17 NOTE — PLAN OF CARE
VSS. A/O X4. , 167, insulin provided per MAR. Pt denies CP, SOB. Pain 4/10 in feet and hands, pt provided Tylenol. Pt has edema in hands and legs bilaterally 2+, 3+. Pure wick in place. Wound care completed. Renal diet. Assist of 3+, with lift. Bed programed to turn pt. Heels suspended. Discharge pending TCU placement. Continue to monitor and POC.

## 2020-10-17 NOTE — PROGRESS NOTES
Care Management Follow Up Note    Length of Stay (days) 9    Patient plan of care discussed at Interdisciplinary Rounds: yes  Expected Discharge Date: 10/17/20  Concerns to be Addressed: Securing a TCU bed.         Anticipated Discharge Disposition:  TCU when a bed is secured.    Plan:  Care Management is following to coordinate discharge. TCU referrals have been sent. Pt has been declined as several TCUs due to bed availability. MOHINI called [facility] ([phone]) and m with Admissions Coordinator Kayla Gifford. MOHINI called [facility] and spoke to April, the admissions coordinator is out for the weekend, April will attempt to get an update on the referral status.     NAE Ceballos, LGSW  [facility]    [facility]  [phone]

## 2020-10-17 NOTE — PLAN OF CARE
VSS, afeb., LS are dim, 98% on RA. Good appetite. Pt ate 100% for dinner. Pt was incont of small soft stool this shift, pericare done w/ 3 assist. Pt BG= 202 and 142.  Purewick in place. Plan to discharge when TCU placement found bt SW.

## 2020-10-17 NOTE — PLAN OF CARE
VSS. A&O. RA. mechanical lift, incontinet of bowel and bladder, pericare done w/ 4 assist, purewick in place. . Renal diet. 3 lumen L PICC, SL. BLE wrapped with lymphedema wraps. Wounds: coccyx, R outer knee, R back of thigh, L back of thigh, R inner thigh, L inner thigh, all wounds covered with mepilex. Coccyx is cracked and bleeding. Plan to discharge when TCU placement is found by SW. Continue POC.

## 2020-10-18 ENCOUNTER — APPOINTMENT (OUTPATIENT)
Dept: PHYSICAL THERAPY | Facility: CLINIC | Age: 75
DRG: 682 | End: 2020-10-18
Attending: INTERNAL MEDICINE
Payer: MEDICARE

## 2020-10-18 LAB
ANION GAP SERPL CALCULATED.3IONS-SCNC: 4 MMOL/L (ref 3–14)
BUN SERPL-MCNC: 17 MG/DL (ref 7–30)
CALCIUM SERPL-MCNC: 7.9 MG/DL (ref 8.5–10.1)
CHLORIDE SERPL-SCNC: 103 MMOL/L (ref 94–109)
CO2 SERPL-SCNC: 30 MMOL/L (ref 20–32)
CREAT SERPL-MCNC: 1.06 MG/DL (ref 0.52–1.04)
GFR SERPL CREATININE-BSD FRML MDRD: 52 ML/MIN/{1.73_M2}
GLUCOSE BLDC GLUCOMTR-MCNC: 109 MG/DL (ref 70–99)
GLUCOSE BLDC GLUCOMTR-MCNC: 115 MG/DL (ref 70–99)
GLUCOSE BLDC GLUCOMTR-MCNC: 139 MG/DL (ref 70–99)
GLUCOSE BLDC GLUCOMTR-MCNC: 147 MG/DL (ref 70–99)
GLUCOSE BLDC GLUCOMTR-MCNC: 175 MG/DL (ref 70–99)
GLUCOSE SERPL-MCNC: 111 MG/DL (ref 70–99)
HGB BLD-MCNC: 7.2 G/DL (ref 11.7–15.7)
POTASSIUM SERPL-SCNC: 3.7 MMOL/L (ref 3.4–5.3)
SODIUM SERPL-SCNC: 137 MMOL/L (ref 133–144)

## 2020-10-18 PROCEDURE — 250N000013 HC RX MED GY IP 250 OP 250 PS 637: Performed by: INTERNAL MEDICINE

## 2020-10-18 PROCEDURE — 97110 THERAPEUTIC EXERCISES: CPT | Mod: GP | Performed by: PHYSICAL THERAPIST

## 2020-10-18 PROCEDURE — 80048 BASIC METABOLIC PNL TOTAL CA: CPT | Performed by: INTERNAL MEDICINE

## 2020-10-18 PROCEDURE — 97140 MANUAL THERAPY 1/> REGIONS: CPT | Mod: GP | Performed by: PHYSICAL THERAPIST

## 2020-10-18 PROCEDURE — 94640 AIRWAY INHALATION TREATMENT: CPT

## 2020-10-18 PROCEDURE — 85018 HEMOGLOBIN: CPT | Performed by: INTERNAL MEDICINE

## 2020-10-18 PROCEDURE — 250N000013 HC RX MED GY IP 250 OP 250 PS 637: Performed by: NURSE PRACTITIONER

## 2020-10-18 PROCEDURE — 250N000013 HC RX MED GY IP 250 OP 250 PS 637: Performed by: HOSPITALIST

## 2020-10-18 PROCEDURE — 120N000001 HC R&B MED SURG/OB

## 2020-10-18 PROCEDURE — 999N000157 HC STATISTIC RCP TIME EA 10 MIN

## 2020-10-18 PROCEDURE — 99232 SBSQ HOSP IP/OBS MODERATE 35: CPT | Performed by: INTERNAL MEDICINE

## 2020-10-18 PROCEDURE — 999N001017 HC STATISTIC GLUCOSE BY METER IP

## 2020-10-18 RX ORDER — BISACODYL 10 MG
10 SUPPOSITORY, RECTAL RECTAL DAILY PRN
Status: DISCONTINUED | OUTPATIENT
Start: 2020-10-18 | End: 2020-10-21 | Stop reason: HOSPADM

## 2020-10-18 RX ORDER — POLYETHYLENE GLYCOL 3350 17 G/17G
17 POWDER, FOR SOLUTION ORAL 2 TIMES DAILY
Status: DISCONTINUED | OUTPATIENT
Start: 2020-10-18 | End: 2020-10-21 | Stop reason: HOSPADM

## 2020-10-18 RX ADMIN — GABAPENTIN 200 MG: 100 CAPSULE ORAL at 21:30

## 2020-10-18 RX ADMIN — Medication 5 MG: at 10:10

## 2020-10-18 RX ADMIN — ACETAMINOPHEN 975 MG: 325 TABLET, FILM COATED ORAL at 16:37

## 2020-10-18 RX ADMIN — APIXABAN 5 MG: 5 TABLET, FILM COATED ORAL at 21:31

## 2020-10-18 RX ADMIN — FUROSEMIDE 40 MG: 40 TABLET ORAL at 10:11

## 2020-10-18 RX ADMIN — ACETAMINOPHEN 975 MG: 325 TABLET, FILM COATED ORAL at 10:10

## 2020-10-18 RX ADMIN — GABAPENTIN 100 MG: 100 CAPSULE ORAL at 10:11

## 2020-10-18 RX ADMIN — FERROUS SULFATE TAB 325 MG (65 MG ELEMENTAL FE) 325 MG: 325 (65 FE) TAB at 10:12

## 2020-10-18 RX ADMIN — ASPIRIN 81 MG: 81 TABLET, COATED ORAL at 10:12

## 2020-10-18 RX ADMIN — POLYETHYLENE GLYCOL 3350 17 G: 17 POWDER, FOR SOLUTION ORAL at 21:31

## 2020-10-18 RX ADMIN — METOPROLOL TARTRATE 25 MG: 25 TABLET, FILM COATED ORAL at 21:31

## 2020-10-18 RX ADMIN — ACETAMINOPHEN 975 MG: 325 TABLET, FILM COATED ORAL at 21:31

## 2020-10-18 RX ADMIN — POLYETHYLENE GLYCOL 3350 17 G: 17 POWDER, FOR SOLUTION ORAL at 10:12

## 2020-10-18 RX ADMIN — DIGOXIN 125 MCG: 125 TABLET ORAL at 10:11

## 2020-10-18 RX ADMIN — FLUTICASONE FUROATE 1 PUFF: 200 POWDER RESPIRATORY (INHALATION) at 07:50

## 2020-10-18 RX ADMIN — FERROUS SULFATE TAB 325 MG (65 MG ELEMENTAL FE) 325 MG: 325 (65 FE) TAB at 17:25

## 2020-10-18 RX ADMIN — APIXABAN 5 MG: 5 TABLET, FILM COATED ORAL at 10:11

## 2020-10-18 RX ADMIN — Medication 5 MG: at 21:31

## 2020-10-18 RX ADMIN — SIMVASTATIN 10 MG: 10 TABLET, FILM COATED ORAL at 21:31

## 2020-10-18 RX ADMIN — FUROSEMIDE 40 MG: 40 TABLET ORAL at 16:38

## 2020-10-18 RX ADMIN — PANTOPRAZOLE SODIUM 40 MG: 40 TABLET, DELAYED RELEASE ORAL at 06:30

## 2020-10-18 RX ADMIN — METOPROLOL TARTRATE 25 MG: 25 TABLET, FILM COATED ORAL at 10:11

## 2020-10-18 RX ADMIN — CETIRIZINE HYDROCHLORIDE 10 MG: 10 TABLET, FILM COATED ORAL at 10:11

## 2020-10-18 ASSESSMENT — ACTIVITIES OF DAILY LIVING (ADL)
ADLS_ACUITY_SCORE: 23
ADLS_ACUITY_SCORE: 27
ADLS_ACUITY_SCORE: 23

## 2020-10-18 ASSESSMENT — MIFFLIN-ST. JEOR: SCORE: 2120.25

## 2020-10-18 NOTE — PLAN OF CARE
Pt A&O x4, up with A3 and mechanical lift. VSS, denies pain. Purwick in place. & 147. Triple lumen PICC to L arm. Multiple wounds all over body. Dressings to legs and arms changed, mepiplex on bottom left untouched per POC. Lymph wraps to legs, removed at 1200. PT/OT/WOC/SW following. Currently awaiting placement at TCU. Will continue POC.

## 2020-10-18 NOTE — PLAN OF CARE
VSS, afeb., LS are dim.96% on RA.   Good appetite, ate 100% for dinner. Pt on Banner bed,Pulsate mattress, had Lg BM this shift, perineal  care done .  BG's were 177 & 137. Plan to discontinue to TCU when SW finds available bed.

## 2020-10-18 NOTE — PROGRESS NOTES
[facility]  Hospitalist Progress Note  Name: Waleska Marie    MRN: [id number]  Physician:  Diego Shell DO, FHM (Text Page)    Summary of Stay: Waleska Marie is a 75 year old female with a history of stage III CKD, type 2 diabetes mellitus, atrial fibrillation, severe lymphedema and morbid obesity, COPD, asthma, and mantle cell lymphoma admitted on 10/8/2020 with non-anion gap metabolic acidosis, acute kidney injury, and severe hyperkalemia refractory to medications.  Presented to an outside facility with creatinine of 2.75, potassium 7.9, and bicarb 12 with elevated anion gap.  Most recent baseline renal function shows creatinine of 1.3.  Prior to admission on lisinopril, Lasix, and metformin as well as potassium supplementation 20 mEq twice daily.  Treated with insulin, D50, IV Lasix, normal saline, albuterol nebulizer, and calcium gluconate with no significant improvement in potassium.  Transferred to our facility and nephrology consulted.       Received emergent dialysis on 10/9 with temporary dialysis catheter placed.  Only received 1 dialysis session with subsequent improvement in hyperkalemia and renal function and acidosis.  Also found to have acute on chronic anemia with hemoglobin dropped to 5.8 recovering with 2 unit of packed red blood cells.  Had dialysis catheter removed by interventional radiology on 10/13.  She is clinically improved but given deconditioning needs a TCU/rehab stay.    Assessment & Plan      Acute kidney injury, anion gap metabolic acidosis, and severe hyperkalemia:   Etiology likely related to prior to admission lisinopril and potassium supplements.  Nephrology consulted and renal function recovered and resolved hyperkalemia with emergent dialysis.    Dialysis catheter was removed on 10/13. We continue to hold her prior to admission lisinopril, Lasix, metformin, and potassium supplement.  Potentially resume these meds, except potassium, in the near  future with close lab monitoring   -  Recently resumed half dose lasix.  Renal function stable and taking intake.  I increased it back to 80 mg but split the dose daily.     Acute on chronic normocytic anemia:   -Baseline hemoglobin about 8-9 from 2017.  Drop in hemoglobin to 5.8 and recovered with 2 units of packed red blood cells.  Continue empiric PPI but no evidence of active bleeding.  Hemoglobin has been stable in low 7's.    - Gastritis was seen during EUS, in May. Neg H pylori  Last colonoscopy in 2012.  Results not available.  Recommended to have a follow-up with primary care physician regarding this. needs another colonoscopy in the near future.  -Iron profile done during this admission: Ferritin was only 14, with iron saturation index of 7.  Suggestive of iron deficiency anemia in the setting of anemia of chronic disease.  -Per patient, she used to take iron in the past but stopped taking it.    -  Continue iron replacement.     Constipation:  -  Patient 10/18 complained of more constipation.  Will increase miralax to BID.  Dulcolax CA PRN.  Recent poor mobility and iron may be contributing.       Atrial fibrillation, controlled rate  Hypertension:   -Chronically on Eliquis.  We will continue for now with relatively stable hemoglobin.  Continue digoxin, level 1.2 last check.  -Continue oral metoprolol.  -  Hold ACEI, BP controlled without it    Possible urinary tract infection - completed treatment:   Grossly abnormal urinalysis.  Received 6 days of ceftriaxone therapy.  Felt to have been adequately treated.  Prior davis removed earlier in stay.     Obesity and severe lymphedema:   At baseline ambulates with a walker. Lymphedema consult, PT/OT.  -  Lasix as above      Hyperlipidemia: Continue statin    GERD: Continue PPI    Mantle cell lymphoma: Continuing Imbruvica.     Type 2 diabetes mellitus:   -Prior to admission on Lantus 35 units at bedtime with metformin 500 mg twice daily and Actos.  Was  initially treated with an insulin drip and IV fluids.    -Pt has not needed any scheduled insulin therapy and received sparse sliding scale insulin.  Likely hold medications on discharge and follow-up with PCP.    Deconditioning:  -  Therapy here, needs TCU/rehab at discharge.       COVID Status:  COVID-19 PCR Results    COVID-19 PCR Results 5/16/20 10/9/20 10/9/20     0930 0930   COVID-19 Virus PCR to [facility] - Result  Test received-See reflex to IDDL test SARS CoV2 (COVID-19) Virus RT-PCR    COVID-19 Virus PCR to [facility] - Source  Nasopharyngeal    COVID-19 Virus by PCR (External Result) Not Detected     SARS-CoV-2 Virus Specimen Source   Nasopharyngeal   SARS-CoV-2 PCR Result   NEGATIVE      Comments are available for some flowsheets but are not being displayed.         COVID-19 Antibody Results, Testing for Immunity    COVID-19 Antibody Results, Testing for Immunity   No data to display.            Diet: Room Service  Regular Diet Adult    DVT Prophylaxis: Apixaban  Kramer Catheter: not present  Code Status: Full Code      Disposition Plan   Awaiting TCU/rehab bed, still none available today.     Entered: Diego Shell 10/18/2020, 1:59 PM       Interval History   Ms. Marie feels well.  She feels somewhat constipated with the iron and requested something more than once a day miralax scheduled.  No other new complaints.    -Data reviewed today: I reviewed all new labs and imaging reports over the last 24 hours. I personally reviewed no images or EKG's today.    Physical Exam   Temp: 97.8  F (36.6  C) Temp src: Oral BP: 119/56 Pulse: 72   Resp: 20 SpO2: 96 % O2 Device: None (Room air)    Vitals:    10/10/20 0530 10/16/20 0632 10/18/20 0636   Weight: 140 kg (308 lb 10.3 oz) (!) 154 kg (339 lb 8.1 oz) (!) 154 kg (339 lb 8.1 oz)     Vital Signs with Ranges  Temp:  [97.8  F (36.6  C)-98  F (36.7  C)] 97.8  F (36.6  C)  Pulse:  [50-73] 72  Resp:  [18-20] 20  BP: (119-149)/(50-56) 119/56  SpO2:  [95 %-98 %] 96 %  I/O  last 3 completed shifts:  In: 480 [P.O.:480]  Out: 1700 [Urine:1700]    GEN:  Alert, oriented x 3, appears comfortable, no overt distress.  HEENT:  Normocephalic/atraumatic, no scleral icterus, no nasal discharge, mouth moist.  CV:  Regular rate and rhythm, distant.  No rub.  LUNGS:  Clear to auscultation bilaterally without rales/rhonchi/wheezing/retractions.  Symmetric chest rise on inhalation noted.  ABD:  Active bowel sounds, soft, non-tender/non-distended.  No rebound/guarding/rigidity.  EXT:  +2-3 LE edema.  No cyanosis.  No acute joint synovitis noted.  PICC line site stable.  SKIN:  Dry to touch, no exanthems noted in the visualized areas.    Medications     dextrose       sodium chloride 10 mL/hr at 10/10/20 0855       acetaminophen  975 mg Oral TID     apixaban ANTICOAGULANT  5 mg Oral BID     aspirin  81 mg Oral Daily     cetirizine  10 mg Oral Daily     digoxin  125 mcg Oral Daily     ferrous sulfate  325 mg Oral BID w/meals     fluticasone  1 puff Inhalation Daily     furosemide  40 mg Oral BID     gabapentin  100 mg Oral Daily     gabapentin  200 mg Oral At Bedtime     ibrutinib  560 mg Oral At Bedtime     insulin aspart  1-7 Units Subcutaneous TID AC     insulin aspart  1-5 Units Subcutaneous At Bedtime     metoprolol tartrate  25 mg Oral BID     oxybutynin  5 mg Oral BID     pantoprazole  40 mg Oral QAM AC     polyethylene glycol  17 g Oral Daily     simvastatin  10 mg Oral At Bedtime     sodium chloride (PF)  10 mL Intracatheter Q8H     Data     Recent Labs   Lab 10/18/20  0625 10/15/20  0545 10/14/20  0535 10/13/20  0554 10/12/20  0641   WBC  --   --  8.2 7.2 6.9   HGB 7.2* 7.5* 7.3* 7.1* 7.6*   HCT  --   --  25.6* 25.3* 26.5*   MCV  --   --  95 95 94   PLT  --   --  298 307 322     Recent Labs   Lab 10/18/20  0625 10/17/20  0600 10/15/20  0545 10/12/20  0641 10/12/20  0641    137 137   < > 136   POTASSIUM 3.7 3.8 4.3   < > 4.4   CHLORIDE 103 104 104   < > 106   CO2 30 28 29   < > 26    ANIONGAP 4 5 4   < > 4   * 114* 112*   < > 99   BUN 17 16 19   < > 39*   CR 1.06* 1.04 0.96   < > 1.31*   GFRESTIMATED 52* 53* 58*   < > 40*   GFRESTBLACK 60* 61 67   < > 46*   LEE 7.9* 7.6* 8.1*   < > 8.0*   MAG  --   --   --   --  1.8    < > = values in this interval not displayed.       No results found for this or any previous visit (from the past 24 hour(s)).

## 2020-10-18 NOTE — PLAN OF CARE
A&O. VSS. C/o bilateral knee pain- repositioning provided. . LS diminished. Left PICC in place. Lymph wraps. Purewick. Discharge pending placement.  Harleen Marcus RN on 10/18/2020 at 4:33 AM

## 2020-10-19 ENCOUNTER — APPOINTMENT (OUTPATIENT)
Dept: PHYSICAL THERAPY | Facility: CLINIC | Age: 75
DRG: 682 | End: 2020-10-19
Attending: INTERNAL MEDICINE
Payer: MEDICARE

## 2020-10-19 LAB
GLUCOSE BLDC GLUCOMTR-MCNC: 109 MG/DL (ref 70–99)
GLUCOSE BLDC GLUCOMTR-MCNC: 125 MG/DL (ref 70–99)
GLUCOSE BLDC GLUCOMTR-MCNC: 149 MG/DL (ref 70–99)
GLUCOSE BLDC GLUCOMTR-MCNC: 159 MG/DL (ref 70–99)
GLUCOSE BLDC GLUCOMTR-MCNC: 178 MG/DL (ref 70–99)

## 2020-10-19 PROCEDURE — 250N000013 HC RX MED GY IP 250 OP 250 PS 637: Performed by: INTERNAL MEDICINE

## 2020-10-19 PROCEDURE — 97140 MANUAL THERAPY 1/> REGIONS: CPT | Mod: GP | Performed by: PHYSICAL THERAPIST

## 2020-10-19 PROCEDURE — 999N000040 HC STATISTIC CONSULT NO CHARGE VASC ACCESS

## 2020-10-19 PROCEDURE — 999N001017 HC STATISTIC GLUCOSE BY METER IP

## 2020-10-19 PROCEDURE — 97530 THERAPEUTIC ACTIVITIES: CPT | Mod: GP | Performed by: PHYSICAL THERAPIST

## 2020-10-19 PROCEDURE — 99232 SBSQ HOSP IP/OBS MODERATE 35: CPT | Performed by: INTERNAL MEDICINE

## 2020-10-19 PROCEDURE — 94640 AIRWAY INHALATION TREATMENT: CPT

## 2020-10-19 PROCEDURE — 250N000013 HC RX MED GY IP 250 OP 250 PS 637: Performed by: HOSPITALIST

## 2020-10-19 PROCEDURE — 250N000013 HC RX MED GY IP 250 OP 250 PS 637: Performed by: NURSE PRACTITIONER

## 2020-10-19 PROCEDURE — 999N000157 HC STATISTIC RCP TIME EA 10 MIN

## 2020-10-19 PROCEDURE — 120N000001 HC R&B MED SURG/OB

## 2020-10-19 RX ADMIN — ASPIRIN 81 MG: 81 TABLET, COATED ORAL at 09:31

## 2020-10-19 RX ADMIN — MICONAZOLE NITRATE: 20 POWDER TOPICAL at 09:35

## 2020-10-19 RX ADMIN — FUROSEMIDE 40 MG: 40 TABLET ORAL at 09:31

## 2020-10-19 RX ADMIN — FUROSEMIDE 40 MG: 40 TABLET ORAL at 16:37

## 2020-10-19 RX ADMIN — GABAPENTIN 100 MG: 100 CAPSULE ORAL at 09:31

## 2020-10-19 RX ADMIN — SIMVASTATIN 10 MG: 10 TABLET, FILM COATED ORAL at 21:46

## 2020-10-19 RX ADMIN — Medication 5 MG: at 21:46

## 2020-10-19 RX ADMIN — CETIRIZINE HYDROCHLORIDE 10 MG: 10 TABLET, FILM COATED ORAL at 09:31

## 2020-10-19 RX ADMIN — FERROUS SULFATE TAB 325 MG (65 MG ELEMENTAL FE) 325 MG: 325 (65 FE) TAB at 17:37

## 2020-10-19 RX ADMIN — FERROUS SULFATE TAB 325 MG (65 MG ELEMENTAL FE) 325 MG: 325 (65 FE) TAB at 09:31

## 2020-10-19 RX ADMIN — Medication 5 MG: at 09:31

## 2020-10-19 RX ADMIN — METOPROLOL TARTRATE 25 MG: 25 TABLET, FILM COATED ORAL at 21:45

## 2020-10-19 RX ADMIN — ACETAMINOPHEN 975 MG: 325 TABLET, FILM COATED ORAL at 21:45

## 2020-10-19 RX ADMIN — DIGOXIN 125 MCG: 125 TABLET ORAL at 09:31

## 2020-10-19 RX ADMIN — PANTOPRAZOLE SODIUM 40 MG: 40 TABLET, DELAYED RELEASE ORAL at 06:38

## 2020-10-19 RX ADMIN — FLUTICASONE FUROATE 1 PUFF: 200 POWDER RESPIRATORY (INHALATION) at 08:18

## 2020-10-19 RX ADMIN — ACETAMINOPHEN 975 MG: 325 TABLET, FILM COATED ORAL at 16:36

## 2020-10-19 RX ADMIN — METOPROLOL TARTRATE 25 MG: 25 TABLET, FILM COATED ORAL at 09:31

## 2020-10-19 RX ADMIN — APIXABAN 5 MG: 5 TABLET, FILM COATED ORAL at 21:46

## 2020-10-19 RX ADMIN — APIXABAN 5 MG: 5 TABLET, FILM COATED ORAL at 09:31

## 2020-10-19 RX ADMIN — ACETAMINOPHEN 975 MG: 325 TABLET, FILM COATED ORAL at 09:31

## 2020-10-19 RX ADMIN — GABAPENTIN 200 MG: 100 CAPSULE ORAL at 21:45

## 2020-10-19 ASSESSMENT — ACTIVITIES OF DAILY LIVING (ADL)
ADLS_ACUITY_SCORE: 21
ADLS_ACUITY_SCORE: 25
ADLS_ACUITY_SCORE: 19
ADLS_ACUITY_SCORE: 25
ADLS_ACUITY_SCORE: 27
ADLS_ACUITY_SCORE: 23

## 2020-10-19 NOTE — PROGRESS NOTES
[facility]  Hospitalist Progress Note  Name: Waleska Marie    MRN: [id number]  Physician:  Norberto Vázquez MD      Summary of Stay: Waleska Marie is a 75 year old female with a history of stage III CKD, type 2 diabetes mellitus, atrial fibrillation, severe lymphedema and morbid obesity, COPD, asthma, and mantle cell lymphoma admitted on 10/8/2020 with non-anion gap metabolic acidosis, acute kidney injury, and severe hyperkalemia refractory to medications.  Presented to an outside facility with creatinine of 2.75, potassium 7.9, and bicarb 12 with elevated anion gap.  Most recent baseline renal function shows creatinine of 1.3.  Prior to admission on lisinopril, Lasix, and metformin as well as potassium supplementation 20 mEq twice daily.  Treated with insulin, D50, IV Lasix, normal saline, albuterol nebulizer, and calcium gluconate with no significant improvement in potassium.  Transferred to our facility and nephrology consulted.       Received emergent dialysis on 10/9 with temporary dialysis catheter placed.  Only received 1 dialysis session with subsequent improvement in hyperkalemia and renal function and acidosis.  Also found to have acute on chronic anemia with hemoglobin dropped to 5.8 recovering with 2 unit of packed red blood cells.  Had dialysis catheter removed by interventional radiology on 10/13.  She is clinically improved but given deconditioning needs a TCU/rehab stay.    Assessment & Plan      Acute kidney injury, anion gap metabolic acidosis, and severe hyperkalemia:   Etiology likely related to prior to admission lisinopril and potassium supplements.  Nephrology consulted and renal function recovered and resolved hyperkalemia with emergent dialysis.    Dialysis catheter was removed on 10/13. We continue to hold her prior to admission lisinopril, Lasix, metformin, and potassium supplement.  Potentially resume these meds, except potassium, in the near future  with close lab monitoring   -  Recently resumed half dose lasix.  Renal function stable and taking intake.  increased it back to 80 mg but split the dose daily.     Acute on chronic normocytic anemia:   -Baseline hemoglobin about 8-9 from 2017.  Drop in hemoglobin to 5.8 and recovered with 2 units of packed red blood cells.  Continue empiric PPI but no evidence of active bleeding.  Hemoglobin has been stable in low 7's.    - Gastritis was seen during EUS, in May. Neg H pylori  Last colonoscopy in 2012.  Results not available.  Recommended to have a follow-up with primary care physician regarding this. needs another colonoscopy in the near future.  -Iron profile done during this admission: Ferritin was only 14, with iron saturation index of 7.  Suggestive of iron deficiency anemia in the setting of anemia of chronic disease.  -Per patient, she used to take iron in the past but stopped taking it.    -  Continue iron replacement.     Constipation:  -  Patient 10/18 complained of more constipation.  increased miralax to BID.  Dulcolax NV PRN.  Recent poor mobility and iron may be contributing.       Atrial fibrillation, controlled rate  Hypertension:   -Chronically on Eliquis.  We will continue for now with relatively stable hemoglobin.  Continue digoxin, level 1.2 last check.  -Continue oral metoprolol.  -  Hold ACEI, BP controlled without it    Possible urinary tract infection - completed treatment:   Grossly abnormal urinalysis.  Received 6 days of ceftriaxone therapy.  Felt to have been adequately treated.  Prior davis removed earlier in stay.     Obesity and severe lymphedema:   At baseline ambulates with a walker. Lymphedema consult, PT/OT.  -  Lasix as above      Hyperlipidemia: Continue statin    GERD: Continue PPI    Mantle cell lymphoma: Continuing Imbruvica.     Type 2 diabetes mellitus:   -Prior to admission on Lantus 35 units at bedtime with metformin 500 mg twice daily and Actos.  Was initially treated  with an insulin drip and IV fluids.    -Pt has not needed any scheduled insulin therapy and received sparse sliding scale insulin.  Likely hold medications on discharge and follow-up with PCP.    Deconditioning:  -  Therapy here, needs TCU/rehab at discharge.       COVID Status:  COVID-19 PCR Results    COVID-19 PCR Results 5/16/20 10/9/20 10/9/20     0930 0930   COVID-19 Virus PCR to [facility] - Result  Test received-See reflex to IDDL test SARS CoV2 (COVID-19) Virus RT-PCR    COVID-19 Virus PCR to [facility] - Source  Nasopharyngeal    COVID-19 Virus by PCR (External Result) Not Detected     SARS-CoV-2 Virus Specimen Source   Nasopharyngeal   SARS-CoV-2 PCR Result   NEGATIVE      Comments are available for some flowsheets but are not being displayed.         COVID-19 Antibody Results, Testing for Immunity    COVID-19 Antibody Results, Testing for Immunity   No data to display.            Diet: Room Service  Regular Diet Adult    DVT Prophylaxis: Apixaban  Kramer Catheter: not present  Code Status: Full Code      Disposition Plan   TCU in 1-2 days.  Needs to be down to assist of 2.     Entered: Norberto FULLER Gurpreet 10/19/2020, 2:01 PM       Interval History   I assumed care, history reviewed  She feels reasonably well overall  Discussed with social work, seems to be gradually improving and now on assist of 2.  Planning to remove PICC line.    -Data reviewed today: I reviewed all new labs and imaging reports over the last 24 hours. I personally reviewed no images or EKG's today.    Physical Exam   Temp: 97.5  F (36.4  C) Temp src: Oral BP: 131/55 Pulse: 60   Resp: 18 SpO2: 97 % O2 Device: None (Room air)    Vitals:    10/10/20 0530 10/16/20 0632 10/18/20 0636   Weight: 140 kg (308 lb 10.3 oz) (!) 154 kg (339 lb 8.1 oz) (!) 154 kg (339 lb 8.1 oz)     Vital Signs with Ranges  Temp:  [97.5  F (36.4  C)-98.5  F (36.9  C)] 97.5  F (36.4  C)  Pulse:  [57-89] 60  Resp:  [18-20] 18  BP: (112-131)/(41-55) 131/55  SpO2:  [94  %-97 %] 97 %  I/O last 3 completed shifts:  In: 4801 [P.O.:4801]  Out: 2400 [Urine:2400]    GEN:  Alert, oriented x 3, appears comfortable, no overt distress.  HEENT:  Normocephalic/atraumatic, no scleral icterus, no nasal discharge, mouth moist.  CV:  Regular rate and rhythm, distant.  No rub.  LUNGS:  Clear to auscultation bilaterally without rales/rhonchi/wheezing/retractions.  Symmetric chest rise on inhalation noted.  ABD:  Active bowel sounds, soft, non-tender/non-distended.  No rebound/guarding/rigidity.  EXT: Lymphedema wraps in place.  No cyanosis.  No acute joint synovitis noted.  PICC line site stable.  SKIN:  Dry to touch, no exanthems noted in the visualized areas.    Medications     dextrose       sodium chloride 10 mL/hr at 10/10/20 0855       acetaminophen  975 mg Oral TID     apixaban ANTICOAGULANT  5 mg Oral BID     aspirin  81 mg Oral Daily     cetirizine  10 mg Oral Daily     digoxin  125 mcg Oral Daily     ferrous sulfate  325 mg Oral BID w/meals     fluticasone  1 puff Inhalation Daily     furosemide  40 mg Oral BID     gabapentin  100 mg Oral Daily     gabapentin  200 mg Oral At Bedtime     ibrutinib  560 mg Oral At Bedtime     insulin aspart  1-7 Units Subcutaneous TID AC     insulin aspart  1-5 Units Subcutaneous At Bedtime     metoprolol tartrate  25 mg Oral BID     oxybutynin  5 mg Oral BID     pantoprazole  40 mg Oral QAM AC     polyethylene glycol  17 g Oral BID     simvastatin  10 mg Oral At Bedtime     sodium chloride (PF)  10 mL Intracatheter Q8H     Data     Recent Labs   Lab 10/18/20  0625 10/15/20  0545 10/14/20  0535 10/13/20  0554   WBC  --   --  8.2 7.2   HGB 7.2* 7.5* 7.3* 7.1*   HCT  --   --  25.6* 25.3*   MCV  --   --  95 95   PLT  --   --  298 307     Recent Labs   Lab 10/18/20  0625 10/17/20  0600 10/15/20  0545    137 137   POTASSIUM 3.7 3.8 4.3   CHLORIDE 103 104 104   CO2 30 28 29   ANIONGAP 4 5 4   * 114* 112*   BUN 17 16 19   CR 1.06* 1.04 0.96    GFRESTIMATED 52* 53* 58*   GFRESTBLACK 60* 61 67   LEE 7.9* 7.6* 8.1*       No results found for this or any previous visit (from the past 24 hour(s)).

## 2020-10-19 NOTE — PLAN OF CARE
Pt A&O x4, up with A2 and medical lift. VSS. Denies pain. Generalized edema, lymph wraps to LE's. Dressings changed. PICC removed. PT/OT/WOC following. Miconazole powder to folds.Currently awaiting TCU bed placement. Miconazole powder to folds. Will continue POC.

## 2020-10-19 NOTE — PLAN OF CARE
VSS, afeb., LSaredim, 94% on RA. Pt alert and oriented. Denies pain.Good appetite, ate 100% for dinner. Pt had med soft bm this shift. Good urine output. PICC in place to LUE, flushes well. BG's were 139 & 177. Plan to go to TCU as soon as SW finds available bed.

## 2020-10-19 NOTE — PROGRESS NOTES
NUTRITION ASSESSMENT F/u    CURRENT DIET AND NOURISHMENT ORDER:  Information obtained from patient  Diet: Regular  No food allergies  No changes to intake or diet restrictions prior to admit  Eating well here now that diet liberalized to regular. While on renal diet restrictions, difficult to order in addition to inaccurate trays from kitchen being sent.   Current Intake/Tolerance: Per flow sheet review, 100% intake for majority of documented meals.  Patient is discharge ready once TCU found    LABS/MEDS/PHYSICAL FINDINGS:  Meds reviewed  WOCN 10/16:  coccyx and bilateral ischium Pressure Injury is Stage 3-was a stage 3 last visit as well, did not notice presence of scar tissue during previous visit but this is clearly not new; LE with lymphadema  Labs reviewed  Limited nutrition focused physical exam - about to have dressings changed with RN staff. At least mild muscle depletion consistent with sarcopenic obesity noted in temporal region, hands and upper body clavicle and acromion regions    Malnutrition:  Patient does not meet two of the following criteria necessary for diagnosing malnutrition: significant weight loss, reduced intake, subcutaneous fat loss, muscle loss (yes, consisteimnt with sarcopenic obesity) or fluid retention     INTERVENTION:  Nutrition Diagnosis:  Increased nutrient needs (protein) related to wound healing as evidenced by chronic stage 3 pressure injury on  coccyx and bilateral ischium    Implementation:  Nutrition Education: reviewed protein sources, push at each meal/snack until wound healing  Minerals/vitamins: MVI+M per pressure injury protocol if remains hospitalized   Collaboration and Referral of care: Discussed patient during interdisciplinary care rounds this morning    Follow Up/Monitoring:   Progress towards goals will be monitored and evaluated per protocol and Practice Guidelines        Izzy Sandy, MS, RDN, LD, CNSC  Pager - 3rd floor/ICU: [phone]  Pager - All other  floors: [phone]  Pager - Weekend/holiday: [phone]  Office: [phone]

## 2020-10-19 NOTE — PLAN OF CARE
VSS. Pt endorses R knee pain, declines PRN medication. Oriented. Lung sounds diminished. Bowel sounds active. Adequate UOP. Pt has several open wounds on extremities and coccyx. Groin/abdominal folds moist w/ inter-dry in place. Powder applied. Pt has generalized edema. Lymph wraps in place to be removed today at 1200. Pt continues to await TCU placement.

## 2020-10-20 ENCOUNTER — APPOINTMENT (OUTPATIENT)
Dept: OCCUPATIONAL THERAPY | Facility: CLINIC | Age: 75
DRG: 682 | End: 2020-10-20
Attending: INTERNAL MEDICINE
Payer: MEDICARE

## 2020-10-20 ENCOUNTER — APPOINTMENT (OUTPATIENT)
Dept: PHYSICAL THERAPY | Facility: CLINIC | Age: 75
DRG: 682 | End: 2020-10-20
Attending: INTERNAL MEDICINE
Payer: MEDICARE

## 2020-10-20 LAB
CREAT SERPL-MCNC: 0.97 MG/DL (ref 0.52–1.04)
GFR SERPL CREATININE-BSD FRML MDRD: 57 ML/MIN/{1.73_M2}
GLUCOSE BLDC GLUCOMTR-MCNC: 113 MG/DL (ref 70–99)
GLUCOSE BLDC GLUCOMTR-MCNC: 126 MG/DL (ref 70–99)
GLUCOSE BLDC GLUCOMTR-MCNC: 145 MG/DL (ref 70–99)
GLUCOSE BLDC GLUCOMTR-MCNC: 151 MG/DL (ref 70–99)
GLUCOSE BLDC GLUCOMTR-MCNC: 181 MG/DL (ref 70–99)

## 2020-10-20 PROCEDURE — 250N000013 HC RX MED GY IP 250 OP 250 PS 637: Performed by: INTERNAL MEDICINE

## 2020-10-20 PROCEDURE — 99207 PR NO CHARGE LOS: CPT | Performed by: NURSE PRACTITIONER

## 2020-10-20 PROCEDURE — 82565 ASSAY OF CREATININE: CPT | Performed by: INTERNAL MEDICINE

## 2020-10-20 PROCEDURE — 999N001017 HC STATISTIC GLUCOSE BY METER IP

## 2020-10-20 PROCEDURE — 94640 AIRWAY INHALATION TREATMENT: CPT

## 2020-10-20 PROCEDURE — 120N000001 HC R&B MED SURG/OB

## 2020-10-20 PROCEDURE — 97530 THERAPEUTIC ACTIVITIES: CPT | Mod: GP

## 2020-10-20 PROCEDURE — 999N000105 HC STATISTIC NO DOCUMENTATION TO SUPPORT CHARGE

## 2020-10-20 PROCEDURE — 36415 COLL VENOUS BLD VENIPUNCTURE: CPT | Performed by: INTERNAL MEDICINE

## 2020-10-20 PROCEDURE — 999N000157 HC STATISTIC RCP TIME EA 10 MIN

## 2020-10-20 PROCEDURE — 250N000013 HC RX MED GY IP 250 OP 250 PS 637: Performed by: HOSPITALIST

## 2020-10-20 PROCEDURE — 97110 THERAPEUTIC EXERCISES: CPT | Mod: GO | Performed by: REHABILITATION PRACTITIONER

## 2020-10-20 PROCEDURE — 99232 SBSQ HOSP IP/OBS MODERATE 35: CPT | Performed by: INTERNAL MEDICINE

## 2020-10-20 PROCEDURE — 250N000013 HC RX MED GY IP 250 OP 250 PS 637: Performed by: NURSE PRACTITIONER

## 2020-10-20 RX ORDER — PANTOPRAZOLE SODIUM 40 MG/1
40 TABLET, DELAYED RELEASE ORAL
DISCHARGE
Start: 2020-10-20 | End: 2020-10-27

## 2020-10-20 RX ORDER — FERROUS SULFATE 325(65) MG
325 TABLET ORAL 2 TIMES DAILY WITH MEALS
DISCHARGE
Start: 2020-10-20 | End: 2020-10-22 | Stop reason: ALTCHOICE

## 2020-10-20 RX ORDER — IPRATROPIUM BROMIDE AND ALBUTEROL SULFATE 2.5; .5 MG/3ML; MG/3ML
3 SOLUTION RESPIRATORY (INHALATION) EVERY 4 HOURS PRN
DISCHARGE
Start: 2020-10-20 | End: 2020-12-08

## 2020-10-20 RX ORDER — FUROSEMIDE 40 MG
40 TABLET ORAL 2 TIMES DAILY
DISCHARGE
Start: 2020-10-20

## 2020-10-20 RX ORDER — POLYETHYLENE GLYCOL 3350 17 G/17G
17 POWDER, FOR SOLUTION ORAL 2 TIMES DAILY PRN
DISCHARGE
Start: 2020-10-20

## 2020-10-20 RX ORDER — AMOXICILLIN 250 MG
1 CAPSULE ORAL 2 TIMES DAILY PRN
DISCHARGE
Start: 2020-10-20

## 2020-10-20 RX ORDER — INSULIN ASPART 100 [IU]/ML
INJECTION, SOLUTION INTRAVENOUS; SUBCUTANEOUS
Qty: 15 ML | Refills: 0 | DISCHARGE
Start: 2020-10-20 | End: 2020-10-23

## 2020-10-20 RX ADMIN — METOPROLOL TARTRATE 25 MG: 25 TABLET, FILM COATED ORAL at 21:28

## 2020-10-20 RX ADMIN — PANTOPRAZOLE SODIUM 40 MG: 40 TABLET, DELAYED RELEASE ORAL at 06:21

## 2020-10-20 RX ADMIN — ACETAMINOPHEN 975 MG: 325 TABLET, FILM COATED ORAL at 16:28

## 2020-10-20 RX ADMIN — GABAPENTIN 100 MG: 100 CAPSULE ORAL at 09:32

## 2020-10-20 RX ADMIN — APIXABAN 5 MG: 5 TABLET, FILM COATED ORAL at 21:28

## 2020-10-20 RX ADMIN — FUROSEMIDE 40 MG: 40 TABLET ORAL at 08:19

## 2020-10-20 RX ADMIN — SIMVASTATIN 10 MG: 10 TABLET, FILM COATED ORAL at 21:28

## 2020-10-20 RX ADMIN — FUROSEMIDE 40 MG: 40 TABLET ORAL at 16:29

## 2020-10-20 RX ADMIN — FLUTICASONE FUROATE 1 PUFF: 200 POWDER RESPIRATORY (INHALATION) at 08:08

## 2020-10-20 RX ADMIN — METOPROLOL TARTRATE 25 MG: 25 TABLET, FILM COATED ORAL at 09:33

## 2020-10-20 RX ADMIN — Medication 5 MG: at 09:33

## 2020-10-20 RX ADMIN — ASPIRIN 81 MG: 81 TABLET, COATED ORAL at 09:32

## 2020-10-20 RX ADMIN — APIXABAN 5 MG: 5 TABLET, FILM COATED ORAL at 09:33

## 2020-10-20 RX ADMIN — MICONAZOLE NITRATE: 20 POWDER TOPICAL at 11:19

## 2020-10-20 RX ADMIN — Medication 5 MG: at 21:28

## 2020-10-20 RX ADMIN — GABAPENTIN 200 MG: 100 CAPSULE ORAL at 21:28

## 2020-10-20 RX ADMIN — ACETAMINOPHEN 975 MG: 325 TABLET, FILM COATED ORAL at 21:27

## 2020-10-20 RX ADMIN — DIGOXIN 125 MCG: 125 TABLET ORAL at 09:31

## 2020-10-20 RX ADMIN — FERROUS SULFATE TAB 325 MG (65 MG ELEMENTAL FE) 325 MG: 325 (65 FE) TAB at 09:31

## 2020-10-20 RX ADMIN — FERROUS SULFATE TAB 325 MG (65 MG ELEMENTAL FE) 325 MG: 325 (65 FE) TAB at 17:51

## 2020-10-20 RX ADMIN — CETIRIZINE HYDROCHLORIDE 10 MG: 10 TABLET, FILM COATED ORAL at 09:32

## 2020-10-20 RX ADMIN — ACETAMINOPHEN 975 MG: 325 TABLET, FILM COATED ORAL at 09:34

## 2020-10-20 ASSESSMENT — ACTIVITIES OF DAILY LIVING (ADL)
ADLS_ACUITY_SCORE: 27
ADLS_ACUITY_SCORE: 27
ADLS_ACUITY_SCORE: 19
ADLS_ACUITY_SCORE: 23

## 2020-10-20 NOTE — PLAN OF CARE
Vitals: VSS. Afebrile. Denies pain.   Labs:  and 113.   Neuro: A&O x4. Calm, cooperative.   Respiratory: Lung sounds diminished. No cough. Reports SOB at times.   Cardiac: Apical pulse irregular. Denies chest pain. Edema to BUE and BLE.   GI/: Inc of urine. Purewick in place.   Skin: Multiple skin issues. Wound care completed per orders.   LDAs: No IV access.   Diet: Regular  Activity: A2 lift  Teaching: POC reviewed with pt. Questions asked and answered.   Plan: PT will discharge to TCU once bed is available. Continue with POC.

## 2020-10-20 NOTE — PROGRESS NOTES
[facility]    Palliative Care Chart Check, Chart Review or Consultation without direct patient contact    This patient's H+P, most recent hospitalist note, most recent consultant notes, medication profile and labs from the past 24 hours have been reviewed.    Recommendation: patient denies pain per nursing notes. No change in condition, goals of care are current and accurate. No changes.  It has been determined that no change is necessary to the current plan of care at this time.   The chart will be reviewed regularly and the patient will be seen if necessary.   If you would like the patient to be seen, please contact the service at [phone] and ask to have the patient seen.  Time Spent 5 minutes, none in direct contact with patient or family.    Thank you!    Corina Bahena   Pain Management and Palliative Care  [facility]  Pgr: [phone]

## 2020-10-20 NOTE — PROGRESS NOTES
[facility]  Hospitalist Progress Note  Name: Waleska Marie    MRN: [id number]  Physician:  Norberto Vázquez MD      Summary of Stay: Waleska Marie is a 75 year old female with a history of stage III CKD, type 2 diabetes mellitus, atrial fibrillation, severe lymphedema and morbid obesity, COPD, asthma, and mantle cell lymphoma admitted on 10/8/2020 with non-anion gap metabolic acidosis, acute kidney injury, and severe hyperkalemia refractory to medications.  Presented to an outside facility with creatinine of 2.75, potassium 7.9, and bicarb 12 with elevated anion gap.  Most recent baseline renal function shows creatinine of 1.3.  Prior to admission on lisinopril, Lasix, and metformin as well as potassium supplementation 20 mEq twice daily.  Treated with insulin, D50, IV Lasix, normal saline, albuterol nebulizer, and calcium gluconate with no significant improvement in potassium.  Transferred to our facility and nephrology consulted.       Received emergent dialysis on 10/9 with temporary dialysis catheter placed.  Only received 1 dialysis session with subsequent improvement in hyperkalemia and renal function and acidosis.  Also found to have acute on chronic anemia with hemoglobin dropped to 5.8 recovering with 2 unit of packed red blood cells.  Had dialysis catheter removed by interventional radiology on 10/13.  She is clinically improved but given deconditioning needs a TCU/rehab stay.    Assessment & Plan      Acute kidney injury, anion gap metabolic acidosis, and severe hyperkalemia:   Etiology likely related to prior to admission lisinopril and potassium supplements.  Nephrology consulted and renal function recovered and resolved hyperkalemia with emergent dialysis.    Dialysis catheter was removed on 10/13. We continue to hold her prior to admission lisinopril, Lasix, metformin, and potassium supplement.  Potentially resume these meds, except potassium, in the near future  with close lab monitoring   -  Recently resumed half dose lasix.  Renal function stable and taking intake.  increased it back to 80 mg but split the dose to twice daily.     Acute on chronic normocytic anemia:   -Baseline hemoglobin about 8-9 from 2017.  Drop in hemoglobin to 5.8 and recovered with 2 units of packed red blood cells.  Continue empiric PPI but no evidence of active bleeding.  Hemoglobin has been stable in low 7's.    - Gastritis was seen during EUS, in May. Neg H pylori  Last colonoscopy in 2012.  Results not available.  Recommended to have a follow-up with primary care physician regarding this. needs another colonoscopy in the near future.  -Iron profile done during this admission: Ferritin was only 14, with iron saturation index of 7.  Suggestive of iron deficiency anemia in the setting of anemia of chronic disease.  -Per patient, she used to take iron in the past but stopped taking it.    -  Continue iron replacement.     Constipation:  -  Patient 10/18 complained of more constipation.  increased miralax to BID.  Dulcolax MD PRN.  Recent poor mobility and iron may be contributing.       Atrial fibrillation, controlled rate  Hypertension:   -Chronically on Eliquis.  We will continue for now with relatively stable hemoglobin.  Continue digoxin, level 1.2 last check.  -Continue oral metoprolol.  -  Hold ACEI, BP controlled without it    Possible urinary tract infection - completed treatment:   Grossly abnormal urinalysis.  Received 6 days of ceftriaxone therapy.  Felt to have been adequately treated.  Prior davis removed earlier in stay.     Obesity and severe lymphedema:   At baseline ambulates with a walker. Lymphedema consult, PT/OT.  -  Lasix as above      Hyperlipidemia: Continue statin    GERD: Continue PPI    Mantle cell lymphoma: Continuing Imbruvica.     Type 2 diabetes mellitus:   -Prior to admission on Lantus 35 units at bedtime with metformin 500 mg twice daily and Actos.  Was initially  treated with an insulin drip and IV fluids.    -Pt has not needed any scheduled insulin therapy and received sparse sliding scale insulin.  Likely hold medications on discharge and follow-up with PCP.    Deconditioning:  -  Therapy here, needs TCU/rehab at discharge.       COVID Status:  COVID-19 PCR Results    COVID-19 PCR Results 5/16/20 10/9/20 10/9/20     0930 0930   COVID-19 Virus PCR to [facility] - Result  Test received-See reflex to IDDL test SARS CoV2 (COVID-19) Virus RT-PCR    COVID-19 Virus PCR to [facility] - Source  Nasopharyngeal    COVID-19 Virus by PCR (External Result) Not Detected     SARS-CoV-2 Virus Specimen Source   Nasopharyngeal   SARS-CoV-2 PCR Result   NEGATIVE      Comments are available for some flowsheets but are not being displayed.         COVID-19 Antibody Results, Testing for Immunity    COVID-19 Antibody Results, Testing for Immunity   No data to display.            Diet: Room Service  Regular Diet Adult  Advance Diet as Tolerated    DVT Prophylaxis: Apixaban  Kramer Catheter: not present  Code Status: Full Code      Disposition Plan   TCU available 10/21.     Entered: Norberto Vázquez 10/20/2020, 11:44 AM       Interval History   Discussed w/ SW, have a TCU for tomorrow.  She feels reasonably well overall  Remains on room air.    -Data reviewed today: I reviewed all new labs and imaging reports over the last 24 hours. I personally reviewed no images or EKG's today.    Physical Exam   Temp: 98.1  F (36.7  C) Temp src: Axillary BP: 129/56 Pulse: 68   Resp: 18 SpO2: 95 % O2 Device: None (Room air)    Vitals:    10/10/20 0530 10/16/20 0632 10/18/20 0636   Weight: 140 kg (308 lb 10.3 oz) (!) 154 kg (339 lb 8.1 oz) (!) 154 kg (339 lb 8.1 oz)     Vital Signs with Ranges  Temp:  [97.6  F (36.4  C)-98.1  F (36.7  C)] 98.1  F (36.7  C)  Pulse:  [52-69] 68  Resp:  [16-18] 18  BP: (103-139)/(42-57) 129/56  SpO2:  [93 %-97 %] 95 %  I/O last 3 completed shifts:  In: 480 [P.O.:480]  Out: 4927  [Urine:1725]    GEN:  Alert, oriented x 3, appears comfortable, no overt distress.  HEENT:  Normocephalic/atraumatic, no scleral icterus, no nasal discharge, mouth moist.  CV:  Regular rate and rhythm, distant.  No rub.  LUNGS:  Clear to auscultation bilaterally without rales/rhonchi/wheezing/retractions.  Symmetric chest rise on inhalation noted.  ABD:  Active bowel sounds, soft, non-tender/non-distended.  No rebound/guarding/rigidity.  EXT: Lymphedema wraps in place.  No cyanosis.  No acute joint synovitis noted.    SKIN:  Dry to touch, no exanthems noted in the visualized areas.    Medications     dextrose       sodium chloride 10 mL/hr at 10/10/20 0855       acetaminophen  975 mg Oral TID     apixaban ANTICOAGULANT  5 mg Oral BID     aspirin  81 mg Oral Daily     cetirizine  10 mg Oral Daily     digoxin  125 mcg Oral Daily     ferrous sulfate  325 mg Oral BID w/meals     fluticasone  1 puff Inhalation Daily     furosemide  40 mg Oral BID     gabapentin  100 mg Oral Daily     gabapentin  200 mg Oral At Bedtime     ibrutinib  560 mg Oral At Bedtime     insulin aspart  1-7 Units Subcutaneous TID AC     insulin aspart  1-5 Units Subcutaneous At Bedtime     metoprolol tartrate  25 mg Oral BID     oxybutynin  5 mg Oral BID     pantoprazole  40 mg Oral QAM AC     polyethylene glycol  17 g Oral BID     simvastatin  10 mg Oral At Bedtime     sodium chloride (PF)  10 mL Intracatheter Q8H     Data     Recent Labs   Lab 10/18/20  0625 10/15/20  0545 10/14/20  0535   WBC  --   --  8.2   HGB 7.2* 7.5* 7.3*   HCT  --   --  25.6*   MCV  --   --  95   PLT  --   --  298     Recent Labs   Lab 10/20/20  0723 10/18/20  0625 10/17/20  0600 10/15/20  0545   NA  --  137 137 137   POTASSIUM  --  3.7 3.8 4.3   CHLORIDE  --  103 104 104   CO2  --  30 28 29   ANIONGAP  --  4 5 4   GLC  --  111* 114* 112*   BUN  --  17 16 19   CR 0.97 1.06* 1.04 0.96   GFRESTIMATED 57* 52* 53* 58*   GFRESTBLACK 67 60* 61 67   LEE  --  7.9* 7.6* 8.1*        No results found for this or any previous visit (from the past 24 hour(s)).

## 2020-10-20 NOTE — PLAN OF CARE
VSS. Some bradycardia overnight w/ HR in 50s. HR irregular dt afib. Denies pain overnight. Oriented. Lung sounds diminished. Bowel sounds active. Pt has several open wounds on extremities and coccyx. Dressings in place. Groin/abdominal folds moist w/ patchy redness. L groin fold and L breast have open areas present w/ red rash - applied Desenex and dry cloth to area. Pt has generalized edema w/ lymph wraps in place. Purewick in place with adequate UOP. Pt continues to await TCU placement.

## 2020-10-20 NOTE — PROGRESS NOTES
Care Management Follow Up Note    Length of Stay (days) 12    Patient plan of care discussed at Interdisciplinary Rounds: yes  Expected Discharge Date: 10/21/20  Concerns to be Addressed:         Anticipated Discharge Disposition:    Anticipated Discharge Services:    Anticipated Discharge DME:      Plan:  Sw received a call from [facility] stating that they are able to accept the pt tomorrow in TCU.  They will order a geriatric bed for the pt.  Bhavya spoke with the pt who is still hopeful that she can get a TCU bed at [facility] or [facility] for tomorrow.      Bhavya called and left a vm with Kayla Gifford at [facility] to identify if they will have a bed available for the pt tomorrow P: [phone].  Bhavya called and left a vm with Gia Hinkle at [facility] to identify if they will have a bed available for the pt tomorrow P: [phone].    Sw updated the pt.    Sw will continue with discharge planning and will be available as needed until discharge.    NAE Perez, [facility]  Inpatient Care Coordination  [facility]  [phone]

## 2020-10-20 NOTE — PLAN OF CARE
VSS, afeb., LS are dim. 97% on RA. Pt alert, oriented and pleasant. Good appetite, ate 100% for dinner. Pt was up in chair for 1 hour via lift, mayo well. Pt denies pain, dressings to wounds are cdi. BG was 159. Plan to discharge to TCU tomorrow if bed available. POC reviewed with pt and pt daughter.

## 2020-10-21 VITALS
SYSTOLIC BLOOD PRESSURE: 124 MMHG | HEART RATE: 64 BPM | TEMPERATURE: 97.8 F | BODY MASS INDEX: 41.95 KG/M2 | WEIGHT: 293 LBS | OXYGEN SATURATION: 95 % | RESPIRATION RATE: 20 BRPM | DIASTOLIC BLOOD PRESSURE: 48 MMHG | HEIGHT: 70 IN

## 2020-10-21 LAB
GLUCOSE BLDC GLUCOMTR-MCNC: 104 MG/DL (ref 70–99)
GLUCOSE BLDC GLUCOMTR-MCNC: 128 MG/DL (ref 70–99)
GLUCOSE BLDC GLUCOMTR-MCNC: 137 MG/DL (ref 70–99)
POTASSIUM SERPL-SCNC: 3.5 MMOL/L (ref 3.4–5.3)

## 2020-10-21 PROCEDURE — 250N000013 HC RX MED GY IP 250 OP 250 PS 637: Performed by: INTERNAL MEDICINE

## 2020-10-21 PROCEDURE — 36415 COLL VENOUS BLD VENIPUNCTURE: CPT | Performed by: INTERNAL MEDICINE

## 2020-10-21 PROCEDURE — 99239 HOSP IP/OBS DSCHRG MGMT >30: CPT | Performed by: INTERNAL MEDICINE

## 2020-10-21 PROCEDURE — 999N001017 HC STATISTIC GLUCOSE BY METER IP

## 2020-10-21 PROCEDURE — 250N000013 HC RX MED GY IP 250 OP 250 PS 637: Performed by: HOSPITALIST

## 2020-10-21 PROCEDURE — 250N000013 HC RX MED GY IP 250 OP 250 PS 637: Performed by: NURSE PRACTITIONER

## 2020-10-21 PROCEDURE — 999N000157 HC STATISTIC RCP TIME EA 10 MIN

## 2020-10-21 PROCEDURE — 84132 ASSAY OF SERUM POTASSIUM: CPT | Performed by: INTERNAL MEDICINE

## 2020-10-21 PROCEDURE — 94640 AIRWAY INHALATION TREATMENT: CPT

## 2020-10-21 RX ADMIN — ASPIRIN 81 MG: 81 TABLET, COATED ORAL at 09:02

## 2020-10-21 RX ADMIN — FUROSEMIDE 40 MG: 40 TABLET ORAL at 09:02

## 2020-10-21 RX ADMIN — APIXABAN 5 MG: 5 TABLET, FILM COATED ORAL at 09:03

## 2020-10-21 RX ADMIN — DIGOXIN 125 MCG: 125 TABLET ORAL at 09:03

## 2020-10-21 RX ADMIN — FLUTICASONE FUROATE 1 PUFF: 200 POWDER RESPIRATORY (INHALATION) at 08:09

## 2020-10-21 RX ADMIN — CETIRIZINE HYDROCHLORIDE 10 MG: 10 TABLET, FILM COATED ORAL at 09:03

## 2020-10-21 RX ADMIN — FERROUS SULFATE TAB 325 MG (65 MG ELEMENTAL FE) 325 MG: 325 (65 FE) TAB at 09:03

## 2020-10-21 RX ADMIN — ACETAMINOPHEN 975 MG: 325 TABLET, FILM COATED ORAL at 09:02

## 2020-10-21 RX ADMIN — ACETAMINOPHEN 975 MG: 325 TABLET, FILM COATED ORAL at 15:45

## 2020-10-21 RX ADMIN — METOPROLOL TARTRATE 25 MG: 25 TABLET, FILM COATED ORAL at 09:03

## 2020-10-21 RX ADMIN — PANTOPRAZOLE SODIUM 40 MG: 40 TABLET, DELAYED RELEASE ORAL at 06:59

## 2020-10-21 RX ADMIN — GABAPENTIN 100 MG: 100 CAPSULE ORAL at 09:02

## 2020-10-21 RX ADMIN — FUROSEMIDE 40 MG: 40 TABLET ORAL at 15:45

## 2020-10-21 RX ADMIN — Medication 5 MG: at 09:02

## 2020-10-21 ASSESSMENT — MIFFLIN-ST. JEOR: SCORE: 2110.25

## 2020-10-21 ASSESSMENT — ACTIVITIES OF DAILY LIVING (ADL)
ADLS_ACUITY_SCORE: 23

## 2020-10-21 NOTE — PROGRESS NOTES
Care Management Discharge Note    Discharge Planning:  Expected Discharge Date: 10/21/20     Concerns to be Addressed:  All concerns addressed.    Anticipated Discharge Disposition:  [facility] TCU  Anticipated Discharge Services:  Therapies  Anticipated Discharge DME:  Unknown    Patient/family educated on Medicare website which has current facility and service quality ratings:  Yes  Referrals Placed by CM/SW:  TCU  Education Provided on the Discharge Plan:  Yes  Patient/Family in Agreement with the Plan:  Yes  PAS Number: [id number]  Disposition Comments:  The pt will discharge to [facility] TCU today at 1530.      Selected Continued Care - Admitted Since 10/8/2020    No services have been selected for the patient.         Additional Information:  Donta spoke with [facility] who said that they are able to accept the pt at 1300 or after.  She said that they will need to get a bariatric bed into the room for the pt.  She told sw that the pt's medication, Imbruvica, is almost $14,000 and the TCU's pharmacy is unable to get this medication.  The pt will need to supply this herself.  A copayment was discussed with the pt of $179/day if she is there over 20 days and her secondary insurance is not confirmed.    Dotna met with the pt to update her on the medication.  The pt said that she was aware of it.  She said that she would have preferred a facility closer to home, but is agreeable to [facility].  She plans to have her family bring her clothes and drop them off at [facility].  The pt requested a lift chair at the facility and donta told her that they may not have list chairs, but that they have already talked with the facility about it.  Donta discussed transportation options with the pt.  Donta told her that based on her weight, she will likely need stretcher transport and she agreed.  Donta reviewed the out of pocket cost for [facility] stretcher transport, $1042.75 for base rate and $25 per mile to the destination. Pt/family expressed understanding and are  "agreeable to this.      Patient requires stretcher transportation due to her weighing 337 pounds and being 5'10\" tall.      Stretcher transportation has been arranged for 10/21 at 1530.  Patient and bedside nurse notified of transportation time.    Ambulance PCS form completed and given to the [facility] to give to the transportation team.    Sw updated [facility] on the discharge time and orders were sent.    No additional sw concerns or needs at this time.  Sw will continue to be available as needed until discharge.        10/21/20 1222   Final Resources   Resources List Skilled Nursing Facility   Skilled Nursing Facility [facility] [phone], Fax: [phone]   PAS Number [id number]       NAE Perez, [facility]  Inpatient Care Coordination  [facility]  [phone]  "

## 2020-10-21 NOTE — PLAN OF CARE
Occupational Therapy Discharge Summary    Reason for therapy discharge:    Discharged to transitional care facility.    Progress towards therapy goal(s). See goals on Care Plan in [facility] electronic health record for goal details.  Goals partially met.  Barriers to achieving goals:   limited tolerance for therapy and discharge from facility.    Therapy recommendation(s):    Continued therapy is recommended.  Rationale/Recommendations:  Ongoing skilled OT at TCU to improve safety and ind in ADLs.

## 2020-10-21 NOTE — PLAN OF CARE
VSS. Pt denies CP, SOB. A/OX4. , 151. Pt wounds covered by lymphedema wraps. Incontinence care provided, new purewick placed. Pt has pain under left breast, tylenol given, skin care provided under folds. Pt edema in extremities 2+. Waiting for TCU placement, hopeful discharge tomorrow. Regular diet. Assist with lift. Continue to monitor and POC.

## 2020-10-21 NOTE — PROGRESS NOTES
Your information has been submitted on October 21st, 2020 at 11:28:29 AM CDT. The confirmation number is [id number]

## 2020-10-21 NOTE — DISCHARGE SUMMARY
[facility]  Discharge Summary  Name: Waleska Marei    MRN: [id number]  YOB: 1945    Age: 75 year old  Date of Discharge:  10/21/2020  Date of Admission: 10/8/2020  Primary Care Provider: Nahun Espinoza  Discharge Physician:  Alex Vázquez MD  Discharging Service:  Hospitalist      Hospital Course/Discharge Diagnoses:  Waleska Marie is a 75 year old female with a history of stage III CKD, type 2 diabetes mellitus, atrial fibrillation, severe lymphedema and morbid obesity, COPD, asthma, and mantle cell lymphoma admitted on 10/8/2020 with non-anion gap metabolic acidosis, acute kidney injury, and severe hyperkalemia refractory to medications.  Presented to an outside facility with creatinine of 2.75, potassium 7.9, and bicarb 12 with elevated anion gap.  Most recent baseline renal function shows creatinine of 1.3.  Prior to admission on lisinopril, Lasix, and metformin as well as potassium supplementation 20 mEq twice daily.  Treated with insulin, D50, IV Lasix, normal saline, albuterol nebulizer, and calcium gluconate with no significant improvement in potassium.  Transferred to our facility and nephrology consulted.       Received emergent dialysis on 10/9 with temporary dialysis catheter placed.  Only received 1 dialysis session with subsequent improvement in hyperkalemia and renal function and acidosis.  Also found to have acute on chronic anemia with hemoglobin dropped to 5.8 recovering with 2 unit of packed red blood cells.  Had dialysis catheter removed by interventional radiology on 10/13.  She is clinically improved but given deconditioning needs a TCU/rehab stay.       Assessment & Plan         Acute kidney injury, anion gap metabolic acidosis, and severe hyperkalemia:   Etiology likely related to prior to admission lisinopril and potassium supplements.  Nephrology consulted and renal function recovered and resolved hyperkalemia with emergent dialysis.    Dialysis  catheter was removed on 10/13.   --We continued to hold her prior to admission lisinopril, metformin, and potassium supplement.  --  Recently resumed half dose lasix.  Renal function stable and taking intake.  increased it back to 80 mg but split the dose to twice daily.  --rechecking K prior to discharge to determine if we need to restart potassium supplement  --should recheck bmp in 2-3 days.     Acute on chronic normocytic anemia:   -Baseline hemoglobin about 8-9 from 2017.  Drop in hemoglobin to 5.8 and recovered with 2 units of packed red blood cells.  Continue empiric PPI but no evidence of active bleeding.  Hemoglobin has been stable in low 7's.    - Gastritis was seen during EUS, in May. Neg H pylori  Last colonoscopy in 2012.  Results not available.  Recommended to have a follow-up with primary care physician regarding this. needs another colonoscopy in the near future.  -Iron profile done during this admission: Ferritin was only 14, with iron saturation index of 7.  Suggestive of iron deficiency anemia in the setting of anemia of chronic disease.  -Per patient, she used to take iron in the past but stopped taking it.    -started/Continue iron replacement.      Constipation:  -  Patient 10/18 complained of more constipation.  increased miralax to BID.  Dulcolax IN PRN.  Recent poor mobility and iron may be contributing.       Atrial fibrillation, controlled rate  Hypertension:   -Chronically on Eliquis.  We will continue for now with relatively stable hemoglobin.  Continue digoxin, level 1.2 last check.  -Continue oral metoprolol.  - Hold ACEI, BP controlled without it     Possible urinary tract infection - completed treatment:   Grossly abnormal urinalysis.  Received 6 days of ceftriaxone therapy.  Felt to have been adequately treated.  Prior davis removed earlier in stay.     Obesity and severe lymphedema:   At baseline ambulates with a walker. Lymphedema consult, PT/OT.  -  Lasix as  above      Hyperlipidemia: Continue statin     GERD: Continue PPI     Mantle cell lymphoma: Continuing Imbruvica.     Type 2 diabetes mellitus:   -Prior to admission on Lantus 35 units at bedtime with metformin 500 mg twice daily and Actos.  Was initially treated with an insulin drip and IV fluids.    -Pt has not needed any scheduled insulin therapy and received sparse sliding scale insulin.  Likely hold medications on discharge and follow-up with PCP.     Deconditioning:  -  Therapy here, needs TCU/rehab at discharge.    Pressure ulcer present upon admission: Per [facility] RN on coccyx and bilateral ischium , present on admission, Pressure Injury is Stage 3-was a stage 3 last visit as well        Discharge Disposition:  Discharged to short-term care facility     Allergies:  Allergies   Allergen Reactions     Azithromycin Hives     Ciprofloxacin      wheezing     Morphine Hives     Nitrofurantoin      Constipation, wheezing        Discharge Medications:        Review of your medicines      START taking      Dose / Directions   ferrous sulfate 325 (65 Fe) MG tablet  Commonly known as: FEROSUL  Used for: Iron deficiency anemia, unspecified iron deficiency anemia type      Dose: 325 mg  Take 1 tablet (325 mg) by mouth 2 times daily (with meals)  Refills: 0     ipratropium - albuterol 0.5 mg/2.5 mg/3 mL 0.5-2.5 (3) MG/3ML neb solution  Commonly known as: DUONEB  Used for: Chronic obstructive pulmonary disease, unspecified COPD type (H)      Dose: 3 mL  Take 1 vial (3 mLs) by nebulization every 4 hours as needed for wheezing  Quantity:    Refills: 0     NovoLOG FLEXPEN 100 UNIT/ML pen  Used for: Type 2 diabetes mellitus with other specified complication, with long-term current use of insulin (H)  Generic drug: insulin aspart      Novolog Flexpen  Give before meals and before bed:  For Pre-Meal Glucose:  140-189 give 2 unit   190-239 give 3 units   240-289 give 4 units   290-339 give 5 units   = or >340 give 6 units and contact  provider.    For Bedtime Glucose  200 - 239 give 1 units   240 - 289 give 2 units  290 - 339 give 3 units  = or >340 give 4 units and contact provider.  Quantity: 15 mL  Refills: 0     pantoprazole 40 MG EC tablet  Commonly known as: PROTONIX  Used for: Gastroesophageal reflux disease with esophagitis, unspecified whether hemorrhage      Dose: 40 mg  Take 1 tablet (40 mg) by mouth every morning (before breakfast)  Quantity:    Refills: 0     polyethylene glycol 17 g packet  Commonly known as: MIRALAX  Used for: Constipation, unspecified constipation type      Dose: 17 g  Take 17 g by mouth 2 times daily as needed for constipation Hold for loose stools.  Refills: 0     senna-docusate 8.6-50 MG tablet  Commonly known as: SENOKOT-S/PERICOLACE  Used for: Constipation, unspecified constipation type      Dose: 1 tablet  Take 1 tablet by mouth 2 times daily as needed for constipation  Quantity:    Refills: 0        CONTINUE these medicines which may have CHANGED, or have new prescriptions. If we are uncertain of the size of tablets/capsules you have at home, strength may be listed as something that might have changed.      Dose / Directions   furosemide 40 MG tablet  Commonly known as: LASIX  This may have changed:     how much to take    when to take this  Used for: Lymphedema      Dose: 40 mg  Take 1 tablet (40 mg) by mouth 2 times daily  Refills: 0        CONTINUE these medicines which have NOT CHANGED      Dose / Directions   albuterol 108 (90 Base) MCG/ACT inhaler  Commonly known as: PROAIR HFA/PROVENTIL HFA/VENTOLIN HFA      Dose: 2 puff  Inhale 2 puffs into the lungs every 4 hours as needed for shortness of breath / dyspnea or wheezing  Refills: 0     aspirin 81 MG EC tablet  Commonly known as: ASA      Dose: 81 mg  Take 81 mg by mouth daily  Refills: 0     digoxin 125 MCG tablet  Commonly known as: LANOXIN      Dose: 125 mcg  Take 125 mcg by mouth daily  Refills: 0     Dulcolax 5 MG EC tablet  Generic drug:  bisacodyl      Dose: 5 mg  Take 5 mg by mouth daily  Refills: 0     ELIQUIS ANTICOAGULANT 5 MG tablet  Generic drug: apixaban ANTICOAGULANT      Dose: 5 mg  Take 5 mg by mouth 2 times daily  Refills: 0     Flovent  MCG/ACT inhaler  Generic drug: fluticasone      Dose: 1 puff  Inhale 1 puff into the lungs 2 times daily  Refills: 0     GLUCOSAMINE-CHONDROITIN PO      Dose: 2 capsule  Take 2 capsules by mouth 2 times daily  Refills: 0     Imbruvica 140 MG capsule  Generic drug: ibrutinib      Dose: 560 mg  Take 560 mg by mouth daily  Refills: 0     METHYL SALICYLATE EX      Externally apply topically 2 times daily  Refills: 0     metoprolol tartrate 50 MG tablet  Commonly known as: LOPRESSOR      Dose: 25 mg  Take 25 mg by mouth 2 times daily  Refills: 0     MULTIVITAMIN ADULT PO      Dose: 1 tablet  Take 1 tablet by mouth daily  Refills: 0     oxybutynin 5 MG tablet  Commonly known as: DITROPAN      Dose: 5 mg  Take 5 mg by mouth 2 times daily  Refills: 0     simvastatin 10 MG tablet  Commonly known as: ZOCOR      Dose: 10 mg  Take 10 mg by mouth At Bedtime  Refills: 0     SOY ISOFLAVONE PO      Dose: 198 mg  Take 198 mg by mouth daily  Refills: 0     Zanaflex 2 MG capsule  Generic drug: tiZANidine      Dose: 2 mg  Take 2 mg by mouth 4 times daily as needed  Refills: 0     ZyrTEC Allergy 10 MG Caps  Generic drug: cetirizine HCl      Dose: 10 mg  Take 10 mg by mouth daily  Refills: 0        STOP taking    Actos 30 MG tablet  Generic drug: pioglitazone        Lantus SoloStar 100 UNIT/ML pen  Generic drug: insulin glargine        lisinopril 10 MG tablet  Commonly known as: ZESTRIL        metFORMIN 500 MG tablet  Commonly known as: GLUCOPHAGE        potassium chloride 20 MEQ packet  Commonly known as: KLOR-CON               Condition on Discharge:  Discharge condition: Stable       Code status on discharge: Full Code     History of Illness:  See detailed admission note for full details.    Physical Exam:  Vital  "signs:  Temp: 97.8  F (36.6  C) Temp src: Oral BP: 135/53 Pulse: 68   Resp: 20 SpO2: 95 % O2 Device: None (Room air) Oxygen Delivery: 2 LPM Height: 177.8 cm (5' 10\") Weight: (!) 153 kg (337 lb 4.9 oz)  Estimated body mass index is 48.4 kg/m  as calculated from the following:    Height as of this encounter: 1.778 m (5' 10\").    Weight as of this encounter: 153 kg (337 lb 4.9 oz).    Wt Readings from Last 1 Encounters:   10/21/20 (!) 153 kg (337 lb 4.9 oz)     GEN:  Alert, oriented x 3, appears comfortable, no overt distress.  HEENT:  Normocephalic/atraumatic, no scleral icterus, no nasal discharge, mouth moist.  CV:  Regular rate and rhythm, distant.  No rub.  LUNGS:  Clear to auscultation bilaterally without rales/rhonchi/wheezing/retractions.  Symmetric chest rise on inhalation noted.  ABD:  Active bowel sounds, soft, non-tender/non-distended.  No rebound/guarding/rigidity.  EXT: Lymphedema wraps in place.  No cyanosis.  No acute joint synovitis noted.    SKIN:  Dry to touch, no exanthems noted in the visualized areas.       Procedures other than Imaging:  Dialysis x 1     Imaging:  Results for orders placed or performed during the hospital encounter of 10/08/20   XR Chest Port 1 View    Narrative    EXAM: XR CHEST PORT 1 VW  LOCATION: [facility]  DATE/TIME: 10/9/2020 4:20 AM    INDICATION: Line placement.  COMPARISON: None.    FINDINGS: Supine portable chest. Right IJ central venous catheter in place with tip projecting over the distal SVC. There is no pneumothorax. The heart is at the upper limits normal in size. No pulmonary edema. Thoracic aorta is calcified and mildly   tortuous. Mild left basilar atelectasis or scar. The lungs are otherwise clear.      Impression    IMPRESSION: No pneumothorax post central line placement.                XR Chest Port 1 View    Narrative    XR CHEST PORT 1 VW 10/9/2020 12:43 PM    HISTORY: picc line placement    COMPARISON: Radiograph of earlier today      " Impression    IMPRESSION: Interval placement of a left arm PICC with tip at the  SVC/right atrial junction. Right IJ central venous catheter tip is in  the high right atrium. Stable mild cardiomegaly. No focal pulmonary  infiltrate, pleural effusion or pneumothorax. Tortuous aorta.    BELIA GAGE MD   IR CVC Tunnel Removal Right    Narrative    INTERVENTIONAL RADIOLOGY CVC TUNNEL REMOVAL RIGHT 10/13/2020 11:02 AM    HISTORY: 75-year-old woman with right internal jugular nontunneled  dialysis catheter placed several days prior by specialty other than  IR. Request made for removal.    TECHNIQUE: Patient was brought to interventional radiology department  and informed consent reiterated. Patient was placed sitting in an  upright position. The existing catheter and surrounding skin were  prepped and draped in standard sterile fashion. The sutures were cut.  The tube was removed and hemostasis achieved with manual compression.  No sedation and no local anesthetic.      Impression    IMPRESSION: Successful removal of right internal jugular nontunneled  dialysis catheter.    SAYRA SEALS MD        Consultations:  Consultation during this admission received from nephrology.       Recent Lab Results:  Recent Labs   Lab 10/18/20  0625 10/15/20  0545   HGB 7.2* 7.5*          Lab Results   Component Value Date     10/18/2020     10/17/2020     10/15/2020    Lab Results   Component Value Date    CHLORIDE 103 10/18/2020    CHLORIDE 104 10/17/2020    CHLORIDE 104 10/15/2020    Lab Results   Component Value Date    BUN 17 10/18/2020    BUN 16 10/17/2020    BUN 19 10/15/2020      Lab Results   Component Value Date    POTASSIUM 3.7 10/18/2020    POTASSIUM 3.8 10/17/2020    POTASSIUM 4.3 10/15/2020    Lab Results   Component Value Date    CO2 30 10/18/2020    CO2 28 10/17/2020    CO2 29 10/15/2020    Lab Results   Component Value Date    CR 0.97 10/20/2020    CR 1.06 10/18/2020    CR 1.04 10/17/2020              Pending Results:    Unresulted Labs Ordered in the Past 30 Days of this Admission     No orders found from 9/8/2020 to 10/9/2020.           Discharge Instructions and Follow-Up:   Discharge Procedure Orders   General info for SNF   Order Comments: Length of Stay Estimate: Short Term Care: Estimated # of Days <30  Condition at Discharge: Improving  Level of care:skilled   Rehabilitation Potential: Good  Admission H&P remains valid and up-to-date: Yes  Recent Chemotherapy: N/A  Use Nursing Home Standing Orders: yes     Mantoux instructions   Order Comments: Give two-step Mantoux (PPD) Per Facility Policy Yes     Reason for your hospital stay   Order Comments: You were admitted for acute kidney failure with hyperkalemia (high potassium).  This was treated with dialysis and medications.  Thankfully, your kidneys have recovered quite well but you will need to avoid certain medications for now including nsaids, ace-inhibitors, potassium supplements etc.     Daily weights   Order Comments: Call Provider for weight gain of more than 2 pounds per day or 5 pounds per week.     Follow Up and recommended labs and tests   Order Comments: Follow up with skilled nursing physician.  The following labs/tests are recommended: CBC and basic metabolic panel within 3-4 days.  Also, please review your blood sugar trend/insulin requirement as previously you were on much more insulin than you are now and this may change as you recover further.     Activity - Up with nursing assistance     Order Specific Question Answer Comments   Is discharge order? Yes      Full Code     Order Specific Question Answer Comments   Code status determined by: Other (please document)      Physical Therapy Adult Consult   Order Comments: Evaluate and treat as clinically indicated.    Reason:  Physical deconditioning     Occupational Therapy Adult Consult   Order Comments: Evaluate and treat as clinically indicated.    Reason:  Physical deconditioning, critical  illness     Fall precautions     Advance Diet as Tolerated   Order Comments: Follow this diet upon discharge: Orders Placed This Encounter      Room Service      Regular Diet Adult     Order Specific Question Answer Comments   Is discharge order? Yes        Total time spent in face to face contact with the patient and coordinating discharge was:  50 Minutes.

## 2020-10-21 NOTE — PLAN OF CARE
Vitals: VSS. Afebrile. Denies pain.   Labs:  and 137  Neuro: A&O x4. Calm, cooperative.   Respiratory: Lung sounds diminished. Denies cough.   Cardiac: Apical pulse irregular. Denies chest pain. Edema to BUE and BLE  GI/: Inc of urine, purewick in place. No BMs this shift.   Skin: Wound care completed per orders   LDAs: No IV access  Diet: Regular  Activity: A2 lift  Teaching: POC reviewed with pt. Questions asked and answered.   Plan: Transferring to [facility] today. Transport set up for 1530. Continue with POC.

## 2020-10-21 NOTE — PLAN OF CARE
Physical Therapy Discharge Summary    Reason for therapy discharge:    Discharged to transitional care facility.    Progress towards therapy goal(s). See goals on Care Plan in [facility] electronic health record for goal details.  Goals not met.  Barriers to achieving goals:   discharge from facility.    Therapy recommendation(s):    TCU was recommended.

## 2020-10-22 ENCOUNTER — NURSING HOME VISIT (OUTPATIENT)
Dept: GERIATRICS | Facility: CLINIC | Age: 75
End: 2020-10-22
Payer: MEDICARE

## 2020-10-22 VITALS
BODY MASS INDEX: 41.95 KG/M2 | WEIGHT: 293 LBS | HEART RATE: 74 BPM | RESPIRATION RATE: 18 BRPM | OXYGEN SATURATION: 93 % | SYSTOLIC BLOOD PRESSURE: 121 MMHG | HEIGHT: 70 IN | DIASTOLIC BLOOD PRESSURE: 74 MMHG | TEMPERATURE: 98.2 F

## 2020-10-22 DIAGNOSIS — R53.81 PHYSICAL DECONDITIONING: ICD-10-CM

## 2020-10-22 DIAGNOSIS — E66.01 MORBID OBESITY (H): ICD-10-CM

## 2020-10-22 DIAGNOSIS — K59.03 DRUG-INDUCED CONSTIPATION: ICD-10-CM

## 2020-10-22 DIAGNOSIS — I10 ESSENTIAL HYPERTENSION: ICD-10-CM

## 2020-10-22 DIAGNOSIS — E11.42 TYPE 2 DIABETES MELLITUS WITH DIABETIC POLYNEUROPATHY, WITH LONG-TERM CURRENT USE OF INSULIN (H): ICD-10-CM

## 2020-10-22 DIAGNOSIS — C83.10 MANTLE CELL LYMPHOMA, UNSPECIFIED BODY REGION (H): ICD-10-CM

## 2020-10-22 DIAGNOSIS — L89.153 PRESSURE INJURY OF SACRAL REGION, STAGE 3 (H): ICD-10-CM

## 2020-10-22 DIAGNOSIS — E87.20 METABOLIC ACIDOSIS: ICD-10-CM

## 2020-10-22 DIAGNOSIS — Z79.4 TYPE 2 DIABETES MELLITUS WITH DIABETIC POLYNEUROPATHY, WITH LONG-TERM CURRENT USE OF INSULIN (H): ICD-10-CM

## 2020-10-22 DIAGNOSIS — I48.19 PERSISTENT ATRIAL FIBRILLATION (H): ICD-10-CM

## 2020-10-22 DIAGNOSIS — N17.9 ACUTE RENAL FAILURE SUPERIMPOSED ON STAGE 3 CHRONIC KIDNEY DISEASE, UNSPECIFIED ACUTE RENAL FAILURE TYPE, UNSPECIFIED WHETHER STAGE 3A OR 3B CKD (H): ICD-10-CM

## 2020-10-22 DIAGNOSIS — J44.9 CHRONIC OBSTRUCTIVE PULMONARY DISEASE, UNSPECIFIED COPD TYPE (H): ICD-10-CM

## 2020-10-22 DIAGNOSIS — E87.5 HYPERKALEMIA: ICD-10-CM

## 2020-10-22 DIAGNOSIS — N18.30 STAGE 3 CHRONIC KIDNEY DISEASE, UNSPECIFIED WHETHER STAGE 3A OR 3B CKD (H): ICD-10-CM

## 2020-10-22 DIAGNOSIS — J45.909 UNCOMPLICATED ASTHMA, UNSPECIFIED ASTHMA SEVERITY, UNSPECIFIED WHETHER PERSISTENT: ICD-10-CM

## 2020-10-22 DIAGNOSIS — M17.0 PRIMARY OSTEOARTHRITIS OF BOTH KNEES: ICD-10-CM

## 2020-10-22 DIAGNOSIS — D50.0 IRON DEFICIENCY ANEMIA DUE TO CHRONIC BLOOD LOSS: Primary | ICD-10-CM

## 2020-10-22 DIAGNOSIS — N18.30 ACUTE RENAL FAILURE SUPERIMPOSED ON STAGE 3 CHRONIC KIDNEY DISEASE, UNSPECIFIED ACUTE RENAL FAILURE TYPE, UNSPECIFIED WHETHER STAGE 3A OR 3B CKD (H): ICD-10-CM

## 2020-10-22 DIAGNOSIS — I89.0 LYMPHEDEMA: ICD-10-CM

## 2020-10-22 PROCEDURE — 99310 SBSQ NF CARE HIGH MDM 45: CPT | Performed by: NURSE PRACTITIONER

## 2020-10-22 RX ORDER — FERROUS SULFATE 325(65) MG
325 TABLET ORAL
Start: 2020-10-22 | End: 2020-11-17

## 2020-10-22 RX ORDER — ACETAMINOPHEN 325 MG/1
975 TABLET ORAL 3 TIMES DAILY PRN
Start: 2020-10-22 | End: 2020-10-28

## 2020-10-22 ASSESSMENT — MIFFLIN-ST. JEOR: SCORE: 2060.79

## 2020-10-22 NOTE — LETTER
10/22/2020        RE: Waleska Marie  [address]  [place] [address]        [facility]  PRIMARY CARE PROVIDER AND CLINIC:  [facility] [address] / [place] [address]  Chief Complaint   Patient presents with     Hospital F/U     [facility] Record Number:  [id number]  Place of Service where encounter took place:  [facility]  (S) [[id number]]    Waleska Marie  is a 75 year old  (1945), admitted to the above facility from  [facility]. Hospital stay 10/08/2020 through 10/21/2020..  Admitted to this facility for  rehab, medical management and nursing care.    HPI:    HPI information obtained from: facility chart records, facility staff, patient report and [facility] chart review.   Brief Summary of Hospital Course:     Patient is a 75 year old female with PMH significant for DMII, CKD stage III, COPD, asthma, anemia, mantle cell lymphoma, morbid obesity, a-fib, and severe lymphedema.  She was admitted on 10/8/20 from an outside facility with non-anion gap metabolic acidosis, acute kidney injury and severe hyperkalemia refractory to medications.  Her creatinine was 2.75, K+ 7.9 and bicarb 12.  She was treated with insulin, D50, IV lasix, albuteral nebs and calcium gluconate, but ultimately required emergent dialysis on 10/9 due to no improvement.  She completed only 1 session of dialysis and her renal function and hyperkalemia slowly improved.    She was also noted to have acute on chronic anemia, Hgb at 5.8, received 2 units PRBC's.  No acute s/s of active bleeding. Iron profile low, so started on iron and PPI    Many of her PTA meds continued to be held, including her Lantus, metformin and Actos.  Her BS were well controlled inpatient with little to no sliding scale needed    Updates on Status Since Skilled nursing Admission:     Patient is seen in her room in bed this morning after therapy and reports feeling rather  "frustrated.  She did not sleep well last night, and felt someone was constantly waking her up.  She reports chronic shortness of breath r/t to her asthma, but states feels significant improvement with her inhalers, slight occasional cough in the morning which is common for her.  She reports chronic knee pain to bilateral knees, and requests to be allowed to take \"3 tabs of Tylenol, instead of 2, because it helps so much more\".  She states her appetite is fair, denies diarrhea or constipation today, she had a BM this morning, unsure of when her last BM was.  Denies/no reports of acute s/s bleeding.  She tells me she was shocked to find out she was anemic when inpatient    She has chronic skin breakdown on her bottom and tells me it has been present for years.  She admits that when she was home, she primarily sits in her chair, only getting up to use the bathroom, she also sleeps in her chair when at home.     Chronic lymphedema to BLLE, when she was at home, tells me that her dressings were often chronically wet, she feels they have been better lately.  She wants to get stronger fast so she can go back home.  Nursing without acute concerns today     CODE STATUS/ADVANCE DIRECTIVES DISCUSSION:   CPR/Full code   Patient's living condition: lives with adult children in a mobile home  ALLERGIES: Azithromycin, Ciprofloxacin, Morphine, and Nitrofurantoin  PAST MEDICAL HISTORY:  has no past medical history on file.  PAST SURGICAL HISTORY:   has a past surgical history that includes IR CVC Tunnel Removal Right (10/13/2020).  FAMILY HISTORY: family history is not on file.  SOCIAL HISTORY:       Post Discharge Medication Reconciliation Status: discharge medications reconciled and changed, per note/orders    Current Outpatient Medications   Medication Sig Dispense Refill     acetaminophen (TYLENOL) 325 MG tablet Take 3 tablets (975 mg) by mouth 3 times daily as needed for mild pain       albuterol (PROAIR HFA/PROVENTIL " HFA/VENTOLIN HFA) 108 (90 BASE) MCG/ACT Inhaler Inhale 2 puffs into the lungs every 4 hours as needed for shortness of breath / dyspnea or wheezing       apixaban ANTICOAGULANT (ELIQUIS) 5 MG tablet Take 5 mg by mouth 2 times daily       aspirin 81 MG EC tablet Take 81 mg by mouth daily       bisacodyl (DULCOLAX) 5 MG EC tablet Take 5 mg by mouth daily        cetirizine HCl (ZYRTEC ALLERGY) 10 MG CAPS Take 5 mg by mouth daily       digoxin (LANOXIN) 125 MCG tablet Take 125 mcg by mouth daily       ferrous sulfate (FEROSUL) 325 (65 Fe) MG tablet Take 1 tablet (325 mg) by mouth daily (with breakfast)       fluticasone (FLOVENT HFA) 220 MCG/ACT Inhaler Inhale 1 puff into the lungs 2 times daily       furosemide (LASIX) 40 MG tablet Take 1 tablet (40 mg) by mouth 2 times daily       GLUCOSAMINE-CHONDROITIN PO Take 2 capsules by mouth 2 times daily       ibrutinib (IMBRUVICA) 140 MG capsule Take 560 mg by mouth daily        insulin aspart (NOVOLOG FLEXPEN) 100 UNIT/ML pen Novolog Flexpen  Give before meals and before bed:  For Pre-Meal Glucose:  140-189 give 2 unit   190-239 give 3 units   240-289 give 4 units   290-339 give 5 units   = or >340 give 6 units and contact provider.    For Bedtime Glucose  200 - 239 give 1 units   240 - 289 give 2 units  290 - 339 give 3 units  = or >340 give 4 units and contact provider. 15 mL 0     ipratropium - albuterol 0.5 mg/2.5 mg/3 mL (DUONEB) 0.5-2.5 (3) MG/3ML neb solution Take 1 vial (3 mLs) by nebulization every 4 hours as needed for wheezing       METHYL SALICYLATE EX Externally apply topically 2 times daily        metoprolol (LOPRESSOR) 50 MG tablet Take 25 mg by mouth 2 times daily       Multiple Vitamins-Minerals (MULTIVITAMIN ADULT PO) Take 1 tablet by mouth daily        oxybutynin (DITROPAN) 5 MG tablet Take 5 mg by mouth 2 times daily       pantoprazole (PROTONIX) 40 MG EC tablet Take 1 tablet (40 mg) by mouth every morning (before breakfast)       polyethylene glycol  "(MIRALAX) 17 g packet Take 17 g by mouth 2 times daily as needed for constipation Hold for loose stools.       senna-docusate (SENOKOT-S/PERICOLACE) 8.6-50 MG tablet Take 1 tablet by mouth 2 times daily as needed for constipation       simvastatin (ZOCOR) 10 MG tablet Take 10 mg by mouth At Bedtime       SOY ISOFLAVONE PO Take 198 mg by mouth daily       TiZANidine HCl (ZANAFLEX) 2 MG CAPS Take 2 mg by mouth 4 times daily as needed            ROS:  10 point ROS of systems including Constitutional, Eyes, Respiratory, Cardiovascular, Gastroenterology, Genitourinary, Integumentary, Musculoskeletal, Psychiatric were all negative except for pertinent positives noted in my HPI.    Vitals:  /74   Pulse 74   Temp 98.2  F (36.8  C)   Resp 18   Ht 1.778 m (5' 10\")   Wt 148.1 kg (326 lb 6.4 oz)   SpO2 93%   BMI 46.83 kg/m    Exam:  GENERAL APPEARANCE:  Alert, in no distress, morbidly obese, frustrated  ENT:  Mouth and posterior oropharynx normal, moist mucous membranes  EYES:  EOM, conjunctivae, lids, pupils and irises normal  NECK:  No adenopathy,masses or thyromegaly  RESP:  respiratory effort and palpation of chest normal, lungs clear to auscultation , no respiratory distress, on room air, no cough  CV:  Palpation and auscultation of heart done , regular rate and rhythm, no murmur, rub, or gallop, bilateral lymphedema, wraps in place and dry   ABDOMEN:  obeses, non-tender to light and deep palpation , no guarding or rebound, bowel sounds normal  M/S:   Gait and station abnormal generalized weakness, ROM reduced to upper and lower extremities, no edema/erythema joints  SKIN:  thin, fragile, eccymotic, candidiasis noted to skin folds under breasts, abdomen, unable to visualize coccyx, pt in bed and not wanting to be repositioned at this time  NEURO:   Cranial nerves 2-12 are normal tested and grossly at patient's baseline  PSYCH:  alert, frustrated, orientated to person, place and time    Lab/Diagnostic " data:  Recent labs in [facility] reviewed by me today.     ASSESSMENT/PLAN:  (D50.0) Iron deficiency anemia due to chronic blood loss  (primary encounter diagnosis)  Comment: acute on chronic anemia noted inpatient, Hgb was initially 5.8  -received 2 units PBC's  -started on iron and PPI  -no acute s/s bleeding noted  -baseline hgb 8-9 ( in 2017)  -gastritis noted during EUS in May, Neg H Pylori, last colonoscopy from 2012  Plan: decrease ferrous sulfate from BID to every day, patient requests this and reasonable given absorption not likley increased by taking more then 65mg/day (patient was previously on iron supplement and stopped)  -needs colonoscopy in near future, discuss with PCP  -continue PPI  -hgb tomorrow (lab does not come on weekends)  -staff update with acute s/s blood loss   -consider d'c asa in future    (M17.0) Primary osteoarthritis of both knees  Comment: per report, dull achy knees, requests to take 3 tabs of APAP, reports this helps more then 2   Plan: acetaminophen (TYLENOL) 325 MG tablet        Ok to take 3 tabs TID prn, topical pain med    (E66.01) Morbid obesity (H)  Comment: contributes to other comorbidities  Plan: monitor weights, encourage healthy eating choices    (C83.10) Mantle cell lymphoma, unspecified body region (H)  Comment: per hx  Plan: continue Imbruvica  -follow with oncology per their recommendation     (I48.19) Persistent atrial fibrillation (H)  Comment: on Eliquis for anticoagulation  -hgb was stable inpatient  -rate control with digoxin and metoprolol   Plan: ok to continue Eliquis at this time, monitor Hgb closely  -rate control with above meds, monitor HR  -consider digoxin level sometime next week     (N17.9,  N18.30) Acute renal failure superimposed on stage 3 chronic kidney disease, unspecified acute renal failure type, unspecified whether stage 3a or 3b CKD (H)  (N18.30) Stage 3 chronic kidney disease, unspecified whether stage 3a or 3b CKD  (E87.2) Metabolic  acidosis  Comment: creatinine up to 2.75 on admission, baseline around 1.3  -required emergent dialysis inpatient  Lab Results   Component Value Date    CR 0.97 10/20/2020     Plan: renally adjust meds, avoid nephrotoxic meds  -BMP tomorrow  -decreasing cetirizine to 5mg given age and renal function     (I89.0) Lymphedema  Comment: significant lymphedema, with wraps in place  Plan: continue lymph wraps, OT/PT  -on lasix, dose recently increased to PTA only split in 2 doses instead of 1  -monitor, assess for areas of breakdown    (E11.42,  Z79.4) Type 2 diabetes mellitus with diabetic polyneuropathy, with long-term current use of insulin (H)  Comment: was previously on Lantus, metformin and Actos  -while inpatient, patient infrequently required insulin via sliding scale, appetite decreased?  -minimal BS readings available, blood sugars today 124, 188, 200  Plan: continue to monitor BS closely and monitor, may need to assess for insulin in the future     (J44.9) Chronic obstructive pulmonary disease, unspecified COPD type (H)  (J45.909) Uncomplicated asthma, unspecified asthma severity, unspecified whether persistent  Comment: per history, reports controlled with her current medications  -sating well on room air, afebrile, no cough noted today   Plan: continue albuterol prn, prn duonebs, fluticasone   -staff update with concerns breathing   -cetirizine dose reduced as noted above    (E87.5) Hyperkalemia  Comment: admitted with K+ at 7.5  -required emergent dialysis on 10/10  -thought to be refractory to meds, Lisinopril metformin and KCL continue on hold  Plan: continue to hold PTA meds as above  -BMP tomorrow 10/23 (lab not here on weekend and recommend f/u labs in 2-3 days) reassess meds prn     (R53.81) Physical deconditioning  Comment: recent hospitalization  Plan: therapy for strengthening and conditioning    (I10) Essential hypertension  Comment: PTA lisinopril on hold  -BP with good control since admission,  although limited data at this point -136 SBP 61-82  Plan: continue to monitor BP and reassess meds prn       (K59.03) Drug-induced constipation  Comment: noted after start on iron  -had BM today  Plan: continue scheduled dulcolax  -has multiple prn bowel meds  -monitor bowels closely, staff to give prn if no BM in 2 days  -iron reduced from BID to every day per patient request as noted above     (L89.153) Pressure injury of sacral region, stage 3 (H)  Comment: long-standing, spends most of day sitting, also likely some moisture related breakdown  -unable to visualize today, patient declines   Plan: calmoseptine  -air mattress  -dietary consulted and starting Boost glucose control  -Turn and reposition every 2 hours    Candidiasis  Comment: to multiple skin folds  Plan: - Cleanse skin between folds with gentle cleanser, pat dry, place InterDry. Change daily.        Total time spent with patient visit at the skilled nursing facility was 40 minutes including patient visit, review of past records and discussion with nursing staff. Greater than 50% of total time spent with counseling and coordinating care due to review of hospitalization, discussion on wound care, monitoring of BP and BS as well as labs with patient and nursing staff.     Electronically signed by:  GASTON Bethea CNP                       Sincerely,        GASTON Bethea CNP

## 2020-10-22 NOTE — PROGRESS NOTES
[place] GERIATRIC SERVICES  PRIMARY CARE PROVIDER AND CLINIC:  [facility] [address] [facility] / [facility] [address]  Chief Complaint   Patient presents with     Hospital F/U     [place] Medical Record Number:  [id number]  Place of Service where encounter took place:  [facility]  ([facility]) [[id number]]    Waleska Marie  is a 75 year old  (1945), admitted to the above facility from  [facility]. Hospital stay 10/08/2020 through 10/21/2020..  Admitted to this facility for  rehab, medical management and nursing care.    HPI:    HPI information obtained from: facility chart records, facility staff, patient report and [facility] chart review.   Brief Summary of Hospital Course:     Patient is a 75 year old female with PMH significant for DMII, CKD stage III, COPD, asthma, anemia, mantle cell lymphoma, morbid obesity, a-fib, and severe lymphedema.  She was admitted on 10/8/20 from an outside facility with non-anion gap metabolic acidosis, acute kidney injury and severe hyperkalemia refractory to medications.  Her creatinine was 2.75, K+ 7.9 and bicarb 12.  She was treated with insulin, D50, IV lasix, albuteral nebs and calcium gluconate, but ultimately required emergent dialysis on 10/9 due to no improvement.  She completed only 1 session of dialysis and her renal function and hyperkalemia slowly improved.    She was also noted to have acute on chronic anemia, Hgb at 5.8, received 2 units PRBC's.  No acute s/s of active bleeding. Iron profile low, so started on iron and PPI    Many of her PTA meds continued to be held, including her Lantus, metformin and Actos.  Her BS were well controlled inpatient with little to no sliding scale needed    Updates on Status Since Skilled nursing Admission:     Patient is seen in her room in bed this morning after therapy and reports feeling rather frustrated.  She did not sleep well last night, and felt someone was constantly waking  "her up.  She reports chronic shortness of breath r/t to her asthma, but states feels significant improvement with her inhalers, slight occasional cough in the morning which is common for her.  She reports chronic knee pain to bilateral knees, and requests to be allowed to take \"3 tabs of Tylenol, instead of 2, because it helps so much more\".  She states her appetite is fair, denies diarrhea or constipation today, she had a BM this morning, unsure of when her last BM was.  Denies/no reports of acute s/s bleeding.  She tells me she was shocked to find out she was anemic when inpatient    She has chronic skin breakdown on her bottom and tells me it has been present for years.  She admits that when she was home, she primarily sits in her chair, only getting up to use the bathroom, she also sleeps in her chair when at home.     Chronic lymphedema to BLLE, when she was at home, tells me that her dressings were often chronically wet, she feels they have been better lately.  She wants to get stronger fast so she can go back home.  Nursing without acute concerns today     CODE STATUS/ADVANCE DIRECTIVES DISCUSSION:   CPR/Full code   Patient's living condition: lives with adult children in a mobile home  ALLERGIES: Azithromycin, Ciprofloxacin, Morphine, and Nitrofurantoin  PAST MEDICAL HISTORY:  has no past medical history on file.  PAST SURGICAL HISTORY:   has a past surgical history that includes IR CVC Tunnel Removal Right (10/13/2020).  FAMILY HISTORY: family history is not on file.  SOCIAL HISTORY:       Post Discharge Medication Reconciliation Status: discharge medications reconciled and changed, per note/orders    Current Outpatient Medications   Medication Sig Dispense Refill     acetaminophen (TYLENOL) 325 MG tablet Take 3 tablets (975 mg) by mouth 3 times daily as needed for mild pain       albuterol (PROAIR HFA/PROVENTIL HFA/VENTOLIN HFA) 108 (90 BASE) MCG/ACT Inhaler Inhale 2 puffs into the lungs every 4 hours as " needed for shortness of breath / dyspnea or wheezing       apixaban ANTICOAGULANT (ELIQUIS) 5 MG tablet Take 5 mg by mouth 2 times daily       aspirin 81 MG EC tablet Take 81 mg by mouth daily       bisacodyl (DULCOLAX) 5 MG EC tablet Take 5 mg by mouth daily        cetirizine HCl (ZYRTEC ALLERGY) 10 MG CAPS Take 5 mg by mouth daily       digoxin (LANOXIN) 125 MCG tablet Take 125 mcg by mouth daily       ferrous sulfate (FEROSUL) 325 (65 Fe) MG tablet Take 1 tablet (325 mg) by mouth daily (with breakfast)       fluticasone (FLOVENT HFA) 220 MCG/ACT Inhaler Inhale 1 puff into the lungs 2 times daily       furosemide (LASIX) 40 MG tablet Take 1 tablet (40 mg) by mouth 2 times daily       GLUCOSAMINE-CHONDROITIN PO Take 2 capsules by mouth 2 times daily       ibrutinib (IMBRUVICA) 140 MG capsule Take 560 mg by mouth daily        insulin aspart (NOVOLOG FLEXPEN) 100 UNIT/ML pen Novolog Flexpen  Give before meals and before bed:  For Pre-Meal Glucose:  140-189 give 2 unit   190-239 give 3 units   240-289 give 4 units   290-339 give 5 units   = or >340 give 6 units and contact provider.    For Bedtime Glucose  200 - 239 give 1 units   240 - 289 give 2 units  290 - 339 give 3 units  = or >340 give 4 units and contact provider. 15 mL 0     ipratropium - albuterol 0.5 mg/2.5 mg/3 mL (DUONEB) 0.5-2.5 (3) MG/3ML neb solution Take 1 vial (3 mLs) by nebulization every 4 hours as needed for wheezing       METHYL SALICYLATE EX Externally apply topically 2 times daily        metoprolol (LOPRESSOR) 50 MG tablet Take 25 mg by mouth 2 times daily       Multiple Vitamins-Minerals (MULTIVITAMIN ADULT PO) Take 1 tablet by mouth daily        oxybutynin (DITROPAN) 5 MG tablet Take 5 mg by mouth 2 times daily       pantoprazole (PROTONIX) 40 MG EC tablet Take 1 tablet (40 mg) by mouth every morning (before breakfast)       polyethylene glycol (MIRALAX) 17 g packet Take 17 g by mouth 2 times daily as needed for constipation Hold for loose  "stools.       senna-docusate (SENOKOT-S/PERICOLACE) 8.6-50 MG tablet Take 1 tablet by mouth 2 times daily as needed for constipation       simvastatin (ZOCOR) 10 MG tablet Take 10 mg by mouth At Bedtime       SOY ISOFLAVONE PO Take 198 mg by mouth daily       TiZANidine HCl (ZANAFLEX) 2 MG CAPS Take 2 mg by mouth 4 times daily as needed            ROS:  10 point ROS of systems including Constitutional, Eyes, Respiratory, Cardiovascular, Gastroenterology, Genitourinary, Integumentary, Musculoskeletal, Psychiatric were all negative except for pertinent positives noted in my HPI.    Vitals:  /74   Pulse 74   Temp 98.2  F (36.8  C)   Resp 18   Ht 1.778 m (5' 10\")   Wt 148.1 kg (326 lb 6.4 oz)   SpO2 93%   BMI 46.83 kg/m    Exam:  GENERAL APPEARANCE:  Alert, in no distress, morbidly obese, frustrated  ENT:  Mouth and posterior oropharynx normal, moist mucous membranes  EYES:  EOM, conjunctivae, lids, pupils and irises normal  NECK:  No adenopathy,masses or thyromegaly  RESP:  respiratory effort and palpation of chest normal, lungs clear to auscultation , no respiratory distress, on room air, no cough  CV:  Palpation and auscultation of heart done , regular rate and rhythm, no murmur, rub, or gallop, bilateral lymphedema, wraps in place and dry   ABDOMEN:  obeses, non-tender to light and deep palpation , no guarding or rebound, bowel sounds normal  M/S:   Gait and station abnormal generalized weakness, ROM reduced to upper and lower extremities, no edema/erythema joints  SKIN:  thin, fragile, eccymotic, candidiasis noted to skin folds under breasts, abdomen, unable to visualize coccyx, pt in bed and not wanting to be repositioned at this time  NEURO:   Cranial nerves 2-12 are normal tested and grossly at patient's baseline  PSYCH:  alert, frustrated, orientated to person, place and time    Lab/Diagnostic data:  Recent labs in [facility] reviewed by me today.     ASSESSMENT/PLAN:  (D50.0) Iron deficiency anemia due " to chronic blood loss  (primary encounter diagnosis)  Comment: acute on chronic anemia noted inpatient, Hgb was initially 5.8  -received 2 units PBC's  -started on iron and PPI  -no acute s/s bleeding noted  -baseline hgb 8-9 ( in 2017)  -gastritis noted during EUS in May, Neg H Pylori, last colonoscopy from 2012  Plan: decrease ferrous sulfate from BID to every day, patient requests this and reasonable given absorption not likley increased by taking more then 65mg/day (patient was previously on iron supplement and stopped)  -needs colonoscopy in near future, discuss with PCP  -continue PPI  -hgb tomorrow (lab does not come on weekends)  -staff update with acute s/s blood loss   -consider d'c asa in future    (M17.0) Primary osteoarthritis of both knees  Comment: per report, dull achy knees, requests to take 3 tabs of APAP, reports this helps more then 2   Plan: acetaminophen (TYLENOL) 325 MG tablet        Ok to take 3 tabs TID prn, topical pain med    (E66.01) Morbid obesity (H)  Comment: contributes to other comorbidities  Plan: monitor weights, encourage healthy eating choices    (C83.10) Mantle cell lymphoma, unspecified body region (H)  Comment: per hx  Plan: continue Imbruvica  -follow with oncology per their recommendation     (I48.19) Persistent atrial fibrillation (H)  Comment: on Eliquis for anticoagulation  -hgb was stable inpatient  -rate control with digoxin and metoprolol   Plan: ok to continue Eliquis at this time, monitor Hgb closely  -rate control with above meds, monitor HR  -consider digoxin level sometime next week     (N17.9,  N18.30) Acute renal failure superimposed on stage 3 chronic kidney disease, unspecified acute renal failure type, unspecified whether stage 3a or 3b CKD (H)  (N18.30) Stage 3 chronic kidney disease, unspecified whether stage 3a or 3b CKD  (E87.2) Metabolic acidosis  Comment: creatinine up to 2.75 on admission, baseline around 1.3  -required emergent dialysis inpatient  Lab  Results   Component Value Date    CR 0.97 10/20/2020     Plan: renally adjust meds, avoid nephrotoxic meds  -BMP tomorrow  -decreasing cetirizine to 5mg given age and renal function     (I89.0) Lymphedema  Comment: significant lymphedema, with wraps in place  Plan: continue lymph wraps, OT/PT  -on lasix, dose recently increased to PTA only split in 2 doses instead of 1  -monitor, assess for areas of breakdown    (E11.42,  Z79.4) Type 2 diabetes mellitus with diabetic polyneuropathy, with long-term current use of insulin (H)  Comment: was previously on Lantus, metformin and Actos  -while inpatient, patient infrequently required insulin via sliding scale, appetite decreased?  -minimal BS readings available, blood sugars today 124, 188, 200  Plan: continue to monitor BS closely and monitor, may need to assess for insulin in the future     (J44.9) Chronic obstructive pulmonary disease, unspecified COPD type (H)  (J45.909) Uncomplicated asthma, unspecified asthma severity, unspecified whether persistent  Comment: per history, reports controlled with her current medications  -sating well on room air, afebrile, no cough noted today   Plan: continue albuterol prn, prn duonebs, fluticasone   -staff update with concerns breathing   -cetirizine dose reduced as noted above    (E87.5) Hyperkalemia  Comment: admitted with K+ at 7.5  -required emergent dialysis on 10/10  -thought to be refractory to meds, Lisinopril metformin and KCL continue on hold  Plan: continue to hold PTA meds as above  -BMP tomorrow 10/23 (lab not here on weekend and recommend f/u labs in 2-3 days) reassess meds prn     (R53.81) Physical deconditioning  Comment: recent hospitalization  Plan: therapy for strengthening and conditioning    (I10) Essential hypertension  Comment: PTA lisinopril on hold  -BP with good control since admission, although limited data at this point -136 SBP 61-82  Plan: continue to monitor BP and reassess meds prn        (K59.03) Drug-induced constipation  Comment: noted after start on iron  -had BM today  Plan: continue scheduled dulcolax  -has multiple prn bowel meds  -monitor bowels closely, staff to give prn if no BM in 2 days  -iron reduced from BID to every day per patient request as noted above     (L89.153) Pressure injury of sacral region, stage 3 (H)  Comment: long-standing, spends most of day sitting, also likely some moisture related breakdown  -unable to visualize today, patient declines   Plan: calmoseptine  -air mattress  -dietary consulted and starting Boost glucose control  -Turn and reposition every 2 hours    Candidiasis  Comment: to multiple skin folds  Plan: - Cleanse skin between folds with gentle cleanser, pat dry, place InterDry. Change daily.        Total time spent with patient visit at the skilled nursing facility was 40 minutes including patient visit, review of past records and discussion with nursing staff. Greater than 50% of total time spent with counseling and coordinating care due to review of hospitalization, discussion on wound care, monitoring of BP and BS as well as labs with patient and nursing staff.     Electronically signed by:  GASTON Bethea CNP

## 2020-10-23 ENCOUNTER — NURSING HOME VISIT (OUTPATIENT)
Dept: GERIATRICS | Facility: CLINIC | Age: 75
End: 2020-10-23
Payer: MEDICARE

## 2020-10-23 ENCOUNTER — TELEPHONE (OUTPATIENT)
Dept: GERIATRICS | Facility: CLINIC | Age: 75
End: 2020-10-23

## 2020-10-23 ENCOUNTER — HOSPITAL LABORATORY (OUTPATIENT)
Dept: OTHER | Facility: CLINIC | Age: 75
End: 2020-10-23

## 2020-10-23 VITALS
SYSTOLIC BLOOD PRESSURE: 113 MMHG | HEIGHT: 70 IN | BODY MASS INDEX: 41.95 KG/M2 | OXYGEN SATURATION: 94 % | RESPIRATION RATE: 18 BRPM | DIASTOLIC BLOOD PRESSURE: 54 MMHG | HEART RATE: 78 BPM | WEIGHT: 293 LBS | TEMPERATURE: 97.7 F

## 2020-10-23 DIAGNOSIS — S91.001D OPEN WOUND OF RIGHT KNEE, LEG, AND ANKLE, SUBSEQUENT ENCOUNTER: ICD-10-CM

## 2020-10-23 DIAGNOSIS — Z79.4 TYPE 2 DIABETES MELLITUS WITH STAGE 3 CHRONIC KIDNEY DISEASE, WITH LONG-TERM CURRENT USE OF INSULIN, UNSPECIFIED WHETHER STAGE 3A OR 3B CKD (H): ICD-10-CM

## 2020-10-23 DIAGNOSIS — N18.30 TYPE 2 DIABETES MELLITUS WITH STAGE 3 CHRONIC KIDNEY DISEASE, WITH LONG-TERM CURRENT USE OF INSULIN, UNSPECIFIED WHETHER STAGE 3A OR 3B CKD (H): ICD-10-CM

## 2020-10-23 DIAGNOSIS — E78.5 HYPERLIPIDEMIA, UNSPECIFIED HYPERLIPIDEMIA TYPE: ICD-10-CM

## 2020-10-23 DIAGNOSIS — K59.01 SLOW TRANSIT CONSTIPATION: ICD-10-CM

## 2020-10-23 DIAGNOSIS — E11.22 TYPE 2 DIABETES MELLITUS WITH STAGE 3 CHRONIC KIDNEY DISEASE, WITH LONG-TERM CURRENT USE OF INSULIN, UNSPECIFIED WHETHER STAGE 3A OR 3B CKD (H): ICD-10-CM

## 2020-10-23 DIAGNOSIS — R53.81 PHYSICAL DECONDITIONING: ICD-10-CM

## 2020-10-23 DIAGNOSIS — I48.20 CHRONIC ATRIAL FIBRILLATION (H): ICD-10-CM

## 2020-10-23 DIAGNOSIS — E66.01 MORBID OBESITY (H): ICD-10-CM

## 2020-10-23 DIAGNOSIS — N18.9 ACUTE KIDNEY INJURY SUPERIMPOSED ON CKD (H): Primary | ICD-10-CM

## 2020-10-23 DIAGNOSIS — D64.9 ACUTE ON CHRONIC ANEMIA: ICD-10-CM

## 2020-10-23 DIAGNOSIS — J44.9 CHRONIC OBSTRUCTIVE PULMONARY DISEASE, UNSPECIFIED COPD TYPE (H): ICD-10-CM

## 2020-10-23 DIAGNOSIS — S81.801D OPEN WOUND OF RIGHT KNEE, LEG, AND ANKLE, SUBSEQUENT ENCOUNTER: ICD-10-CM

## 2020-10-23 DIAGNOSIS — I89.0 LYMPHEDEMA OF BOTH LOWER EXTREMITIES: ICD-10-CM

## 2020-10-23 DIAGNOSIS — N17.9 ACUTE KIDNEY INJURY SUPERIMPOSED ON CKD (H): Primary | ICD-10-CM

## 2020-10-23 DIAGNOSIS — N32.81 OVERACTIVE BLADDER: ICD-10-CM

## 2020-10-23 DIAGNOSIS — I12.9 BENIGN HYPERTENSIVE KIDNEY DISEASE WITH CHRONIC KIDNEY DISEASE STAGE I THROUGH STAGE IV, OR UNSPECIFIED: ICD-10-CM

## 2020-10-23 DIAGNOSIS — E87.6 HYPOKALEMIA: ICD-10-CM

## 2020-10-23 DIAGNOSIS — C83.10 MANTLE CELL LYMPHOMA, UNSPECIFIED BODY REGION (H): ICD-10-CM

## 2020-10-23 DIAGNOSIS — S81.001D OPEN WOUND OF RIGHT KNEE, LEG, AND ANKLE, SUBSEQUENT ENCOUNTER: ICD-10-CM

## 2020-10-23 LAB
ANION GAP SERPL CALCULATED.3IONS-SCNC: 8 MMOL/L (ref 3–14)
BUN SERPL-MCNC: 17 MG/DL (ref 7–30)
CALCIUM SERPL-MCNC: 7.9 MG/DL (ref 8.5–10.1)
CHLORIDE SERPL-SCNC: 97 MMOL/L (ref 94–109)
CO2 SERPL-SCNC: 31 MMOL/L (ref 20–32)
CREAT SERPL-MCNC: 0.96 MG/DL (ref 0.52–1.04)
GFR SERPL CREATININE-BSD FRML MDRD: 58 ML/MIN/{1.73_M2}
GLUCOSE SERPL-MCNC: 158 MG/DL (ref 70–99)
HGB BLD-MCNC: 8.4 G/DL (ref 11.7–15.7)
POTASSIUM SERPL-SCNC: 3.3 MMOL/L (ref 3.4–5.3)
SODIUM SERPL-SCNC: 136 MMOL/L (ref 133–144)

## 2020-10-23 PROCEDURE — 99306 1ST NF CARE HIGH MDM 50: CPT | Mod: AI | Performed by: INTERNAL MEDICINE

## 2020-10-23 ASSESSMENT — MIFFLIN-ST. JEOR: SCORE: 2057.16

## 2020-10-23 NOTE — PROGRESS NOTES
[place] GERIATRIC SERVICES  INITIAL VISIT NOTE  October 23, 2020    PRIMARY CARE PROVIDER AND CLINIC:  [facility] [address] [place] [address] / [facility], [facility] [id number]    Chief Complaint   Patient presents with     Hospital F/U       HPI:    Waleska Marie is a 75 year old  (1945) female [facility]. History obtained from patient, nursing and extensive review of the medical record necessitated by complex hospitalization. Medical history is notable for HTN, DM II, mantle cell lymphoma, COPD and atrial fibrillation. She initially presented to the [facility] ER on 10/8/20 with increasing LE edema and was found to have acute renal failure, hyperkalemia and metabolic acidosis. She was transferred to [facility] and was hospitalized from 10/8/20 to 10/21/20. She received emergent hemodialysis on 10/9/20 with improvement in her electrolytes. EVER was felt multifactorial from perhaps decreased PO intake as well as ACI-I and diuretic. PTA lisinopril, glargine, metformin, pioglitazone and KCl were discontinued.   Furosemide was held during hospitalization and then resumed at a split BID dose.  Labs also notable for acute anemia with Hgb 5.8 and Fe sat 7% for which she received 2 units PRBCs and was started on Fe supplement and PPI. She was admitted to this facility for medical management and rehab.     Today, Ms. Marie is seen in her room after breakfast. She didn't sleep well last night due to cramps in her arms and legs that she associates with working hard with therapy. Reviewed her hospital course and she is a good historian and is aware of the multiple medication changes. No chest pain or dyspnea. She is OK with discontinuing the DuoNebs due to COVID precautions, but does want to make sure her albuterol is available. No abdominal pain, diarrhea or constipation. With regard to her LE edema, she was to be seen in clinic for Emily bocanegra.  She does not wrap them at home. She believes she has two open sores on her right leg, one on the calf and one near the heel, but both now appear to be healed. She does have a bleeding skin tear and we discussed dressing for this as well as daily ACE wraps. Main concerns for nursing today are wound care orders. She is working with therapies and is an assist of two with all transfers, assist of one with ADLs and is set up for meals.    CODE STATUS:   CPR/Full code     ALLERGIES:     Allergies   Allergen Reactions     Azithromycin Hives     Ciprofloxacin      wheezing     Morphine Hives     Nitrofurantoin      Constipation, wheezing       PAST MEDICAL HISTORY:   No past medical history on file.    PAST SURGICAL HISTORY:   Past Surgical History:   Procedure Laterality Date     IR CVC TUNNEL REMOVAL RIGHT  10/13/2020       FAMILY HISTORY:   Reviewed and non-contributory    SOCIAL HISTORY:   Lives with adult children    MEDICATIONS:  Post Discharge Medication Reconciliation Status: discharge medications reconciled and changed, per note/orders.   Current Outpatient Medications   Medication Sig Dispense Refill     acetaminophen (TYLENOL) 325 MG tablet Take 3 tablets (975 mg) by mouth 3 times daily as needed for mild pain       albuterol (PROAIR HFA/PROVENTIL HFA/VENTOLIN HFA) 108 (90 BASE) MCG/ACT Inhaler Inhale 2 puffs into the lungs every 4 hours as needed for shortness of breath / dyspnea or wheezing       apixaban ANTICOAGULANT (ELIQUIS) 5 MG tablet Take 5 mg by mouth 2 times daily       aspirin 81 MG EC tablet Take 81 mg by mouth daily       bisacodyl (DULCOLAX) 5 MG EC tablet Take 5 mg by mouth daily        cetirizine HCl (ZYRTEC ALLERGY) 10 MG CAPS Take 5 mg by mouth daily       digoxin (LANOXIN) 125 MCG tablet Take 125 mcg by mouth daily       ferrous sulfate (FEROSUL) 325 (65 Fe) MG tablet Take 1 tablet (325 mg) by mouth daily (with breakfast)       fluticasone (FLOVENT HFA) 220 MCG/ACT Inhaler Inhale 1 puff into  "the lungs 2 times daily       furosemide (LASIX) 40 MG tablet Take 1 tablet (40 mg) by mouth 2 times daily       GLUCOSAMINE-CHONDROITIN PO Take 2 capsules by mouth 2 times daily       ibrutinib (IMBRUVICA) 140 MG capsule Take 560 mg by mouth daily        insulin aspart (NOVOLOG PEN) 100 UNIT/ML pen Inject Subcutaneous At Bedtime For Bedtime: 200 - 239 give 1 units; 240 - 289 give 2 units; 290 - 339 give 3 units; = or >340 give 4 units and contact provider.       insulin aspart (NOVOLOG PEN) 100 UNIT/ML pen Inject Subcutaneous 3 times daily (with meals) TID AC: 140 - 189 = 2 unit; 190 - 239 = 3 unit; 240 - 289 = 4 unit; 290 - 339 = 5 unit; 340+ = 6 unit = or >340 give 6 units contact provider       ipratropium - albuterol 0.5 mg/2.5 mg/3 mL (DUONEB) 0.5-2.5 (3) MG/3ML neb solution Take 1 vial (3 mLs) by nebulization every 4 hours as needed for wheezing       METHYL SALICYLATE EX Externally apply topically 2 times daily        metoprolol (LOPRESSOR) 50 MG tablet Take 25 mg by mouth 2 times daily       Multiple Vitamins-Minerals (MULTIVITAMIN ADULT PO) Take 1 tablet by mouth daily        oxybutynin (DITROPAN) 5 MG tablet Take 5 mg by mouth 2 times daily       pantoprazole (PROTONIX) 40 MG EC tablet Take 1 tablet (40 mg) by mouth every morning (before breakfast)       polyethylene glycol (MIRALAX) 17 g packet Take 17 g by mouth 2 times daily as needed for constipation Hold for loose stools.       senna-docusate (SENOKOT-S/PERICOLACE) 8.6-50 MG tablet Take 1 tablet by mouth 2 times daily as needed for constipation       simvastatin (ZOCOR) 10 MG tablet Take 10 mg by mouth At Bedtime       SOY ISOFLAVONE PO Take 198 mg by mouth daily       TiZANidine HCl (ZANAFLEX) 2 MG CAPS Take 2 mg by mouth 4 times daily as needed          ROS:  10 point ROS neg other than the symptoms noted above in the HPI.    PHYSICAL EXAM:  /54   Pulse 78   Temp 97.7  F (36.5  C)   Resp 18   Ht 1.778 m (5' 10\")   Wt 147.7 kg (325 lb 9.6 " oz)   SpO2 94%   BMI 46.72 kg/m     Gen: sitting up in bed, alert, cooperative and in no acute distress  HEENT: normocephalic; oropharynx clear  Card: RRR, S1, S2, no murmurs  Resp: lungs clear to auscultation bilaterally, no crackles or wheezes  GI: abdomen soft, not-tender  MSK: normal muscle tone, bilateral LE lymphedema  Neuro: CX II-XII grossly in tact; ROM in all four extremities grossly in tact  Psych: alert and oriented x3; normal affect  Skin: LEs with venous stasis changes, dry scaly skin. RLE with mutiple eschars from healed wounds but also with a silver dollar sized superficial wound (like a skin tear) on lateral R lower leg oozing bright red blood     LABORATORY/IMAGING DATA:  Labs and imaging reviewed as per Epic.     ASSESSMENT/PLAN:    Acute on CKD III, Resolved  Metabolic Acidosis, Resolved  Hyperkalemia, Resolved  Underlying diabetic nephropathy. Baseline Cr appears to be in low 1s. Cr 0.96, CO2 31 on BMP today.   -- follow clinically - will have BMP next week for hypokalemia (see below)     Acute on Chronic Anemia  Baseline Hgb was 8-9 range in 2017. Hgb 5.8 during hospitalization and she received 2 unit PRBCs with Hgb in 7 range. Fe sat 7% and she is new on FeSO4 and PPI. Labs today with Hgb 8.4.   -- FeSO4 325 mg daily  -- pantoprazole 40 mg daily  -- will need outpatient workup for Fe deficiency anemia    Hypokalemia  K 3.3 on labs today. She has been off her KCl supplement and is no longer on ACE-I.   -- start KCl 20 mEq daily  -- BMP 10/28/20    Bilateral LE Lymphedema  Chronic Venous Stasis Changes  Exam consistent with long term LE edema. Open area on RLE will make doing lymphedema wraps difficult as needs daily dressing changes for now.   -- furosemide 40 mg BID  -- start ACE wraps to LEs, on in the morning and off at bedtime  -- elevation    RLE Wound  Multiple healed wounds, but one area about the size of a silver dollar oozing bright red blood. No signs of infection. It's superficial.    -- cleanse daily and apply Adaptec followed by Kaylin. -- change dressing daily and PRN if soiled    DM, Type II  Hgb A1c 6.7 in August. Sugars 130s-190s at TCU. PTA glargine 35 units, metformin 500 mg BID and pioglitazone 30 mg daily discontinued due to renal function and acidosis.   -- continue to follow sugars; has aspart sliding scale insulin available    HTN  SBPs 110s-120s. PTA lisinopril 10 mg daily discontinued and PTA furosemide 80 mg changed to split dosing during hospitalization.  -- furosemide 40 mg BID, metoprolol 25 mg BID   -- follow BPs and adjust medications as needed    Chronic Atrial Fibrillation  HR 70s. Discussed ASA + apixaban with her given no coronary artery disease that she is aware of.   -- digoxin 125 mcg daily and metoprolol 25 mg BID  -- ASA 81 mg and apixaban 5 mg BID     COPD  No signs of exacerbation  -- discontinue DuoNebs due to COVID 19 precautions  -- fluticasone 220 mcg BID and albuterol MDI PRN     HLD  -- simvastatin 10 mg daily     Mantle Cell Lymphoma   Follows with oncology through [facility]. Diagnosed in Nov 2009 and was treated with R-CHO followed by rituximab. Had disease progression in 2014 and was started on ibrutinib. Imaging in May of this year without progression. 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   -- ibrutinib 560 mg daily (patient supplied)  -- ongoing management per oncology - has imaging, labs and follow up next month     Overactive Bladder  -- oxybutynin 5 mg BID    Slow Transit Constipation  PTA daily Miralax increased to BID during hospitalization and she is new on Senna.   -- Miralax 17g BID PRN and Senna-S 1 tab BID PRN  -- adjust bowel regimen as needed    Morbid Obesity   BMI 46. This will slow her rehab  -- nutrition following  -- PT/OT    Physical Deconditioning  In setting of hospitalization and underlying  medical conditions  -- ongoing PT/OT    Electronically signed by:  Alessia Wiggins MD

## 2020-10-23 NOTE — PATIENT INSTRUCTIONS
1. Discontinue Duo Nebs.  2. For skin tear on right lower extremity. Cleanse daily and apply Adaptec followed by Kerlex. Change dressing daily and PRN if soiled  3. ACE wraps to bilateral lower extremities. Place on in the morning and remove at bedtime. Dx lymphedema   4. KCl 20 mEq daily. Dx hypokalemia  5. BMP 10/28/20  Dx hypokalemia    Alessia Wiggins MD

## 2020-10-23 NOTE — LETTER
10/23/2020        RE: Waleska Marie  [address]  [place] [address]        [facility]  INITIAL VISIT NOTE  October 23, 2020    PRIMARY CARE PROVIDER AND CLINIC:  [facility] [address] [place] [address] / [facility] [place] [address]    Chief Complaint   Patient presents with     Hospital F/U       HPI:    Waleska Marie is a 75 year old  (1945) female [facility] for an initial visit. History obtained from patient, nursing and extensive review of the medical record necessitated by complex hospitalization. Medical history is notable for HTN, DM II, mantle cell lymphoma, COPD and atrial fibrillation. She initially presented to the [facility] ER on 10/8/20 with increasing LE edema and was found to have acute renal failure, hyperkalemia and metabolic acidosis. She was transferred to [facility] and was hospitalized from 10/8/20 to 10/21/20. She received emergent hemodialysis on 10/9/20 with improvement in her electrolytes. EVER was felt multifactorial from perhaps decreased PO intake as well as ACI-I and diuretic. PTA lisinopril, glargine, metformin, pioglitazone and KCl were discontinued.   Furosemide was held during hospitalization and then resumed at a split BID dose.  Labs also notable for acute anemia with Hgb 5.8 and Fe sat 7% for which she received 2 units PRBCs and was started on Fe supplement and PPI. She was admitted to this facility for medical management and rehab.     Today, Ms. Marie is seen in her room after breakfast. She didn't sleep well last night due to cramps in her arms and legs that she associates with working hard with therapy. Reviewed her hospital course and she is a good historian and is aware of the multiple medication changes. No chest pain or dyspnea. She is OK with discontinuing the DuoNebs due to COVID precautions, but does want to make sure her albuterol is available. No abdominal pain, diarrhea or  constipation. With regard to her LE edema, she was to be seen in clinic for Unna boots. She does not wrap them at home. She believes she has two open sores on her right leg, one on the calf and one near the heel, but both now appear to be healed. She does have a bleeding skin tear and we discussed dressing for this as well as daily ACE wraps. Main concerns for nursing today are wound care orders. She is working with therapies and is an assist of two with all transfers, assist of one with ADLs and is set up for meals.    CODE STATUS:   CPR/Full code     ALLERGIES:     Allergies   Allergen Reactions     Azithromycin Hives     Ciprofloxacin      wheezing     Morphine Hives     Nitrofurantoin      Constipation, wheezing       PAST MEDICAL HISTORY:   No past medical history on file.    PAST SURGICAL HISTORY:   Past Surgical History:   Procedure Laterality Date     IR CVC TUNNEL REMOVAL RIGHT  10/13/2020       FAMILY HISTORY:   Reviewed and non-contributory    SOCIAL HISTORY:   Lives with adult children    MEDICATIONS:  Post Discharge Medication Reconciliation Status: discharge medications reconciled and changed, per note/orders.   Current Outpatient Medications   Medication Sig Dispense Refill     acetaminophen (TYLENOL) 325 MG tablet Take 3 tablets (975 mg) by mouth 3 times daily as needed for mild pain       albuterol (PROAIR HFA/PROVENTIL HFA/VENTOLIN HFA) 108 (90 BASE) MCG/ACT Inhaler Inhale 2 puffs into the lungs every 4 hours as needed for shortness of breath / dyspnea or wheezing       apixaban ANTICOAGULANT (ELIQUIS) 5 MG tablet Take 5 mg by mouth 2 times daily       aspirin 81 MG EC tablet Take 81 mg by mouth daily       bisacodyl (DULCOLAX) 5 MG EC tablet Take 5 mg by mouth daily        cetirizine HCl (ZYRTEC ALLERGY) 10 MG CAPS Take 5 mg by mouth daily       digoxin (LANOXIN) 125 MCG tablet Take 125 mcg by mouth daily       ferrous sulfate (FEROSUL) 325 (65 Fe) MG tablet Take 1 tablet (325 mg) by mouth daily  (with breakfast)       fluticasone (FLOVENT HFA) 220 MCG/ACT Inhaler Inhale 1 puff into the lungs 2 times daily       furosemide (LASIX) 40 MG tablet Take 1 tablet (40 mg) by mouth 2 times daily       GLUCOSAMINE-CHONDROITIN PO Take 2 capsules by mouth 2 times daily       ibrutinib (IMBRUVICA) 140 MG capsule Take 560 mg by mouth daily        insulin aspart (NOVOLOG PEN) 100 UNIT/ML pen Inject Subcutaneous At Bedtime For Bedtime: 200 - 239 give 1 units; 240 - 289 give 2 units; 290 - 339 give 3 units; = or >340 give 4 units and contact provider.       insulin aspart (NOVOLOG PEN) 100 UNIT/ML pen Inject Subcutaneous 3 times daily (with meals) TID AC: 140 - 189 = 2 unit; 190 - 239 = 3 unit; 240 - 289 = 4 unit; 290 - 339 = 5 unit; 340+ = 6 unit = or >340 give 6 units contact provider       ipratropium - albuterol 0.5 mg/2.5 mg/3 mL (DUONEB) 0.5-2.5 (3) MG/3ML neb solution Take 1 vial (3 mLs) by nebulization every 4 hours as needed for wheezing       METHYL SALICYLATE EX Externally apply topically 2 times daily        metoprolol (LOPRESSOR) 50 MG tablet Take 25 mg by mouth 2 times daily       Multiple Vitamins-Minerals (MULTIVITAMIN ADULT PO) Take 1 tablet by mouth daily        oxybutynin (DITROPAN) 5 MG tablet Take 5 mg by mouth 2 times daily       pantoprazole (PROTONIX) 40 MG EC tablet Take 1 tablet (40 mg) by mouth every morning (before breakfast)       polyethylene glycol (MIRALAX) 17 g packet Take 17 g by mouth 2 times daily as needed for constipation Hold for loose stools.       senna-docusate (SENOKOT-S/PERICOLACE) 8.6-50 MG tablet Take 1 tablet by mouth 2 times daily as needed for constipation       simvastatin (ZOCOR) 10 MG tablet Take 10 mg by mouth At Bedtime       SOY ISOFLAVONE PO Take 198 mg by mouth daily       TiZANidine HCl (ZANAFLEX) 2 MG CAPS Take 2 mg by mouth 4 times daily as needed          ROS:  10 point ROS neg other than the symptoms noted above in the HPI.    PHYSICAL EXAM:  /54   Pulse  "78   Temp 97.7  F (36.5  C)   Resp 18   Ht 1.778 m (5' 10\")   Wt 147.7 kg (325 lb 9.6 oz)   SpO2 94%   BMI 46.72 kg/m     Gen: sitting up in bed, alert, cooperative and in no acute distress  HEENT: normocephalic; oropharynx clear  Card: RRR, S1, S2, no murmurs  Resp: lungs clear to auscultation bilaterally, no crackles or wheezes  GI: abdomen soft, not-tender  MSK: normal muscle tone, bilateral LE lymphedema  Neuro: CX II-XII grossly in tact; ROM in all four extremities grossly in tact  Psych: alert and oriented x3; normal affect  Skin: LEs with venous stasis changes, dry scaly skin. RLE with mutiple eschars from healed wounds but also with a silver dollar sized superficial wound (like a skin tear) on lateral R lower leg oozing bright red blood     LABORATORY/IMAGING DATA:  Labs and imaging reviewed as per Epic.     ASSESSMENT/PLAN:    Acute on CKD III, Resolved  Metabolic Acidosis, Resolved  Hyperkalemia, Resolved  Underlying diabetic nephropathy. Baseline Cr appears to be in low 1s. Cr 0.96, CO2 31 on BMP today.   -- follow clinically - will have BMP next week for hypokalemia (see below)     Acute on Chronic Anemia  Baseline Hgb was 8-9 range in 2017. Hgb 5.8 during hospitalization and she received 2 unit PRBCs with Hgb in 7 range. Fe sat 7% and she is new on FeSO4 and PPI. Labs today with Hgb 8.4.   -- FeSO4 325 mg daily  -- pantoprazole 40 mg daily  -- will need outpatient workup for Fe deficiency anemia    Hypokalemia  K 3.3 on labs today. She has been off her KCl supplement and is no longer on ACE-I.   -- start KCl 20 mEq daily  -- BMP 10/28/20    Bilateral LE Lymphedema  Chronic Venous Stasis Changes  Exam consistent with long term LE edema. Open area on RLE will make doing lymphedema wraps difficult as needs daily dressing changes for now.   -- furosemide 40 mg BID  -- start ACE wraps to LEs, on in the morning and off at bedtime  -- elevation    RLE Wound  Multiple healed wounds, but one area about the " size of a silver dollar oozing bright red blood. No signs of infection. It's superficial.   -- cleanse daily and apply [employer] followed by Kaylin. -- change dressing daily and PRN if soiled    DM, Type II  Hgb A1c 6.7 in August. Sugars 130s-190s at TCU. PTA glargine 35 units, metformin 500 mg BID and pioglitazone 30 mg daily discontinued due to renal function and acidosis.   -- continue to follow sugars; has aspart sliding scale insulin available    HTN  SBPs 110s-120s. PTA lisinopril 10 mg daily discontinued and PTA furosemide 80 mg changed to split dosing during hospitalization.  -- furosemide 40 mg BID, metoprolol 25 mg BID   -- follow BPs and adjust medications as needed    Chronic Atrial Fibrillation  HR 70s. Discussed ASA + apixaban with her given no coronary artery disease that she is aware of.   -- digoxin 125 mcg daily and metoprolol 25 mg BID  -- ASA 81 mg and apixaban 5 mg BID     COPD  No signs of exacerbation  -- discontinue DuoNebs due to COVID 19 precautions  -- fluticasone 220 mcg BID and albuterol MDI PRN     HLD  -- simvastatin 10 mg daily     Mantle Cell Lymphoma   Follows with oncology through [facility]. Diagnosed in Nov 2009 and was treated with R-CHO followed by rituximab. Had disease progression in 2014 and was started on ibrutinib. Imaging in May of this year without progression. 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   -- ibrutinib 560 mg daily (patient supplied)  -- ongoing management per oncology - has imaging, labs and follow up next month     Overactive Bladder  -- oxybutynin 5 mg BID    Slow Transit Constipation  PTA daily Miralax increased to BID during hospitalization and she is new on Senna.   -- Miralax 17g BID PRN and Senna-S 1 tab BID PRN  -- adjust bowel regimen as needed    Morbid Obesity   BMI 46. This will slow her rehab  -- nutrition  following  -- PT/OT    Physical Deconditioning  In setting of hospitalization and underlying medical conditions  -- ongoing PT/OT    Electronically signed by:  Alessia Wiggins MD                          Sincerely,        Alessia Wiggins MD

## 2020-10-24 ENCOUNTER — TELEPHONE (OUTPATIENT)
Dept: GERIATRICS | Facility: CLINIC | Age: 75
End: 2020-10-24

## 2020-10-24 ENCOUNTER — HOSPITAL LABORATORY (OUTPATIENT)
Dept: OTHER | Facility: CLINIC | Age: 75
End: 2020-10-24

## 2020-10-24 LAB
ALBUMIN UR-MCNC: 50 MG/DL
APPEARANCE UR: ABNORMAL
BILIRUB UR QL STRIP: NEGATIVE
COLOR UR AUTO: ABNORMAL
GLUCOSE UR STRIP-MCNC: NEGATIVE MG/DL
HGB UR QL STRIP: ABNORMAL
KETONES UR STRIP-MCNC: NEGATIVE MG/DL
LEUKOCYTE ESTERASE UR QL STRIP: ABNORMAL
NITRATE UR QL: NEGATIVE
PH UR STRIP: 6.5 PH (ref 5–7)
RBC #/AREA URNS AUTO: >182 /HPF (ref 0–2)
SOURCE: ABNORMAL
SP GR UR STRIP: 1.01 (ref 1–1.03)
UROBILINOGEN UR STRIP-MCNC: NORMAL MG/DL (ref 0–2)
WBC #/AREA URNS AUTO: 23 /HPF (ref 0–5)

## 2020-10-24 NOTE — TELEPHONE ENCOUNTER
Resident having hematuria, dark carlos urine.  Asking for a UA/UC. - ok for this to occur  clarifying the fish oil to be 1000mg 2 caps daily  Glucosamine Chondroitin 500/400 1 tab daily.    Resident feels issues with her eyes.  Sclera's clear.  Crusting.    1/2 strength baby shampoo recommended daily in AM    Electronically signed by Jennifer Davidson RN, CNP

## 2020-10-24 NOTE — TELEPHONE ENCOUNTER
RN called to report lab results:  Hgb tonight 8.4  K+ 3.3    I note Dr. Wiggins ordered potassium 20 mEq daily to start tomorrow, but BMP was not back yet at that time.  Patient is on Furosemide BID.     Orders:  1- Give KCl 40 mEq stat tonight, ok to pull from E-kit if need.   2- DC KCl 20 mEq q daily starting tomorrow.   3- Instead KCl 20 mEq BID starting tomorrow 10/24.   4- Repeat BMP 26 Oct.     Hypokalemia.

## 2020-10-26 ENCOUNTER — HOSPITAL LABORATORY (OUTPATIENT)
Dept: OTHER | Facility: CLINIC | Age: 75
End: 2020-10-26

## 2020-10-26 LAB
ANION GAP SERPL CALCULATED.3IONS-SCNC: 8 MMOL/L (ref 3–14)
BACTERIA SPEC CULT: ABNORMAL
BACTERIA SPEC CULT: ABNORMAL
BUN SERPL-MCNC: 18 MG/DL (ref 7–30)
CALCIUM SERPL-MCNC: 8.2 MG/DL (ref 8.5–10.1)
CHLORIDE SERPL-SCNC: 98 MMOL/L (ref 94–109)
CO2 SERPL-SCNC: 31 MMOL/L (ref 20–32)
CREAT SERPL-MCNC: 0.99 MG/DL (ref 0.52–1.04)
GFR SERPL CREATININE-BSD FRML MDRD: 56 ML/MIN/{1.73_M2}
GLUCOSE SERPL-MCNC: 191 MG/DL (ref 70–99)
Lab: ABNORMAL
POTASSIUM SERPL-SCNC: 3.6 MMOL/L (ref 3.4–5.3)
SODIUM SERPL-SCNC: 137 MMOL/L (ref 133–144)
SPECIMEN SOURCE: ABNORMAL

## 2020-10-27 ENCOUNTER — NURSING HOME VISIT (OUTPATIENT)
Dept: GERIATRICS | Facility: CLINIC | Age: 75
End: 2020-10-27
Payer: MEDICARE

## 2020-10-27 VITALS
OXYGEN SATURATION: 94 % | HEIGHT: 70 IN | SYSTOLIC BLOOD PRESSURE: 142 MMHG | DIASTOLIC BLOOD PRESSURE: 80 MMHG | TEMPERATURE: 97.9 F | WEIGHT: 293 LBS | RESPIRATION RATE: 16 BRPM | BODY MASS INDEX: 41.95 KG/M2 | HEART RATE: 71 BPM

## 2020-10-27 DIAGNOSIS — R53.81 PHYSICAL DECONDITIONING: ICD-10-CM

## 2020-10-27 DIAGNOSIS — E11.22 TYPE 2 DIABETES MELLITUS WITH STAGE 3 CHRONIC KIDNEY DISEASE, WITH LONG-TERM CURRENT USE OF INSULIN, UNSPECIFIED WHETHER STAGE 3A OR 3B CKD (H): ICD-10-CM

## 2020-10-27 DIAGNOSIS — I89.0 LYMPHEDEMA OF BOTH LOWER EXTREMITIES: ICD-10-CM

## 2020-10-27 DIAGNOSIS — I48.20 CHRONIC ATRIAL FIBRILLATION (H): ICD-10-CM

## 2020-10-27 DIAGNOSIS — L29.9 PRURITUS: Primary | ICD-10-CM

## 2020-10-27 DIAGNOSIS — N18.30 TYPE 2 DIABETES MELLITUS WITH STAGE 3 CHRONIC KIDNEY DISEASE, WITH LONG-TERM CURRENT USE OF INSULIN, UNSPECIFIED WHETHER STAGE 3A OR 3B CKD (H): ICD-10-CM

## 2020-10-27 DIAGNOSIS — Z79.4 TYPE 2 DIABETES MELLITUS WITH STAGE 3 CHRONIC KIDNEY DISEASE, WITH LONG-TERM CURRENT USE OF INSULIN, UNSPECIFIED WHETHER STAGE 3A OR 3B CKD (H): ICD-10-CM

## 2020-10-27 DIAGNOSIS — R31.9 HEMATURIA, UNSPECIFIED TYPE: ICD-10-CM

## 2020-10-27 DIAGNOSIS — N18.30 STAGE 3 CHRONIC KIDNEY DISEASE, UNSPECIFIED WHETHER STAGE 3A OR 3B CKD (H): ICD-10-CM

## 2020-10-27 DIAGNOSIS — N32.81 OVERACTIVE BLADDER: ICD-10-CM

## 2020-10-27 DIAGNOSIS — S41.101D WOUND OF RIGHT UPPER EXTREMITY, SUBSEQUENT ENCOUNTER: ICD-10-CM

## 2020-10-27 DIAGNOSIS — E78.5 HYPERLIPIDEMIA, UNSPECIFIED HYPERLIPIDEMIA TYPE: ICD-10-CM

## 2020-10-27 PROCEDURE — 99309 SBSQ NF CARE MODERATE MDM 30: CPT | Performed by: NURSE PRACTITIONER

## 2020-10-27 RX ORDER — HYDROXYZINE HYDROCHLORIDE 25 MG/1
25 TABLET, FILM COATED ORAL EVERY 6 HOURS PRN
COMMUNITY
End: 2020-11-01

## 2020-10-27 RX ORDER — FAMOTIDINE 10 MG
10 TABLET ORAL 2 TIMES DAILY
COMMUNITY
End: 2020-11-22

## 2020-10-27 ASSESSMENT — MIFFLIN-ST. JEOR: SCORE: 1912.01

## 2020-10-27 NOTE — PROGRESS NOTES
[facility]  [place] [place] [facility] Record Number:  [id number]  Place of Service where encounter took place:  [facility]  ([facility]) [[id number]]  Chief Complaint   Patient presents with     Nursing Home Acute     HPI:    Waelska Marie  is a 75 year old (1945), who is being seen today for an episodic care visit.  HPI information obtained from: facility chart records, facility staff, patient report and [facility] chart review.     This is a 75-year-old female, with a past medical history significant for hypertension, diabetes mellitus type 2, mantle cell lymphoma, COPD and atrial fibrillation, who initially presented to [facility] ED on 10/8/20 with increasing lower extremity edema. Found to have acute renal failure, hyperkalemia and metabolic acidosis. Was transferred to [facility]  From 10/8/20 through 10/21/20 and required emergent hemodialysis on 10/9/20 with improvement in her electrolytes. EVER was thought to be secondary to poor oral intake as well as ACE-I and diuretic. PTA Lisinopril, Glargine, Metformin, Pioglitazone and Potassium Chloride were discontinued. Furosemide was also held initially. Received 2 U of PRBCs due to anemia and was started on an Iron Supplementation and PPI. A TCU stay was recommended for ongoing physical rehabilitation.     Today's concern is:    Pruritis. Complains of itching all over. Started 3 days ago. No recent soap or detergent changes. No obvious skin irritation.     Right Arm Wound. Had a right antecubital IV placed during hospitalization. Prior to discharging from hospital, IV was removed and foam dressing was placed. Today, nursing requests skin be evaluated. Per patient report, told staff at hospital she was a difficult IV stick due to having had chemotherapy. Felt the IV should have never been placed in that location. Denies pain unless moving arm.     Hematuria. Patient reports she had blood in her urine yesterday. Reports this  only happens when she has a UTI. A sample was obtained yesterday.     Type 2 Diabetes Mellitus. Upon review of blood sugars over the past 5 days, range is as follows:    Breakfast: 124-227, most < 200  Lunch: 188-225  Dinner: 102-227, most < 200  Bedtime: 190-263    Hypertension. Upon review of blood pressures over the past 5 days, systolic range from 117-142. Diastolic range from 54-81.    Past Medical and Surgical History reviewed in Epic today.    MEDICATIONS:  Current Outpatient Medications   Medication Sig Dispense Refill     acetaminophen (TYLENOL) 325 MG tablet Take 3 tablets (975 mg) by mouth 3 times daily as needed for mild pain       albuterol (PROAIR HFA/PROVENTIL HFA/VENTOLIN HFA) 108 (90 BASE) MCG/ACT Inhaler Inhale 2 puffs into the lungs every 4 hours as needed for shortness of breath / dyspnea or wheezing       apixaban ANTICOAGULANT (ELIQUIS) 5 MG tablet Take 5 mg by mouth 2 times daily       aspirin 81 MG EC tablet Take 81 mg by mouth daily       bisacodyl (DULCOLAX) 5 MG EC tablet Take 5 mg by mouth daily        cetirizine HCl (ZYRTEC ALLERGY) 10 MG CAPS Take 5 mg by mouth daily       digoxin (LANOXIN) 125 MCG tablet Take 125 mcg by mouth daily       ferrous sulfate (FEROSUL) 325 (65 Fe) MG tablet Take 1 tablet (325 mg) by mouth daily (with breakfast)       fluticasone (FLOVENT HFA) 220 MCG/ACT Inhaler Inhale 1 puff into the lungs 2 times daily       furosemide (LASIX) 40 MG tablet Take 1 tablet (40 mg) by mouth 2 times daily       GLUCOSAMINE-CHONDROITIN PO Take 2 capsules by mouth 2 times daily       ibrutinib (IMBRUVICA) 140 MG capsule Take 560 mg by mouth daily        insulin aspart (NOVOLOG PEN) 100 UNIT/ML pen Inject Subcutaneous At Bedtime For Bedtime: 200 - 239 give 1 units; 240 - 289 give 2 units; 290 - 339 give 3 units; = or >340 give 4 units and contact provider.       insulin aspart (NOVOLOG PEN) 100 UNIT/ML pen Inject Subcutaneous 3 times daily (with meals) TID AC: 140 - 189 = 2 unit;  "190 - 239 = 3 unit; 240 - 289 = 4 unit; 290 - 339 = 5 unit; 340+ = 6 unit = or >340 give 6 units contact provider       ipratropium - albuterol 0.5 mg/2.5 mg/3 mL (DUONEB) 0.5-2.5 (3) MG/3ML neb solution Take 1 vial (3 mLs) by nebulization every 4 hours as needed for wheezing       METHYL SALICYLATE EX Externally apply topically 2 times daily        metoprolol (LOPRESSOR) 50 MG tablet Take 25 mg by mouth 2 times daily       Multiple Vitamins-Minerals (MULTIVITAMIN ADULT PO) Take 1 tablet by mouth daily        oxybutynin (DITROPAN) 5 MG tablet Take 5 mg by mouth 2 times daily       pantoprazole (PROTONIX) 40 MG EC tablet Take 1 tablet (40 mg) by mouth every morning (before breakfast)       polyethylene glycol (MIRALAX) 17 g packet Take 17 g by mouth 2 times daily as needed for constipation Hold for loose stools.       senna-docusate (SENOKOT-S/PERICOLACE) 8.6-50 MG tablet Take 1 tablet by mouth 2 times daily as needed for constipation       simvastatin (ZOCOR) 10 MG tablet Take 10 mg by mouth At Bedtime       SOY ISOFLAVONE PO Take 198 mg by mouth daily       TiZANidine HCl (ZANAFLEX) 2 MG CAPS Take 2 mg by mouth 4 times daily as needed        REVIEW OF SYSTEMS:  4 point ROS including Respiratory, CV, GI and , other than that noted in the HPI,  is negative    Objective:  BP (!) 142/80   Pulse 71   Temp 97.9  F (36.6  C)   Resp 16   Ht 1.778 m (5' 10\")   Wt 133.2 kg (293 lb 9.6 oz)   SpO2 94%   BMI 42.13 kg/m    Exam Limited due to COVID-19  GENERAL APPEARANCE:  Alert, in no distress  ENT:  Mouth and posterior oropharynx normal, moist mucous membranes  EYES:  EOM, conjunctivae, lids, pupils and irises normal  RESP:  no respiratory distress  SKIN:  Small, circular wound slightly below right antecubital with no drainage surrounding by slight erythema  PSYCH:  affect and mood normal    Labs:   Labs done in SNF are in [place] EPIC. Please refer to them using EPIC/Care Everywhere.    ASSESSMENT/PLAN:  Pruritis. " Generalized. Started 3 days ago. Was started on Pantoprazole and Ferrous Sulfate during most recent hospital stay. Given side effect of Pantoprazole can be pruritis, will discontinue and change to Famotidine 10 mg PO BID. Will also add Hydroxyzine 25 mg PO q6h PRN. Also requested CMP to be drawn on 10/28/20 to check liver enzymes. Further plans pending results.     Right Arm Wound. Secondary to right antecubital IV during hospitalization. Clean with saline, pat dry, apply bacitracin, cover with non-adherent, and wrap with Kerlix. Change daily. Monitor closely for s/s of infection.    Hematuria. Single episode of hematuria per patient report. Urine culture on 10/24/20 revealed < 10,000 col/ml Enterococcus faecalis so will not treat for infection. Continue to monitor.    Type 2 Diabetes Mellitus. Last A1C 6.7 on 8/12/20. Glargine, Metformin and Pioglitazone discontinued during hospitalization. Most blood sugars < 200 - low 200s, elevated primarily at lunch and bedtime. Continue sliding scale insulin as ordered for now.     Hypertension and Hyperlipidemia. Most blood pressures <140/90. Monitor blood pressures daily. Continue Metoprolol as ordered. Also on Furosemide.    Chronic Kidney Disease Stage III. With EVER during hospitalization requiring emergent hemodialysis. Baseline Creatinine in the low 1s. Last Creatinine 0.99 on 10/26/20. Monitor periodically to ensure stability.     Chronic Anemia. Baseline Hemoglobin 8-9. Last Hemoglobin 8.4 on 10/23/20. Started on Ferrous Sulfate during hospitalization. Monitor periodically to ensure stability.     Hypokalemia. May be secondary to diuretic. Last Potassium 3.9 on 10/28/20. Continue Potassium Chloride as ordered.    Bilateral Lower Extremity Lymphedema with Chronic Venous Stasis Changes. Taking Furosemide. Continue ACE wraps as ordered.    Right Lower Extremity Wound. Skin tear right lower extremity: Cleanse daily and apply Adaptic, followed by Kerlix. Change dressing  daily and PRN.     Chronic Atrial Fibrillation. Monitor heart rate daily. Continue Metoprolol, Digoxin, Aspirin and Apixaban as ordered.    Chronic Obstructive Pulmonary Disease. Continue Fluticasone and Albuterol as ordered. DuoNebs on hold while in TCU given COVID-19 pandemic.    Mantle Cell Lymphoma. Followed by [facility]. 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 Continue Ibrutinib as ordered which patient is supplying.    Overactive Bladder. Continue Oxybutynin as ordered.    Constipation. Continue Miralax and Senna-S as ordered.    Morbid Obesity.  BMI 46. Dietary following while in TCU.    Muscle Spasms. Continue Tizanidine as ordered.    Physical Deconditioning. Secondary to recent hospitalization and co-morbidities. Physical and Occupational Therapy ordered.    Orders Communicated to TCU  Discontinue Pantoprazole  Start Famotidine 10 mg PO BID  Hydroxyzine 25 mg PO q6h PRN  CBC, CMP on 10/28/20    Electronically signed by:  GASTON Pearl CNP

## 2020-10-27 NOTE — LETTER
10/27/2020        RE: Waleska Marie  [address]  [place] [address]        [place] [facility]  [place] Medical Record Number:  [id number]  Place of Service where encounter took place:  [facility]  ([facility]) [[id number]]  Chief Complaint   Patient presents with     Nursing Home Acute     HPI:    Waleska Marie  is a 75 year old (1945), who is being seen today for an episodic care visit.  HPI information obtained from: facility chart records, facility staff, patient report and [facility] chart review.     This is a 75-year-old female, with a past medical history significant for hypertension, diabetes mellitus type 2, mantle cell lymphoma, COPD and atrial fibrillation, who initially presented to [facility] ED on 10/8/20 with increasing lower extremity edema. Found to have acute renal failure, hyperkalemia and metabolic acidosis. Was transferred to [facility]  From 10/8/20 through 10/21/20 and required emergent hemodialysis on 10/9/20 with improvement in her electrolytes. EVER was thought to be secondary to poor oral intake as well as ACE-I and diuretic. PTA Lisinopril, Glargine, Metformin, Pioglitazone and Potassium Chloride were discontinued. Furosemide was also held initially. Received 2 U of PRBCs due to anemia and was started on an Iron Supplementation and PPI. A TCU stay was recommended for ongoing physical rehabilitation.     Today's concern is:    Pruritis. Complains of itching all over. Started 3 days ago. No recent soap or detergent changes. No obvious skin irritation.     Right Arm Wound. Had a right antecubital IV placed during hospitalization. Prior to discharging from hospital, IV was removed and foam dressing was placed. Today, nursing requests skin be evaluated. Per patient report, told staff at hospital she was a difficult IV stick due to having had chemotherapy. Felt the IV should have never been placed in that location. Denies pain unless  moving arm.     Hematuria. Patient reports she had blood in her urine yesterday. Reports this only happens when she has a UTI. A sample was obtained yesterday.     Type 2 Diabetes Mellitus. Upon review of blood sugars over the past 5 days, range is as follows:    Breakfast: 124-227, most < 200  Lunch: 188-225  Dinner: 102-227, most < 200  Bedtime: 190-263    Hypertension. Upon review of blood pressures over the past 5 days, systolic range from 117-142. Diastolic range from 54-81.    Past Medical and Surgical History reviewed in Epic today.    MEDICATIONS:  Current Outpatient Medications   Medication Sig Dispense Refill     acetaminophen (TYLENOL) 325 MG tablet Take 3 tablets (975 mg) by mouth 3 times daily as needed for mild pain       albuterol (PROAIR HFA/PROVENTIL HFA/VENTOLIN HFA) 108 (90 BASE) MCG/ACT Inhaler Inhale 2 puffs into the lungs every 4 hours as needed for shortness of breath / dyspnea or wheezing       apixaban ANTICOAGULANT (ELIQUIS) 5 MG tablet Take 5 mg by mouth 2 times daily       aspirin 81 MG EC tablet Take 81 mg by mouth daily       bisacodyl (DULCOLAX) 5 MG EC tablet Take 5 mg by mouth daily        cetirizine HCl (ZYRTEC ALLERGY) 10 MG CAPS Take 5 mg by mouth daily       digoxin (LANOXIN) 125 MCG tablet Take 125 mcg by mouth daily       ferrous sulfate (FEROSUL) 325 (65 Fe) MG tablet Take 1 tablet (325 mg) by mouth daily (with breakfast)       fluticasone (FLOVENT HFA) 220 MCG/ACT Inhaler Inhale 1 puff into the lungs 2 times daily       furosemide (LASIX) 40 MG tablet Take 1 tablet (40 mg) by mouth 2 times daily       GLUCOSAMINE-CHONDROITIN PO Take 2 capsules by mouth 2 times daily       ibrutinib (IMBRUVICA) 140 MG capsule Take 560 mg by mouth daily        insulin aspart (NOVOLOG PEN) 100 UNIT/ML pen Inject Subcutaneous At Bedtime For Bedtime: 200 - 239 give 1 units; 240 - 289 give 2 units; 290 - 339 give 3 units; = or >340 give 4 units and contact provider.       insulin aspart (NOVOLOG  "PEN) 100 UNIT/ML pen Inject Subcutaneous 3 times daily (with meals) TID AC: 140 - 189 = 2 unit; 190 - 239 = 3 unit; 240 - 289 = 4 unit; 290 - 339 = 5 unit; 340+ = 6 unit = or >340 give 6 units contact provider       ipratropium - albuterol 0.5 mg/2.5 mg/3 mL (DUONEB) 0.5-2.5 (3) MG/3ML neb solution Take 1 vial (3 mLs) by nebulization every 4 hours as needed for wheezing       METHYL SALICYLATE EX Externally apply topically 2 times daily        metoprolol (LOPRESSOR) 50 MG tablet Take 25 mg by mouth 2 times daily       Multiple Vitamins-Minerals (MULTIVITAMIN ADULT PO) Take 1 tablet by mouth daily        oxybutynin (DITROPAN) 5 MG tablet Take 5 mg by mouth 2 times daily       pantoprazole (PROTONIX) 40 MG EC tablet Take 1 tablet (40 mg) by mouth every morning (before breakfast)       polyethylene glycol (MIRALAX) 17 g packet Take 17 g by mouth 2 times daily as needed for constipation Hold for loose stools.       senna-docusate (SENOKOT-S/PERICOLACE) 8.6-50 MG tablet Take 1 tablet by mouth 2 times daily as needed for constipation       simvastatin (ZOCOR) 10 MG tablet Take 10 mg by mouth At Bedtime       SOY ISOFLAVONE PO Take 198 mg by mouth daily       TiZANidine HCl (ZANAFLEX) 2 MG CAPS Take 2 mg by mouth 4 times daily as needed        REVIEW OF SYSTEMS:  4 point ROS including Respiratory, CV, GI and , other than that noted in the HPI,  is negative    Objective:  BP (!) 142/80   Pulse 71   Temp 97.9  F (36.6  C)   Resp 16   Ht 1.778 m (5' 10\")   Wt 133.2 kg (293 lb 9.6 oz)   SpO2 94%   BMI 42.13 kg/m    Exam Limited due to COVID-19  GENERAL APPEARANCE:  Alert, in no distress  ENT:  Mouth and posterior oropharynx normal, moist mucous membranes  EYES:  EOM, conjunctivae, lids, pupils and irises normal  RESP:  no respiratory distress  SKIN:  Small, circular wound slightly below right antecubital with no drainage surrounding by slight erythema  PSYCH:  affect and mood normal    Labs:   Labs done in SNF are in " [facility]. Please refer to them using [facility] Everywhere.    ASSESSMENT/PLAN:  Pruritis. Generalized. Started 3 days ago. Was started on Pantoprazole and Ferrous Sulfate during most recent hospital stay. Given side effect of Pantoprazole can be pruritis, will discontinue and change to Famotidine 10 mg PO BID. Will also add Hydroxyzine 25 mg PO q6h PRN. Also requested CMP to be drawn on 10/28/20 to check liver enzymes. Further plans pending results.     Right Arm Wound. Secondary to right antecubital IV during hospitalization. Clean with saline, pat dry, apply bacitracin, cover with non-adherent, and wrap with Kerlix. Change daily. Monitor closely for s/s of infection.    Hematuria. Single episode of hematuria per patient report. Urine culture on 10/24/20 revealed < 10,000 col/ml Enterococcus faecalis so will not treat for infection. Continue to monitor.    Type 2 Diabetes Mellitus. Last A1C 6.7 on 8/12/20. Glargine, Metformin and Pioglitazone discontinued during hospitalization. Most blood sugars < 200 - low 200s, elevated primarily at lunch and bedtime. Continue sliding scale insulin as ordered for now.     Hypertension and Hyperlipidemia. Most blood pressures <140/90. Monitor blood pressures daily. Continue Metoprolol as ordered. Also on Furosemide.    Chronic Kidney Disease Stage III. With EVER during hospitalization requiring emergent hemodialysis. Baseline Creatinine in the low 1s. Last Creatinine 0.99 on 10/26/20. Monitor periodically to ensure stability.     Chronic Anemia. Baseline Hemoglobin 8-9. Last Hemoglobin 8.4 on 10/23/20. Started on Ferrous Sulfate during hospitalization. Monitor periodically to ensure stability.     Hypokalemia. May be secondary to diuretic. Last Potassium 3.9 on 10/28/20. Continue Potassium Chloride as ordered.    Bilateral Lower Extremity Lymphedema with Chronic Venous Stasis Changes. Taking Furosemide. Continue ACE wraps as ordered.    Right Lower Extremity Wound. Skin tear  right lower extremity: Cleanse daily and apply Adaptic, followed by Kerlix. Change dressing daily and PRN.     Chronic Atrial Fibrillation. Monitor heart rate daily. Continue Metoprolol, Digoxin, Aspirin and Apixaban as ordered.    Chronic Obstructive Pulmonary Disease. Continue Fluticasone and Albuterol as ordered. DuoNebs on hold while in TCU given COVID-19 pandemic.    Mantle Cell Lymphoma. Followed by [facility]. 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 Continue Ibrutinib as ordered which patient is supplying.    Overactive Bladder. Continue Oxybutynin as ordered.    Constipation. Continue Miralax and Senna-S as ordered.    Morbid Obesity.  BMI 46. Dietary following while in TCU.    Muscle Spasms. Continue Tizanidine as ordered.    Physical Deconditioning. Secondary to recent hospitalization and co-morbidities. Physical and Occupational Therapy ordered.    Orders Communicated to TCU  Discontinue Pantoprazole  Start Famotidine 10 mg PO BID  Hydroxyzine 25 mg PO q6h PRN  CBC, CMP on 10/28/20    Electronically signed by:  GASTON Pearl CNP               Sincerely,        GASTON Pearl CNP

## 2020-10-28 ENCOUNTER — HOSPITAL LABORATORY (OUTPATIENT)
Dept: OTHER | Facility: CLINIC | Age: 75
End: 2020-10-28

## 2020-10-28 LAB
ANION GAP SERPL CALCULATED.3IONS-SCNC: 8 MMOL/L (ref 3–14)
BUN SERPL-MCNC: 19 MG/DL (ref 7–30)
CALCIUM SERPL-MCNC: 8.3 MG/DL (ref 8.5–10.1)
CHLORIDE SERPL-SCNC: 102 MMOL/L (ref 94–109)
CO2 SERPL-SCNC: 28 MMOL/L (ref 20–32)
CREAT SERPL-MCNC: 1.15 MG/DL (ref 0.52–1.04)
GFR SERPL CREATININE-BSD FRML MDRD: 47 ML/MIN/{1.73_M2}
GLUCOSE SERPL-MCNC: 178 MG/DL (ref 70–99)
POTASSIUM SERPL-SCNC: 3.9 MMOL/L (ref 3.4–5.3)
SODIUM SERPL-SCNC: 138 MMOL/L (ref 133–144)

## 2020-10-28 RX ORDER — POTASSIUM CHLORIDE 1500 MG/1
20 TABLET, EXTENDED RELEASE ORAL 2 TIMES DAILY
COMMUNITY

## 2020-10-29 ENCOUNTER — HOSPITAL LABORATORY (OUTPATIENT)
Dept: OTHER | Facility: CLINIC | Age: 75
End: 2020-10-29

## 2020-10-30 ENCOUNTER — VIRTUAL VISIT (OUTPATIENT)
Dept: GERIATRICS | Facility: CLINIC | Age: 75
End: 2020-10-30
Payer: MEDICARE

## 2020-10-30 VITALS
RESPIRATION RATE: 16 BRPM | SYSTOLIC BLOOD PRESSURE: 126 MMHG | WEIGHT: 293 LBS | TEMPERATURE: 97.3 F | BODY MASS INDEX: 41.95 KG/M2 | OXYGEN SATURATION: 96 % | HEIGHT: 70 IN | DIASTOLIC BLOOD PRESSURE: 72 MMHG | HEART RATE: 77 BPM

## 2020-10-30 DIAGNOSIS — D63.8 ANEMIA IN OTHER CHRONIC DISEASES CLASSIFIED ELSEWHERE: Primary | ICD-10-CM

## 2020-10-30 DIAGNOSIS — R58 BLEEDING: ICD-10-CM

## 2020-10-30 PROCEDURE — 99309 SBSQ NF CARE MODERATE MDM 30: CPT | Performed by: NURSE PRACTITIONER

## 2020-10-30 ASSESSMENT — MIFFLIN-ST. JEOR: SCORE: 1917

## 2020-10-30 NOTE — PROGRESS NOTES
"[place] [facility]   Waleska Marie is being evaluated via a billable video visit.   The patient has been notified of following:  \"This video visit will be conducted via a call between you and your provider. We have found that certain health care needs can be provided without the need for an in-person physical exam.  This service lets us provide the care you need with a video conversation. If during the course of the call the provider feels a video visit is not appropriate, you will not be charged for this service.\"   The provider has received verbal consent for a Video Visit from the patient or first contact? Yes  Patient  or facility staff would like the video invitation sent by: N/A   Video Start Time: did not document      [place] Medical Record Number:  [id number]  Place of Location at the time of visit: [facility]  Chief Complaint   Patient presents with     Nursing Home Acute     HPI:  Waleska Marie  is a 75 year old (1945), who is being seen today for a visit.  HPI information obtained from: facility chart records, facility staff, patient report and [facility] chart review. Today's concern is:  Facility nursing and covering NP reporting noting bleeding from ? Source, unclear if GI, vaginal and urinary. Did have documented incidence of hematuria last week, with UA ++ RBC, but UC < 10,000 organisms and no other clinical s/s of infection so not treated. Per charge nurse Amanda patient with small amount of think uri blood on pad each shift. Patient is hemodynamically stable. Reportedly CBC ordered on 10/28 but CMP obtained and CBC ordered stat yesterday again, but no results found. Amanda reports these were drawn and is in process of tracking them down.    Today patient is found in room for video visit sitting up in chair. Alert, calm. Denies pain. Reports noting some bleeding and states it is from the \"thing they had in there in the hospital\" Reports some tube was in " vagina for monitoring and states is not referring to cathter. Denies pain with urination. Denies abdominal pain. Denies noting melena, n/v. Reports adamantly the blood is not from rectum. Denies SOB, chest pain or dizziness.          Past Medical and Surgical History reviewed in Epic today.  MEDICATIONS:      Current Outpatient Medications   Medication Sig Dispense Refill     albuterol (PROAIR HFA/PROVENTIL HFA/VENTOLIN HFA) 108 (90 BASE) MCG/ACT Inhaler Inhale 2 puffs into the lungs every 4 hours as needed for shortness of breath / dyspnea or wheezing       apixaban ANTICOAGULANT (ELIQUIS) 5 MG tablet Take 5 mg by mouth 2 times daily       aspirin 81 MG EC tablet Take 81 mg by mouth daily       bisacodyl (DULCOLAX) 5 MG EC tablet Take 5 mg by mouth daily        cetirizine HCl (ZYRTEC ALLERGY) 10 MG CAPS Take 5 mg by mouth daily       digoxin (LANOXIN) 125 MCG tablet Take 125 mcg by mouth daily       famotidine (PEPCID) 10 MG tablet Take 10 mg by mouth 2 times daily       ferrous sulfate (FEROSUL) 325 (65 Fe) MG tablet Take 1 tablet (325 mg) by mouth daily (with breakfast)       fluticasone (FLOVENT HFA) 220 MCG/ACT Inhaler Inhale 2 puffs into the lungs 2 times daily        furosemide (LASIX) 40 MG tablet Take 1 tablet (40 mg) by mouth 2 times daily       GLUCOSAMINE-CHONDROITIN PO Take 2 capsules by mouth 2 times daily       hydrOXYzine (ATARAX) 25 MG tablet Take 25 mg by mouth every 6 hours as needed for itching       ibrutinib (IMBRUVICA) 140 MG capsule Take 560 mg by mouth daily        insulin aspart (NOVOLOG PEN) 100 UNIT/ML pen Inject Subcutaneous At Bedtime For Bedtime: 200 - 239 give 1 units; 240 - 289 give 2 units; 290 - 339 give 3 units; = or >340 give 4 units and contact provider.       insulin aspart (NOVOLOG PEN) 100 UNIT/ML pen Inject Subcutaneous 3 times daily (with meals) TID AC: 140 - 189 = 2 unit; 190 - 239 = 3 unit; 240 - 289 = 4 unit; 290 - 339 = 5 unit; 340+ = 6 unit = or >340 give 6 units  "contact provider       ipratropium - albuterol 0.5 mg/2.5 mg/3 mL (DUONEB) 0.5-2.5 (3) MG/3ML neb solution Take 1 vial (3 mLs) by nebulization every 4 hours as needed for wheezing (Patient not taking: Reported on 10/28/2020)       METHYL SALICYLATE EX Externally apply topically 2 times daily        metoprolol (LOPRESSOR) 50 MG tablet Take 25 mg by mouth 2 times daily       Multiple Vitamins-Minerals (MULTIVITAMIN ADULT PO) Take 1 tablet by mouth daily        oxybutynin (DITROPAN) 5 MG tablet Take 5 mg by mouth 2 times daily       polyethylene glycol (MIRALAX) 17 g packet Take 17 g by mouth 2 times daily as needed for constipation Hold for loose stools.       potassium chloride ER (K-TAB) 20 MEQ CR tablet Take 20 mEq by mouth 2 times daily       senna-docusate (SENOKOT-S/PERICOLACE) 8.6-50 MG tablet Take 1 tablet by mouth 2 times daily as needed for constipation       simvastatin (ZOCOR) 10 MG tablet Take 10 mg by mouth At Bedtime       SOY ISOFLAVONE PO Take 198 mg by mouth daily       TiZANidine HCl (ZANAFLEX) 2 MG CAPS Take 2 mg by mouth 4 times daily as needed        REVIEW OF SYSTEMS: 4 point ROS including Respiratory, CV, GI and , other than that noted in the HPI,  is negative  Objective: /72   Pulse 77   Temp 97.3  F (36.3  C)   Resp 16   Ht 1.778 m (5' 10\")   Wt 133.7 kg (294 lb 11.2 oz)   SpO2 96%   BMI 42.29 kg/m    Limited visit exam done given COVID-19 precautions.     Resp: Effort WNL  CV: VS as above  [facility]  Psych- alert, calm, pleasant  Labs:   Recent labs in [facility] reviewed by me today.     ASSESSMENT/PLAN:     Anemia in other chronic diseases classified elsewhere  Bleeding- unclear source    HGB down 8.4 10/23 --> 7.6, baseline 8-9s but has been in 7s of late.   - recent hematuria episode, UC < 10,0000 and no other symptoms so not treated  - Stool for guaiac ordered, though patient adamant not rectal/GI bleeding.  - nursing to continue to monitor for bleeding closely and updte " provider for any increase/worsening  - VS q shift and update provider with abnormal changes from baseline  - recheck CBC on Monday 11/2  - recheck patient Monday 11/2.       Orders faxed to unit.     Electronically signed by:  GASTON Baxter CNP       Video-Visit Details  Type of service:  Video Visit  Video End Time (time video stopped): Did not document  Distant Location (provider location):  [place] [facility]

## 2020-10-30 NOTE — PATIENT INSTRUCTIONS
Patient: Waleska Marie    : 1945    ORDERS:    Discontinue Aspirin    Bedside Pelvic Ultrasound- vaginal bleeding    CBC     VS q shift- notify provider if note abnormal changes from baseline.     Monitor for bleeding q shift and notify provider if note increase in amount of bleeding.    Elsy Lee, GASTON CNP on 10/30/2020 at 2:56 PM

## 2020-10-30 NOTE — LETTER
"    10/30/2020        RE: Waleska Marie  [address] [address]        [place] [facility]   Waleska Marie is being evaluated via a billable video visit.   The patient has been notified of following:  \"This video visit will be conducted via a call between you and your provider. We have found that certain health care needs can be provided without the need for an in-person physical exam.  This service lets us provide the care you need with a video conversation. If during the course of the call the provider feels a video visit is not appropriate, you will not be charged for this service.\"   The provider has received verbal consent for a Video Visit from the patient or first contact? Yes  Patient  or facility staff would like the video invitation sent by: N/A   Video Start Time: did not document      [place] Medical Record Number:  [id number]  Place of Location at the time of visit: [facility]  Chief Complaint   Patient presents with     Nursing Home Acute     HPI:  Waleska Marie  is a 75 year old (1945), who is being seen today for a visit.  HPI information obtained from: facility chart records, facility staff, patient report and [facility] chart review. Today's concern is:  Facility nursing and covering NP reporting noting bleeding from ? Source, unclear if GI, vaginal and urinary. Did have documented incidence of hematuria last week, with UA ++ RBC, but UC < 10,000 organisms and no other clinical s/s of infection so not treated. Per charge nurse Amanda patient with small amount of think uri blood on pad each shift. Patient is hemodynamically stable. Reportedly CBC ordered on 10/28 but CMP obtained and CBC ordered stat yesterday again, but no results found. Amanda reports these were drawn and is in process of tracking them down.    Today patient is found in room for video visit sitting up in chair. Alert, calm. Denies pain. Reports noting some bleeding and " "states it is from the \"thing they had in there in the hospital\" Reports some tube was in vagina for monitoring and states is not referring to cathter. Denies pain with urination. Denies abdominal pain. Denies noting melena, n/v. Reports adamantly the blood is not from rectum. Denies SOB, chest pain or dizziness.          Past Medical and Surgical History reviewed in Epic today.  MEDICATIONS:      Current Outpatient Medications   Medication Sig Dispense Refill     albuterol (PROAIR HFA/PROVENTIL HFA/VENTOLIN HFA) 108 (90 BASE) MCG/ACT Inhaler Inhale 2 puffs into the lungs every 4 hours as needed for shortness of breath / dyspnea or wheezing       apixaban ANTICOAGULANT (ELIQUIS) 5 MG tablet Take 5 mg by mouth 2 times daily       aspirin 81 MG EC tablet Take 81 mg by mouth daily       bisacodyl (DULCOLAX) 5 MG EC tablet Take 5 mg by mouth daily        cetirizine HCl (ZYRTEC ALLERGY) 10 MG CAPS Take 5 mg by mouth daily       digoxin (LANOXIN) 125 MCG tablet Take 125 mcg by mouth daily       famotidine (PEPCID) 10 MG tablet Take 10 mg by mouth 2 times daily       ferrous sulfate (FEROSUL) 325 (65 Fe) MG tablet Take 1 tablet (325 mg) by mouth daily (with breakfast)       fluticasone (FLOVENT HFA) 220 MCG/ACT Inhaler Inhale 2 puffs into the lungs 2 times daily        furosemide (LASIX) 40 MG tablet Take 1 tablet (40 mg) by mouth 2 times daily       GLUCOSAMINE-CHONDROITIN PO Take 2 capsules by mouth 2 times daily       hydrOXYzine (ATARAX) 25 MG tablet Take 25 mg by mouth every 6 hours as needed for itching       ibrutinib (IMBRUVICA) 140 MG capsule Take 560 mg by mouth daily        insulin aspart (NOVOLOG PEN) 100 UNIT/ML pen Inject Subcutaneous At Bedtime For Bedtime: 200 - 239 give 1 units; 240 - 289 give 2 units; 290 - 339 give 3 units; = or >340 give 4 units and contact provider.       insulin aspart (NOVOLOG PEN) 100 UNIT/ML pen Inject Subcutaneous 3 times daily (with meals) TID AC: 140 - 189 = 2 unit; 190 - 239 = 3 " "unit; 240 - 289 = 4 unit; 290 - 339 = 5 unit; 340+ = 6 unit = or >340 give 6 units contact provider       ipratropium - albuterol 0.5 mg/2.5 mg/3 mL (DUONEB) 0.5-2.5 (3) MG/3ML neb solution Take 1 vial (3 mLs) by nebulization every 4 hours as needed for wheezing (Patient not taking: Reported on 10/28/2020)       METHYL SALICYLATE EX Externally apply topically 2 times daily        metoprolol (LOPRESSOR) 50 MG tablet Take 25 mg by mouth 2 times daily       Multiple Vitamins-Minerals (MULTIVITAMIN ADULT PO) Take 1 tablet by mouth daily        oxybutynin (DITROPAN) 5 MG tablet Take 5 mg by mouth 2 times daily       polyethylene glycol (MIRALAX) 17 g packet Take 17 g by mouth 2 times daily as needed for constipation Hold for loose stools.       potassium chloride ER (K-TAB) 20 MEQ CR tablet Take 20 mEq by mouth 2 times daily       senna-docusate (SENOKOT-S/PERICOLACE) 8.6-50 MG tablet Take 1 tablet by mouth 2 times daily as needed for constipation       simvastatin (ZOCOR) 10 MG tablet Take 10 mg by mouth At Bedtime       SOY ISOFLAVONE PO Take 198 mg by mouth daily       TiZANidine HCl (ZANAFLEX) 2 MG CAPS Take 2 mg by mouth 4 times daily as needed        REVIEW OF SYSTEMS: 4 point ROS including Respiratory, CV, GI and , other than that noted in the HPI,  is negative  Objective: /72   Pulse 77   Temp 97.3  F (36.3  C)   Resp 16   Ht 1.778 m (5' 10\")   Wt 133.7 kg (294 lb 11.2 oz)   SpO2 96%   BMI 42.29 kg/m    Limited visit exam done given COVID-19 precautions.     Resp: Effort WNL  CV: VS as above  [facility]  Psych- alert, calm, pleasant  Labs:   Recent labs in [facility] reviewed by me today.     ASSESSMENT/PLAN:     Anemia in other chronic diseases classified elsewhere  Bleeding- unclear source    HGB down 8.4 10/23 --> 7.6, baseline 8-9s but has been in 7s of late.   - recent hematuria episode, UC < 10,0000 and no other symptoms so not treated  - Stool for guaiac ordered, though patient adamant not " rectal/GI bleeding.  - nursing to continue to monitor for bleeding closely and updte provider for any increase/worsening  - VS q shift and update provider with abnormal changes from baseline  - recheck CBC on Monday 11/2  - recheck patient Monday 11/2.       Orders faxed to unit.     Electronically signed by:  GASTON Baxter CNP       Video-Visit Details  Type of service:  Video Visit  Video End Time (time video stopped): Did not document  Distant Location (provider location):  [place] [facility]               Sincerely,        GASTON Baxter CNP

## 2020-10-31 ENCOUNTER — TELEPHONE (OUTPATIENT)
Dept: GERIATRICS | Facility: CLINIC | Age: 75
End: 2020-10-31

## 2020-10-31 NOTE — TELEPHONE ENCOUNTER
Reporting a small open area on right foot.  Dressing applied per policy protocol    Blood sugar 402 at lunch and gave the 6 units up to the highest the sliding scale states.    Asked that they call at supper if the sugar is high so that more insulin could be added.    Electronically signed by Jennifer Davidson RN, CNP

## 2020-11-01 ENCOUNTER — TELEPHONE (OUTPATIENT)
Dept: GERIATRICS | Facility: CLINIC | Age: 75
End: 2020-11-01

## 2020-11-01 DIAGNOSIS — L29.9 ITCHING: Primary | ICD-10-CM

## 2020-11-01 PROBLEM — D63.8 ANEMIA IN OTHER CHRONIC DISEASES CLASSIFIED ELSEWHERE: Status: ACTIVE | Noted: 2020-11-01

## 2020-11-01 RX ORDER — HYDROXYZINE HYDROCHLORIDE 25 MG/1
50 TABLET, FILM COATED ORAL EVERY 6 HOURS PRN
Start: 2020-11-01 | End: 2020-11-07

## 2020-11-02 ENCOUNTER — NURSING HOME VISIT (OUTPATIENT)
Dept: GERIATRICS | Facility: CLINIC | Age: 75
End: 2020-11-02
Payer: MEDICARE

## 2020-11-02 ENCOUNTER — HOSPITAL LABORATORY (OUTPATIENT)
Dept: OTHER | Facility: CLINIC | Age: 75
End: 2020-11-02

## 2020-11-02 VITALS
HEART RATE: 60 BPM | WEIGHT: 293 LBS | RESPIRATION RATE: 16 BRPM | SYSTOLIC BLOOD PRESSURE: 133 MMHG | BODY MASS INDEX: 41.95 KG/M2 | TEMPERATURE: 97.7 F | OXYGEN SATURATION: 93 % | HEIGHT: 70 IN | DIASTOLIC BLOOD PRESSURE: 80 MMHG

## 2020-11-02 DIAGNOSIS — S91.302A OPEN WOUND OF LEFT FOOT EXCLUDING ONE OR MORE TOES, INITIAL ENCOUNTER: ICD-10-CM

## 2020-11-02 DIAGNOSIS — R58 BLEEDING: Primary | ICD-10-CM

## 2020-11-02 LAB
ERYTHROCYTE [DISTWIDTH] IN BLOOD BY AUTOMATED COUNT: 15.6 % (ref 10–15)
HCT VFR BLD AUTO: 26.1 % (ref 35–47)
HGB BLD-MCNC: 7.4 G/DL (ref 11.7–15.7)
MCH RBC QN AUTO: 27.5 PG (ref 26.5–33)
MCHC RBC AUTO-ENTMCNC: 28.4 G/DL (ref 31.5–36.5)
MCV RBC AUTO: 97 FL (ref 78–100)
PLATELET # BLD AUTO: 399 10E9/L (ref 150–450)
RBC # BLD AUTO: 2.69 10E12/L (ref 3.8–5.2)
WBC # BLD AUTO: 6.2 10E9/L (ref 4–11)

## 2020-11-02 PROCEDURE — 99309 SBSQ NF CARE MODERATE MDM 30: CPT | Performed by: NURSE PRACTITIONER

## 2020-11-02 ASSESSMENT — MIFFLIN-ST. JEOR: SCORE: 1910.2

## 2020-11-02 NOTE — LETTER
11/2/2020        RE: Waleska Marie  [address]  [place] [address]        [place] [facility]  [place] Medical Record Number:  [id number]  Place of Service where encounter took place:  [facility]  (S) [[id number]]  Chief Complaint   Patient presents with     Nursing Home Acute       HPI:    Waleska Marie  is a 75 year old (1945), who is being seen today for an episodic care visit.  HPI information obtained from: facility chart records, facility staff, patient report and [facility] chart review. Today's concern is:    Some bleeding of unknown source, ? Hematuria vs GI vs vaginal source. Guaiac negative x 3, no further bleeding over the w/e. Hgb slight down on recheck. VSS.    New wound on bottom of right foot after nurse reportedly peeled flaking skin off foot.     Past Medical and Surgical History reviewed in [facility] today.    Today patient reports breathing is at baseline. Denies chest pain. Reports one brief episode of transient dizziness today - none since. States is eating and sleeping well.  Reports pain to base of right foot after nurse peeled dry skin off of wound this weekend. Some bleeding also noted.     MEDICATIONS:      Current Outpatient Medications   Medication Sig Dispense Refill     albuterol (PROAIR HFA/PROVENTIL HFA/VENTOLIN HFA) 108 (90 BASE) MCG/ACT Inhaler Inhale 2 puffs into the lungs every 4 hours as needed for shortness of breath / dyspnea or wheezing       apixaban ANTICOAGULANT (ELIQUIS) 5 MG tablet Take 5 mg by mouth 2 times daily       aspirin 81 MG EC tablet Take 81 mg by mouth daily       bisacodyl (DULCOLAX) 5 MG EC tablet Take 5 mg by mouth daily        cetirizine HCl (ZYRTEC ALLERGY) 10 MG CAPS Take 5 mg by mouth daily       digoxin (LANOXIN) 125 MCG tablet Take 125 mcg by mouth daily       famotidine (PEPCID) 10 MG tablet Take 10 mg by mouth 2 times daily       ferrous sulfate (FEROSUL) 325 (65 Fe) MG tablet Take 1 tablet (325 mg) by  mouth daily (with breakfast)       fluticasone (FLOVENT HFA) 220 MCG/ACT Inhaler Inhale 2 puffs into the lungs 2 times daily        furosemide (LASIX) 40 MG tablet Take 1 tablet (40 mg) by mouth 2 times daily       GLUCOSAMINE-CHONDROITIN PO Take 2 capsules by mouth 2 times daily       hydrOXYzine (ATARAX) 25 MG tablet Take 2 tablets (50 mg) by mouth every 6 hours as needed for itching       ibrutinib (IMBRUVICA) 140 MG capsule Take 560 mg by mouth daily        insulin aspart (NOVOLOG PEN) 100 UNIT/ML pen Inject Subcutaneous At Bedtime For Bedtime: 200 - 239 give 1 units; 240 - 289 give 2 units; 290 - 339 give 3 units; = or >340 give 4 units and contact provider.       insulin aspart (NOVOLOG PEN) 100 UNIT/ML pen Inject Subcutaneous 3 times daily (with meals) TID AC: 140 - 189 = 2 unit; 190 - 239 = 3 unit; 240 - 289 = 4 unit; 290 - 339 = 5 unit; 340+ = 6 unit = or >340 give 6 units contact provider       ipratropium - albuterol 0.5 mg/2.5 mg/3 mL (DUONEB) 0.5-2.5 (3) MG/3ML neb solution Take 1 vial (3 mLs) by nebulization every 4 hours as needed for wheezing (Patient not taking: Reported on 10/28/2020)       METHYL SALICYLATE EX Externally apply topically 2 times daily        metoprolol (LOPRESSOR) 50 MG tablet Take 25 mg by mouth 2 times daily       Multiple Vitamins-Minerals (MULTIVITAMIN ADULT PO) Take 1 tablet by mouth daily        oxybutynin (DITROPAN) 5 MG tablet Take 5 mg by mouth 2 times daily       polyethylene glycol (MIRALAX) 17 g packet Take 17 g by mouth 2 times daily as needed for constipation Hold for loose stools.       potassium chloride ER (K-TAB) 20 MEQ CR tablet Take 20 mEq by mouth 2 times daily       senna-docusate (SENOKOT-S/PERICOLACE) 8.6-50 MG tablet Take 1 tablet by mouth 2 times daily as needed for constipation       simvastatin (ZOCOR) 10 MG tablet Take 10 mg by mouth At Bedtime       SOY ISOFLAVONE PO Take 198 mg by mouth daily       TiZANidine HCl (ZANAFLEX) 2 MG CAPS Take 2 mg by  "mouth 4 times daily as needed            REVIEW OF SYSTEMS:  4 point ROS including Respiratory, CV, GI and , other than that noted in the HPI,  is negative    Objective:  /80   Pulse 60   Temp 97.7  F (36.5  C)   Resp 16   Ht 1.778 m (5' 10\")   Wt 133 kg (293 lb 3.2 oz)   SpO2 93%   BMI 42.07 kg/m    Exam:    Resp: Effort WNL, LS decreased -   CV: VS as above  Abd- soft, nontender, BS +  Musc- DIXON  Skin- Denuded area on bottom of foot - small amount of blood. Dry skin on foot.  Psych- alert, calm, pleasant      Labs:   Recent labs in [facility] reviewed by me today.     ASSESSMENT/PLAN:  Bleeding  - Fecal guaiac negative  - no further bleeding noted over the weekend  - continue montior  - repeat CBC later this week.    Open wound of left foot excluding one or more toes, initial encounter  - painful, raw denuded area bottom of right foot after dry skin removed from area.   - cleanse and apply small amount of bacitracin, adaptic, tegaderm, kerlix, then pull tubigrip over and cover with sock. Change daily until healed.         Electronically signed by:  GASTON Baxter CNP                   Sincerely,        GASTON Baxter CNP    "

## 2020-11-02 NOTE — TELEPHONE ENCOUNTER
Staff called due to Waleska asking for her Atarax around every 4 hours.  She also wants her albuterol inhaler more.    Willing to increase the Atarax to 50mg every 6 hours prn but not move the inhaler.  Maybe the atarax will calm her down and so she would forget the inhaler frequency    Electronically signed by Jennifer Davidson RN, CNP

## 2020-11-02 NOTE — PROGRESS NOTES
[place] [facility]  [place] Medical Record Number:  [id number]  Place of Service where encounter took place:  [facility]  (S) [[id number]]  Chief Complaint   Patient presents with     Nursing Home Acute       HPI:    Waleska Marie  is a 75 year old (1945), who is being seen today for an episodic care visit.  HPI information obtained from: facility chart records, facility staff, patient report and [facility] chart review. Today's concern is:    Some bleeding of unknown source, ? Hematuria vs GI vs vaginal source. Guaiac negative x 3, no further bleeding over the w/e. Hgb slight down on recheck. VSS.    New wound on bottom of right foot after nurse reportedly peeled flaking skin off foot.     Past Medical and Surgical History reviewed in Epic today.    Today patient reports breathing is at baseline. Denies chest pain. Reports one brief episode of transient dizziness today - none since. States is eating and sleeping well.  Reports pain to base of right foot after nurse peeled dry skin off of wound this weekend. Some bleeding also noted.     MEDICATIONS:      Current Outpatient Medications   Medication Sig Dispense Refill     albuterol (PROAIR HFA/PROVENTIL HFA/VENTOLIN HFA) 108 (90 BASE) MCG/ACT Inhaler Inhale 2 puffs into the lungs every 4 hours as needed for shortness of breath / dyspnea or wheezing       apixaban ANTICOAGULANT (ELIQUIS) 5 MG tablet Take 5 mg by mouth 2 times daily       aspirin 81 MG EC tablet Take 81 mg by mouth daily       bisacodyl (DULCOLAX) 5 MG EC tablet Take 5 mg by mouth daily        cetirizine HCl (ZYRTEC ALLERGY) 10 MG CAPS Take 5 mg by mouth daily       digoxin (LANOXIN) 125 MCG tablet Take 125 mcg by mouth daily       famotidine (PEPCID) 10 MG tablet Take 10 mg by mouth 2 times daily       ferrous sulfate (FEROSUL) 325 (65 Fe) MG tablet Take 1 tablet (325 mg) by mouth daily (with breakfast)       fluticasone (FLOVENT HFA) 220 MCG/ACT Inhaler Inhale 2  puffs into the lungs 2 times daily        furosemide (LASIX) 40 MG tablet Take 1 tablet (40 mg) by mouth 2 times daily       GLUCOSAMINE-CHONDROITIN PO Take 2 capsules by mouth 2 times daily       hydrOXYzine (ATARAX) 25 MG tablet Take 2 tablets (50 mg) by mouth every 6 hours as needed for itching       ibrutinib (IMBRUVICA) 140 MG capsule Take 560 mg by mouth daily        insulin aspart (NOVOLOG PEN) 100 UNIT/ML pen Inject Subcutaneous At Bedtime For Bedtime: 200 - 239 give 1 units; 240 - 289 give 2 units; 290 - 339 give 3 units; = or >340 give 4 units and contact provider.       insulin aspart (NOVOLOG PEN) 100 UNIT/ML pen Inject Subcutaneous 3 times daily (with meals) TID AC: 140 - 189 = 2 unit; 190 - 239 = 3 unit; 240 - 289 = 4 unit; 290 - 339 = 5 unit; 340+ = 6 unit = or >340 give 6 units contact provider       ipratropium - albuterol 0.5 mg/2.5 mg/3 mL (DUONEB) 0.5-2.5 (3) MG/3ML neb solution Take 1 vial (3 mLs) by nebulization every 4 hours as needed for wheezing (Patient not taking: Reported on 10/28/2020)       METHYL SALICYLATE EX Externally apply topically 2 times daily        metoprolol (LOPRESSOR) 50 MG tablet Take 25 mg by mouth 2 times daily       Multiple Vitamins-Minerals (MULTIVITAMIN ADULT PO) Take 1 tablet by mouth daily        oxybutynin (DITROPAN) 5 MG tablet Take 5 mg by mouth 2 times daily       polyethylene glycol (MIRALAX) 17 g packet Take 17 g by mouth 2 times daily as needed for constipation Hold for loose stools.       potassium chloride ER (K-TAB) 20 MEQ CR tablet Take 20 mEq by mouth 2 times daily       senna-docusate (SENOKOT-S/PERICOLACE) 8.6-50 MG tablet Take 1 tablet by mouth 2 times daily as needed for constipation       simvastatin (ZOCOR) 10 MG tablet Take 10 mg by mouth At Bedtime       SOY ISOFLAVONE PO Take 198 mg by mouth daily       TiZANidine HCl (ZANAFLEX) 2 MG CAPS Take 2 mg by mouth 4 times daily as needed            REVIEW OF SYSTEMS:  4 point ROS including  "Respiratory, CV, GI and , other than that noted in the HPI,  is negative    Objective:  /80   Pulse 60   Temp 97.7  F (36.5  C)   Resp 16   Ht 1.778 m (5' 10\")   Wt 133 kg (293 lb 3.2 oz)   SpO2 93%   BMI 42.07 kg/m    Exam:    Resp: Effort WNL, LS decreased -   CV: VS as above  Abd- soft, nontender, BS +  Musc- DIXON  Skin- Denuded area on bottom of foot - small amount of blood. Dry skin on foot.  Psych- alert, calm, pleasant      Labs:   Recent labs in [facility] reviewed by me today.     ASSESSMENT/PLAN:  Bleeding  - Fecal guaiac negative  - no further bleeding noted over the weekend  - continue montior  - repeat CBC later this week.    Open wound of left foot excluding one or more toes, initial encounter  - painful, raw denuded area bottom of right foot after dry skin removed from area.   - cleanse and apply small amount of bacitracin, adaptic, tegaderm, kerlix, then pull tubigrip over and cover with sock. Change daily until healed.         Electronically signed by:  GASTON Baxter CNP             "

## 2020-11-03 ENCOUNTER — HOSPITAL LABORATORY (OUTPATIENT)
Dept: OTHER | Facility: CLINIC | Age: 75
End: 2020-11-03

## 2020-11-03 LAB
ALBUMIN UR-MCNC: 30 MG/DL
APPEARANCE UR: ABNORMAL
BACTERIA #/AREA URNS HPF: ABNORMAL /HPF
BILIRUB UR QL STRIP: NEGATIVE
COLOR UR AUTO: YELLOW
ERYTHROCYTE [DISTWIDTH] IN BLOOD BY AUTOMATED COUNT: 15.5 % (ref 10–15)
GLUCOSE UR STRIP-MCNC: NEGATIVE MG/DL
HCT VFR BLD AUTO: 26.6 % (ref 35–47)
HGB BLD-MCNC: 7.4 G/DL (ref 11.7–15.7)
HGB UR QL STRIP: ABNORMAL
KETONES UR STRIP-MCNC: NEGATIVE MG/DL
LEUKOCYTE ESTERASE UR QL STRIP: ABNORMAL
MCH RBC QN AUTO: 26.7 PG (ref 26.5–33)
MCHC RBC AUTO-ENTMCNC: 27.8 G/DL (ref 31.5–36.5)
MCV RBC AUTO: 96 FL (ref 78–100)
MUCOUS THREADS #/AREA URNS LPF: PRESENT /LPF
NITRATE UR QL: POSITIVE
PH UR STRIP: 6 PH (ref 5–7)
PLATELET # BLD AUTO: 398 10E9/L (ref 150–450)
PROCALCITONIN SERPL-MCNC: <0.05 NG/ML
RBC # BLD AUTO: 2.77 10E12/L (ref 3.8–5.2)
RBC #/AREA URNS AUTO: 24 /HPF (ref 0–2)
SOURCE: ABNORMAL
SP GR UR STRIP: 1.01 (ref 1–1.03)
SQUAMOUS #/AREA URNS AUTO: 3 /HPF (ref 0–1)
UROBILINOGEN UR STRIP-MCNC: NORMAL MG/DL (ref 0–2)
WBC # BLD AUTO: 5.6 10E9/L (ref 4–11)
WBC #/AREA URNS AUTO: >182 /HPF (ref 0–5)
WBC CLUMPS #/AREA URNS HPF: PRESENT /HPF

## 2020-11-03 NOTE — PATIENT INSTRUCTIONS
Patient: Waleska Marie    : 1945    ORDERS:    Right foot wound- cleanse with microKlenz- pat dry, apply small amount of bacitracin, cut adaptic square dressing to fit, apply tegaderm then wrap with kerlix - cover with tubigrip and apply sock, change daily until healed. Apply vaseline to dry feet/skin on LE/feet daily. Do not peel skin.    CBC .     Continue to observe for and report s/s of bleeding to provider.    Discontinue Pepcid    Omeprazole 20 mg po daily    Vitamin C 500 mg po daily    Increase Ferrous Sulfate to 325 mg po BID and please administer with food.     GASTON Baxter CNP on 11/3/2020 at 7:56 AM

## 2020-11-04 ENCOUNTER — VIRTUAL VISIT (OUTPATIENT)
Dept: GERIATRICS | Facility: CLINIC | Age: 75
End: 2020-11-04
Payer: MEDICARE

## 2020-11-04 VITALS
SYSTOLIC BLOOD PRESSURE: 147 MMHG | DIASTOLIC BLOOD PRESSURE: 75 MMHG | RESPIRATION RATE: 18 BRPM | HEIGHT: 70 IN | TEMPERATURE: 98.8 F | OXYGEN SATURATION: 95 % | WEIGHT: 290.4 LBS | BODY MASS INDEX: 41.57 KG/M2 | HEART RATE: 78 BPM

## 2020-11-04 DIAGNOSIS — R31.9 URINARY TRACT INFECTION WITH HEMATURIA, SITE UNSPECIFIED: ICD-10-CM

## 2020-11-04 DIAGNOSIS — R10.13 DYSPEPSIA: ICD-10-CM

## 2020-11-04 DIAGNOSIS — N39.0 URINARY TRACT INFECTION WITH HEMATURIA, SITE UNSPECIFIED: ICD-10-CM

## 2020-11-04 DIAGNOSIS — R50.9 FEVER, UNSPECIFIED FEVER CAUSE: Primary | ICD-10-CM

## 2020-11-04 LAB
BACTERIA SPEC CULT: ABNORMAL
BACTERIA SPEC CULT: ABNORMAL
Lab: ABNORMAL
SARS-COV-2 RNA SPEC QL NAA+PROBE: ABNORMAL
SPECIMEN SOURCE: ABNORMAL
SPECIMEN SOURCE: ABNORMAL

## 2020-11-04 PROCEDURE — 99207 PR CDG-MDM COMPONENT: MEETS LOW - DOWN CODED: CPT | Performed by: NURSE PRACTITIONER

## 2020-11-04 PROCEDURE — 99308 SBSQ NF CARE LOW MDM 20: CPT | Mod: 95 | Performed by: NURSE PRACTITIONER

## 2020-11-04 ASSESSMENT — MIFFLIN-ST. JEOR: SCORE: 1897.5

## 2020-11-04 NOTE — PROGRESS NOTES
"[place] [facility]   Waleska Marie is being evaluated via a billable video visit.   The patient has been notified of following:  \"This video visit will be conducted via a call between you and your provider. We have found that certain health care needs can be provided without the need for an in-person physical exam.  This service lets us provide the care you need with a video conversation. If during the course of the call the provider feels a video visit is not appropriate, you will not be charged for this service.\"   The provider has received verbal consent for a Video Visit from the patient or first contact? Yes  Patient  or facility staff would like the video invitation sent by: N/A   Video Start Time: 1000      [place] Medical Record Number:  [id number]  Place of Location at the time of visit: [facility]  Chief Complaint   Patient presents with     Fever     HPI:  Waleska Marie  is a 75 year old (1945), who is being seen today for a visit.  HPI information obtained from: facility chart records, facility staff, patient report and [facility] chart review. Today's concern is:  Fever  101.1 yesterday. No associated s/s- afebrile today.     Urinary tract infection with hematuria, site unspecified  - hematuria last week, none this week. Fever as above. UA +, UC pending    Dyspepsia  Reporting mild nausea and diarrhea yesterday. States getting better. Reports she put herself on full liquid diet and intends to continue to advance.       Past Medical and Surgical History reviewed in Epic today.  MEDICATIONS:      Current Outpatient Medications   Medication Sig Dispense Refill     albuterol (PROAIR HFA/PROVENTIL HFA/VENTOLIN HFA) 108 (90 BASE) MCG/ACT Inhaler Inhale 2 puffs into the lungs every 4 hours as needed for shortness of breath / dyspnea or wheezing       apixaban ANTICOAGULANT (ELIQUIS) 5 MG tablet Take 5 mg by mouth 2 times daily       aspirin 81 MG EC tablet Take 81 " mg by mouth daily       bisacodyl (DULCOLAX) 5 MG EC tablet Take 5 mg by mouth daily        cetirizine HCl (ZYRTEC ALLERGY) 10 MG CAPS Take 5 mg by mouth daily       digoxin (LANOXIN) 125 MCG tablet Take 125 mcg by mouth daily       famotidine (PEPCID) 10 MG tablet Take 10 mg by mouth 2 times daily       ferrous sulfate (FEROSUL) 325 (65 Fe) MG tablet Take 1 tablet (325 mg) by mouth daily (with breakfast)       fluticasone (FLOVENT HFA) 220 MCG/ACT Inhaler Inhale 2 puffs into the lungs 2 times daily        furosemide (LASIX) 40 MG tablet Take 1 tablet (40 mg) by mouth 2 times daily       GLUCOSAMINE-CHONDROITIN PO Take 2 capsules by mouth 2 times daily       hydrOXYzine (ATARAX) 25 MG tablet Take 2 tablets (50 mg) by mouth every 6 hours as needed for itching       ibrutinib (IMBRUVICA) 140 MG capsule Take 560 mg by mouth daily        insulin aspart (NOVOLOG PEN) 100 UNIT/ML pen Inject Subcutaneous At Bedtime For Bedtime: 200 - 239 give 1 units; 240 - 289 give 2 units; 290 - 339 give 3 units; = or >340 give 4 units and contact provider.       insulin aspart (NOVOLOG PEN) 100 UNIT/ML pen Inject Subcutaneous 3 times daily (with meals) TID AC: 140 - 189 = 2 unit; 190 - 239 = 3 unit; 240 - 289 = 4 unit; 290 - 339 = 5 unit; 340+ = 6 unit = or >340 give 6 units contact provider       ipratropium - albuterol 0.5 mg/2.5 mg/3 mL (DUONEB) 0.5-2.5 (3) MG/3ML neb solution Take 1 vial (3 mLs) by nebulization every 4 hours as needed for wheezing (Patient not taking: Reported on 10/28/2020)       METHYL SALICYLATE EX Externally apply topically 2 times daily        metoprolol (LOPRESSOR) 50 MG tablet Take 25 mg by mouth 2 times daily       Multiple Vitamins-Minerals (MULTIVITAMIN ADULT PO) Take 1 tablet by mouth daily        oxybutynin (DITROPAN) 5 MG tablet Take 5 mg by mouth 2 times daily       polyethylene glycol (MIRALAX) 17 g packet Take 17 g by mouth 2 times daily as needed for constipation Hold for loose stools.        "potassium chloride ER (K-TAB) 20 MEQ CR tablet Take 20 mEq by mouth 2 times daily       senna-docusate (SENOKOT-S/PERICOLACE) 8.6-50 MG tablet Take 1 tablet by mouth 2 times daily as needed for constipation       simvastatin (ZOCOR) 10 MG tablet Take 10 mg by mouth At Bedtime       SOY ISOFLAVONE PO Take 198 mg by mouth daily       TiZANidine HCl (ZANAFLEX) 2 MG CAPS Take 2 mg by mouth 4 times daily as needed        REVIEW OF SYSTEMS: 4 point ROS including Respiratory, CV, GI and , other than that noted in the HPI,  is negative  Objective: BP (!) 147/75   Pulse 78   Temp 98.8  F (37.1  C)   Resp 18   Ht 1.778 m (5' 10\")   Wt 131.7 kg (290 lb 6.4 oz)   SpO2 95%   BMI 41.67 kg/m    Limited visit exam done given COVID-19 precautions.     Resp: Effort WNL  CV: VS as above  Abd- soft, nontender, BS +  Musc- DIXON  Skin- wound on bottom of right foot decreasing in size and less red (PT assisted writer to see on video)  Psych- alert, calm, pleasant    Labs:   Recent labs in [facility] reviewed by me today.     ASSESSMENT/PLAN:  Fever  Urinary tract infection with hematuria, site unspecified  - mas 101.1 yesterday, afebrile now- hematuria last week, resolved and no current urinary s/s. UA++, UC pending  - will await UC and sensitivities to treat  - follow clinically, VS q shift    Dyspepsia  - ? Etiology  - resolving  - monitor, encouraged patient to advance her diet back to regular.       Electronically signed by:  GASTON Baxter CNP       Video-Visit Details  Type of service:  Video Visit  Video End Time (time video stopped): 1008  Distant Location (provider location):  [facility]         "

## 2020-11-04 NOTE — LETTER
"    11/4/2020        RE: Waleska Marie  [address] [address]        [place]   Waleska Marie is being evaluated via a billable video visit.   The patient has been notified of following:  \"This video visit will be conducted via a call between you and your provider. We have found that certain health care needs can be provided without the need for an in-person physical exam.  This service lets us provide the care you need with a video conversation. If during the course of the call the provider feels a video visit is not appropriate, you will not be charged for this service.\"   The provider has received verbal consent for a Video Visit from the patient or first contact? Yes  Patient  or facility staff would like the video invitation sent by: N/A   Video Start Time: 1000      [place] Medical Record Number:  [id number]  Place of Location at the time of visit: [facility]  Chief Complaint   Patient presents with     Fever     HPI:  Waleska Marie  is a 75 year old (1945), who is being seen today for a visit.  HPI information obtained from: facility chart records, facility staff, patient report and [facility] chart review. Today's concern is:  Fever  101.1 yesterday. No associated s/s- afebrile today.     Urinary tract infection with hematuria, site unspecified  - hematuria last week, none this week. Fever as above. UA +, UC pending    Dyspepsia  Reporting mild nausea and diarrhea yesterday. States getting better. Reports she put herself on full liquid diet and intends to continue to advance.       Past Medical and Surgical History reviewed in Epic today.  MEDICATIONS:      Current Outpatient Medications   Medication Sig Dispense Refill     albuterol (PROAIR HFA/PROVENTIL HFA/VENTOLIN HFA) 108 (90 BASE) MCG/ACT Inhaler Inhale 2 puffs into the lungs every 4 hours as needed for shortness of breath / dyspnea or wheezing       apixaban ANTICOAGULANT " (ELIQUIS) 5 MG tablet Take 5 mg by mouth 2 times daily       aspirin 81 MG EC tablet Take 81 mg by mouth daily       bisacodyl (DULCOLAX) 5 MG EC tablet Take 5 mg by mouth daily        cetirizine HCl (ZYRTEC ALLERGY) 10 MG CAPS Take 5 mg by mouth daily       digoxin (LANOXIN) 125 MCG tablet Take 125 mcg by mouth daily       famotidine (PEPCID) 10 MG tablet Take 10 mg by mouth 2 times daily       ferrous sulfate (FEROSUL) 325 (65 Fe) MG tablet Take 1 tablet (325 mg) by mouth daily (with breakfast)       fluticasone (FLOVENT HFA) 220 MCG/ACT Inhaler Inhale 2 puffs into the lungs 2 times daily        furosemide (LASIX) 40 MG tablet Take 1 tablet (40 mg) by mouth 2 times daily       GLUCOSAMINE-CHONDROITIN PO Take 2 capsules by mouth 2 times daily       hydrOXYzine (ATARAX) 25 MG tablet Take 2 tablets (50 mg) by mouth every 6 hours as needed for itching       ibrutinib (IMBRUVICA) 140 MG capsule Take 560 mg by mouth daily        insulin aspart (NOVOLOG PEN) 100 UNIT/ML pen Inject Subcutaneous At Bedtime For Bedtime: 200 - 239 give 1 units; 240 - 289 give 2 units; 290 - 339 give 3 units; = or >340 give 4 units and contact provider.       insulin aspart (NOVOLOG PEN) 100 UNIT/ML pen Inject Subcutaneous 3 times daily (with meals) TID AC: 140 - 189 = 2 unit; 190 - 239 = 3 unit; 240 - 289 = 4 unit; 290 - 339 = 5 unit; 340+ = 6 unit = or >340 give 6 units contact provider       ipratropium - albuterol 0.5 mg/2.5 mg/3 mL (DUONEB) 0.5-2.5 (3) MG/3ML neb solution Take 1 vial (3 mLs) by nebulization every 4 hours as needed for wheezing (Patient not taking: Reported on 10/28/2020)       METHYL SALICYLATE EX Externally apply topically 2 times daily        metoprolol (LOPRESSOR) 50 MG tablet Take 25 mg by mouth 2 times daily       Multiple Vitamins-Minerals (MULTIVITAMIN ADULT PO) Take 1 tablet by mouth daily        oxybutynin (DITROPAN) 5 MG tablet Take 5 mg by mouth 2 times daily       polyethylene glycol (MIRALAX) 17 g packet  "Take 17 g by mouth 2 times daily as needed for constipation Hold for loose stools.       potassium chloride ER (K-TAB) 20 MEQ CR tablet Take 20 mEq by mouth 2 times daily       senna-docusate (SENOKOT-S/PERICOLACE) 8.6-50 MG tablet Take 1 tablet by mouth 2 times daily as needed for constipation       simvastatin (ZOCOR) 10 MG tablet Take 10 mg by mouth At Bedtime       SOY ISOFLAVONE PO Take 198 mg by mouth daily       TiZANidine HCl (ZANAFLEX) 2 MG CAPS Take 2 mg by mouth 4 times daily as needed        REVIEW OF SYSTEMS: 4 point ROS including Respiratory, CV, GI and , other than that noted in the HPI,  is negative  Objective: BP (!) 147/75   Pulse 78   Temp 98.8  F (37.1  C)   Resp 18   Ht 1.778 m (5' 10\")   Wt 131.7 kg (290 lb 6.4 oz)   SpO2 95%   BMI 41.67 kg/m    Limited visit exam done given COVID-19 precautions.     Resp: Effort WNL  CV: VS as above  Abd- soft, nontender, BS +  Musc- DIXON  Skin- wound on bottom of right foot decreasing in size and less red (PT assisted writer to see on video)  Psych- alert, calm, pleasant    Labs:   Recent labs in [facility] reviewed by me today.     ASSESSMENT/PLAN:  Fever  Urinary tract infection with hematuria, site unspecified  - mas 101.1 yesterday, afebrile now- hematuria last week, resolved and no current urinary s/s. UA++, UC pending  - will await UC and sensitivities to treat  - follow clinically, VS q shift    Dyspepsia  - ? Etiology  - resolving  - monitor, encouraged patient to advance her diet back to regular.       Electronically signed by:  GASTON Baxter CNP       Video-Visit Details  Type of service:  Video Visit  Video End Time (time video stopped): 1008  Distant Location (provider location):  [facility]               Sincerely,        GASTON Baxter CNP    "

## 2020-11-05 ENCOUNTER — HOSPITAL LABORATORY (OUTPATIENT)
Dept: OTHER | Facility: CLINIC | Age: 75
End: 2020-11-05

## 2020-11-05 ENCOUNTER — TELEPHONE (OUTPATIENT)
Dept: GERIATRICS | Facility: CLINIC | Age: 75
End: 2020-11-05

## 2020-11-05 LAB
ERYTHROCYTE [DISTWIDTH] IN BLOOD BY AUTOMATED COUNT: 15.6 % (ref 10–15)
HCT VFR BLD AUTO: 27.1 % (ref 35–47)
HGB BLD-MCNC: 7.8 G/DL (ref 11.7–15.7)
MCH RBC QN AUTO: 27.3 PG (ref 26.5–33)
MCHC RBC AUTO-ENTMCNC: 28.8 G/DL (ref 31.5–36.5)
MCV RBC AUTO: 95 FL (ref 78–100)
PLATELET # BLD AUTO: 365 10E9/L (ref 150–450)
RBC # BLD AUTO: 2.86 10E12/L (ref 3.8–5.2)
WBC # BLD AUTO: 5.1 10E9/L (ref 4–11)

## 2020-11-05 NOTE — TELEPHONE ENCOUNTER
[facility] TELEPHONE ENCOUNTER        Waleska Marie is a 75 year old  (1945),Nurse called today to report: UC results  > 100K E coli, recent hematuria and fever after indwelling cathter and [facility] inpatient. No current s/s. Afebrile    ASSESSMENT/PLAN  E coli UTI    ORDERS    Keflex 500 mg po q 8 hours x 7 days  Encourage adequate hydration.  VS q shift- report fever, abnormal changes from baseline to provider  Report any further hematuria to provider  CBC and BMP 11/9    Electronically signed by:   GASTON Baxter CNP

## 2020-11-06 ENCOUNTER — VIRTUAL VISIT (OUTPATIENT)
Dept: GERIATRICS | Facility: CLINIC | Age: 75
End: 2020-11-06
Payer: MEDICARE

## 2020-11-06 VITALS
HEIGHT: 70 IN | HEART RATE: 86 BPM | SYSTOLIC BLOOD PRESSURE: 111 MMHG | WEIGHT: 285.8 LBS | BODY MASS INDEX: 40.92 KG/M2 | DIASTOLIC BLOOD PRESSURE: 67 MMHG | TEMPERATURE: 98.1 F | OXYGEN SATURATION: 93 % | RESPIRATION RATE: 16 BRPM

## 2020-11-06 DIAGNOSIS — R31.9 URINARY TRACT INFECTION WITH HEMATURIA, SITE UNSPECIFIED: Primary | ICD-10-CM

## 2020-11-06 DIAGNOSIS — N39.0 URINARY TRACT INFECTION WITH HEMATURIA, SITE UNSPECIFIED: Primary | ICD-10-CM

## 2020-11-06 DIAGNOSIS — U07.1 COVID-19 VIRUS DETECTED: ICD-10-CM

## 2020-11-06 PROCEDURE — 99309 SBSQ NF CARE MODERATE MDM 30: CPT | Mod: 95 | Performed by: NURSE PRACTITIONER

## 2020-11-06 ASSESSMENT — MIFFLIN-ST. JEOR: SCORE: 1876.63

## 2020-11-06 NOTE — PROGRESS NOTES
"[facility]   Waleska Marie is being evaluated via a billable video visit.   The patient has been notified of following:  \"This video visit will be conducted via a call between you and your provider. We have found that certain health care needs can be provided without the need for an in-person physical exam.  This service lets us provide the care you need with a video conversation. If during the course of the call the provider feels a video visit is not appropriate, you will not be charged for this service.\"   The provider has received verbal consent for a Video Visit from the patient or first contact? Yes  Patient  or facility staff would like the video invitation sent by: N/A   Video Start Time: 0950    [facility] Medical Record Number:  [id number]  Place of Location at the time of visit: [facility]  Chief Complaint   Patient presents with     Nursing Home Acute     HPI:  Waleska Marie  is a 75 year old (1945), who is being seen today for a visit.  HPI information obtained from: facility chart records, facility staff, patient report and [facility] chart review. Today's concern is:  Urinary tract infection with hematuria, site unspecified  > 100K Ecoli- started on Keflex course yesterday  - hematuria last week, none this week. Denies urinary s/s. Isolated fever 11/3 101.1- afebrile since.     COVID-19 virus detected- 11/3 on screening  - denies new respiratory s/s. Denies new cough, new SOB, chest pain or dizziness. Temps as above. Denies n/v or diarrhea currently. VS reviewed.       Past Medical and Surgical History reviewed in Epic today.  MEDICATIONS:      Current Outpatient Medications   Medication Sig Dispense Refill     albuterol (PROAIR HFA/PROVENTIL HFA/VENTOLIN HFA) 108 (90 BASE) MCG/ACT Inhaler Inhale 2 puffs into the lungs every 4 hours as needed for shortness of breath / dyspnea or wheezing       apixaban ANTICOAGULANT (ELIQUIS) 5 MG tablet Take 5 mg by " mouth 2 times daily       aspirin 81 MG EC tablet Take 81 mg by mouth daily       bisacodyl (DULCOLAX) 5 MG EC tablet Take 5 mg by mouth daily        cetirizine HCl (ZYRTEC ALLERGY) 10 MG CAPS Take 5 mg by mouth daily       digoxin (LANOXIN) 125 MCG tablet Take 125 mcg by mouth daily       famotidine (PEPCID) 10 MG tablet Take 10 mg by mouth 2 times daily       ferrous sulfate (FEROSUL) 325 (65 Fe) MG tablet Take 1 tablet (325 mg) by mouth daily (with breakfast)       fluticasone (FLOVENT HFA) 220 MCG/ACT Inhaler Inhale 2 puffs into the lungs 2 times daily        furosemide (LASIX) 40 MG tablet Take 1 tablet (40 mg) by mouth 2 times daily       GLUCOSAMINE-CHONDROITIN PO Take 2 capsules by mouth 2 times daily       hydrOXYzine (ATARAX) 25 MG tablet Take 2 tablets (50 mg) by mouth every 6 hours as needed for itching       ibrutinib (IMBRUVICA) 140 MG capsule Take 560 mg by mouth daily        insulin aspart (NOVOLOG PEN) 100 UNIT/ML pen Inject Subcutaneous At Bedtime For Bedtime: 200 - 239 give 1 units; 240 - 289 give 2 units; 290 - 339 give 3 units; = or >340 give 4 units and contact provider.       insulin aspart (NOVOLOG PEN) 100 UNIT/ML pen Inject Subcutaneous 3 times daily (with meals) TID AC: 140 - 189 = 2 unit; 190 - 239 = 3 unit; 240 - 289 = 4 unit; 290 - 339 = 5 unit; 340+ = 6 unit = or >340 give 6 units contact provider       ipratropium - albuterol 0.5 mg/2.5 mg/3 mL (DUONEB) 0.5-2.5 (3) MG/3ML neb solution Take 1 vial (3 mLs) by nebulization every 4 hours as needed for wheezing (Patient not taking: Reported on 10/28/2020)       METHYL SALICYLATE EX Externally apply topically 2 times daily        metoprolol (LOPRESSOR) 50 MG tablet Take 25 mg by mouth 2 times daily       Multiple Vitamins-Minerals (MULTIVITAMIN ADULT PO) Take 1 tablet by mouth daily        oxybutynin (DITROPAN) 5 MG tablet Take 5 mg by mouth 2 times daily       polyethylene glycol (MIRALAX) 17 g packet Take 17 g by mouth 2 times daily as  "needed for constipation Hold for loose stools.       potassium chloride ER (K-TAB) 20 MEQ CR tablet Take 20 mEq by mouth 2 times daily       senna-docusate (SENOKOT-S/PERICOLACE) 8.6-50 MG tablet Take 1 tablet by mouth 2 times daily as needed for constipation       simvastatin (ZOCOR) 10 MG tablet Take 10 mg by mouth At Bedtime       SOY ISOFLAVONE PO Take 198 mg by mouth daily       TiZANidine HCl (ZANAFLEX) 2 MG CAPS Take 2 mg by mouth 4 times daily as needed        REVIEW OF SYSTEMS: 4 point ROS including Respiratory, CV, GI and , other than that noted in the HPI,  is negative  Objective: /67   Pulse 86   Temp 98.1  F (36.7  C)   Resp 16   Ht 1.778 m (5' 10\")   Wt 129.6 kg (285 lb 12.8 oz)   SpO2 93%   BMI 41.01 kg/m    Limited visit exam done given COVID-19 precautions.     Resp: Effort WNL  CV: VS as above  [facility]  Psych- alert, calm, pleasant    Labs:   Recent labs in [facility] reviewed by me today.     ASSESSMENT/PLAN:  Urinary tract infection with hematuria, site unspecified  - no current s/s  - continue/complete 7 day Keflex course  - VS q shift  - follow clinically    COVID-19 virus detected  - no new s/s   - closely monitor clinically  - VS q shift report abnormal values or new changes from baseline.         Electronically signed by:  GASTON Baxter CNP       Video-Visit Details  Type of service:  Video Visit  Video End Time (time video stopped): 0928  Distant Location (provider location):  [facility]         "

## 2020-11-06 NOTE — LETTER
"    11/6/2020        RE: Waleska Marie  [address] [address]        [facility]   Waleska Marie is being evaluated via a billable video visit.   The patient has been notified of following:  \"This video visit will be conducted via a call between you and your provider. We have found that certain health care needs can be provided without the need for an in-person physical exam.  This service lets us provide the care you need with a video conversation. If during the course of the call the provider feels a video visit is not appropriate, you will not be charged for this service.\"   The provider has received verbal consent for a Video Visit from the patient or first contact? Yes  Patient  or facility staff would like the video invitation sent by: N/A   Video Start Time: 0950    [place] Medical Record Number:  [id number]  Place of Location at the time of visit: [facility]  Chief Complaint   Patient presents with     Nursing Home Acute     HPI:  Waleska Marie  is a 75 year old (1945), who is being seen today for a visit.  HPI information obtained from: facility chart records, facility staff, patient report and [facility] chart review. Today's concern is:  Urinary tract infection with hematuria, site unspecified  > 100K Ecoli- started on Keflex course yesterday  - hematuria last week, none this week. Denies urinary s/s. Isolated fever 11/3 101.1- afebrile since.     COVID-19 virus detected- 11/3 on screening  - denies new respiratory s/s. Denies new cough, new SOB, chest pain or dizziness. Temps as above. Denies n/v or diarrhea currently. VS reviewed.       Past Medical and Surgical History reviewed in Epic today.  MEDICATIONS:      Current Outpatient Medications   Medication Sig Dispense Refill     albuterol (PROAIR HFA/PROVENTIL HFA/VENTOLIN HFA) 108 (90 BASE) MCG/ACT Inhaler Inhale 2 puffs into the lungs every 4 hours as needed for shortness of " breath / dyspnea or wheezing       apixaban ANTICOAGULANT (ELIQUIS) 5 MG tablet Take 5 mg by mouth 2 times daily       aspirin 81 MG EC tablet Take 81 mg by mouth daily       bisacodyl (DULCOLAX) 5 MG EC tablet Take 5 mg by mouth daily        cetirizine HCl (ZYRTEC ALLERGY) 10 MG CAPS Take 5 mg by mouth daily       digoxin (LANOXIN) 125 MCG tablet Take 125 mcg by mouth daily       famotidine (PEPCID) 10 MG tablet Take 10 mg by mouth 2 times daily       ferrous sulfate (FEROSUL) 325 (65 Fe) MG tablet Take 1 tablet (325 mg) by mouth daily (with breakfast)       fluticasone (FLOVENT HFA) 220 MCG/ACT Inhaler Inhale 2 puffs into the lungs 2 times daily        furosemide (LASIX) 40 MG tablet Take 1 tablet (40 mg) by mouth 2 times daily       GLUCOSAMINE-CHONDROITIN PO Take 2 capsules by mouth 2 times daily       hydrOXYzine (ATARAX) 25 MG tablet Take 2 tablets (50 mg) by mouth every 6 hours as needed for itching       ibrutinib (IMBRUVICA) 140 MG capsule Take 560 mg by mouth daily        insulin aspart (NOVOLOG PEN) 100 UNIT/ML pen Inject Subcutaneous At Bedtime For Bedtime: 200 - 239 give 1 units; 240 - 289 give 2 units; 290 - 339 give 3 units; = or >340 give 4 units and contact provider.       insulin aspart (NOVOLOG PEN) 100 UNIT/ML pen Inject Subcutaneous 3 times daily (with meals) TID AC: 140 - 189 = 2 unit; 190 - 239 = 3 unit; 240 - 289 = 4 unit; 290 - 339 = 5 unit; 340+ = 6 unit = or >340 give 6 units contact provider       ipratropium - albuterol 0.5 mg/2.5 mg/3 mL (DUONEB) 0.5-2.5 (3) MG/3ML neb solution Take 1 vial (3 mLs) by nebulization every 4 hours as needed for wheezing (Patient not taking: Reported on 10/28/2020)       METHYL SALICYLATE EX Externally apply topically 2 times daily        metoprolol (LOPRESSOR) 50 MG tablet Take 25 mg by mouth 2 times daily       Multiple Vitamins-Minerals (MULTIVITAMIN ADULT PO) Take 1 tablet by mouth daily        oxybutynin (DITROPAN) 5 MG tablet Take 5 mg by mouth 2 times  "daily       polyethylene glycol (MIRALAX) 17 g packet Take 17 g by mouth 2 times daily as needed for constipation Hold for loose stools.       potassium chloride ER (K-TAB) 20 MEQ CR tablet Take 20 mEq by mouth 2 times daily       senna-docusate (SENOKOT-S/PERICOLACE) 8.6-50 MG tablet Take 1 tablet by mouth 2 times daily as needed for constipation       simvastatin (ZOCOR) 10 MG tablet Take 10 mg by mouth At Bedtime       SOY ISOFLAVONE PO Take 198 mg by mouth daily       TiZANidine HCl (ZANAFLEX) 2 MG CAPS Take 2 mg by mouth 4 times daily as needed        REVIEW OF SYSTEMS: 4 point ROS including Respiratory, CV, GI and , other than that noted in the HPI,  is negative  Objective: /67   Pulse 86   Temp 98.1  F (36.7  C)   Resp 16   Ht 1.778 m (5' 10\")   Wt 129.6 kg (285 lb 12.8 oz)   SpO2 93%   BMI 41.01 kg/m    Limited visit exam done given COVID-19 precautions.     Resp: Effort WNL  CV: VS as above  [facility]  Psych- alert, calm, pleasant    Labs:   Recent labs in EPIC reviewed by me today.     ASSESSMENT/PLAN:  Urinary tract infection with hematuria, site unspecified  - no current s/s  - continue/complete 7 day Keflex course  - VS q shift  - follow clinically    COVID-19 virus detected  - no new s/s   - closely monitor clinically  - VS q shift report abnormal values or new changes from baseline.         Electronically signed by:  GASTON Baxter CNP       Video-Visit Details  Type of service:  Video Visit  Video End Time (time video stopped): 0967  Distant Location (provider location):  [facility]               Sincerely,        GASTON Baxter CNP    "

## 2020-11-07 ENCOUNTER — TELEPHONE (OUTPATIENT)
Dept: GERIATRICS | Facility: CLINIC | Age: 75
End: 2020-11-07

## 2020-11-07 DIAGNOSIS — L29.9 ITCHING: ICD-10-CM

## 2020-11-07 RX ORDER — HYDROXYZINE HYDROCHLORIDE 25 MG/1
50 TABLET, FILM COATED ORAL EVERY 4 HOURS PRN
Qty: 30 TABLET | Refills: 0
Start: 2020-11-07

## 2020-11-07 NOTE — TELEPHONE ENCOUNTER
BG this am 346.  BG is now 300. Patient is under the impression that she should not receive any insulin this weekend.  Nurse is calling me to ask me about this.  I see no information regarding holding insulin this weekend.  KIRSTEN Garcia  Cell: [phone]

## 2020-11-07 NOTE — TELEPHONE ENCOUNTER
Patient , has sliding scale insulin, order to contact PCP if >340, no A1C in Epic, also increased pruritis r/t UTI TX with cephalexin.  -add A1C onto other labs 11/9  -increase hydroxyzine from Q6h to Q4h

## 2020-11-09 ENCOUNTER — HOSPITAL LABORATORY (OUTPATIENT)
Dept: OTHER | Facility: CLINIC | Age: 75
End: 2020-11-09

## 2020-11-09 DIAGNOSIS — E11.22 TYPE 2 DIABETES MELLITUS WITH STAGE 3 CHRONIC KIDNEY DISEASE, WITH LONG-TERM CURRENT USE OF INSULIN, UNSPECIFIED WHETHER STAGE 3A OR 3B CKD (H): Primary | ICD-10-CM

## 2020-11-09 DIAGNOSIS — Z79.4 TYPE 2 DIABETES MELLITUS WITH STAGE 3 CHRONIC KIDNEY DISEASE, WITH LONG-TERM CURRENT USE OF INSULIN, UNSPECIFIED WHETHER STAGE 3A OR 3B CKD (H): Primary | ICD-10-CM

## 2020-11-09 DIAGNOSIS — N18.30 TYPE 2 DIABETES MELLITUS WITH STAGE 3 CHRONIC KIDNEY DISEASE, WITH LONG-TERM CURRENT USE OF INSULIN, UNSPECIFIED WHETHER STAGE 3A OR 3B CKD (H): Primary | ICD-10-CM

## 2020-11-09 LAB
ANION GAP SERPL CALCULATED.3IONS-SCNC: 8 MMOL/L (ref 3–14)
BUN SERPL-MCNC: 21 MG/DL (ref 7–30)
CALCIUM SERPL-MCNC: 8.1 MG/DL (ref 8.5–10.1)
CHLORIDE SERPL-SCNC: 102 MMOL/L (ref 94–109)
CO2 SERPL-SCNC: 25 MMOL/L (ref 20–32)
CREAT SERPL-MCNC: 1.1 MG/DL (ref 0.52–1.04)
ERYTHROCYTE [DISTWIDTH] IN BLOOD BY AUTOMATED COUNT: 16.5 % (ref 10–15)
GFR SERPL CREATININE-BSD FRML MDRD: 49 ML/MIN/{1.73_M2}
GLUCOSE SERPL-MCNC: 211 MG/DL (ref 70–99)
HBA1C MFR BLD: 6.5 % (ref 0–5.6)
HCT VFR BLD AUTO: 28.1 % (ref 35–47)
HGB BLD-MCNC: 7.9 G/DL (ref 11.7–15.7)
MCH RBC QN AUTO: 27.4 PG (ref 26.5–33)
MCHC RBC AUTO-ENTMCNC: 28.1 G/DL (ref 31.5–36.5)
MCV RBC AUTO: 98 FL (ref 78–100)
PLATELET # BLD AUTO: 323 10E9/L (ref 150–450)
POTASSIUM SERPL-SCNC: 4.1 MMOL/L (ref 3.4–5.3)
RBC # BLD AUTO: 2.88 10E12/L (ref 3.8–5.2)
SODIUM SERPL-SCNC: 135 MMOL/L (ref 133–144)
WBC # BLD AUTO: 7.8 10E9/L (ref 4–11)

## 2020-11-09 NOTE — PROGRESS NOTES
Patient: Waleska Marie    : 1945    ORDERS:    Lantus insulin 5 units subcutaneous at bedtime start tonight please. (hyperglycemia)    GASTON Baxter CNP on 2020 at 1:00 PM

## 2020-11-11 ENCOUNTER — DOCUMENTATION ONLY (OUTPATIENT)
Dept: OTHER | Facility: CLINIC | Age: 75
End: 2020-11-11

## 2020-11-11 ENCOUNTER — NURSING HOME VISIT (OUTPATIENT)
Dept: GERIATRICS | Facility: CLINIC | Age: 75
End: 2020-11-11
Payer: MEDICARE

## 2020-11-11 VITALS
HEART RATE: 78 BPM | BODY MASS INDEX: 40.23 KG/M2 | RESPIRATION RATE: 16 BRPM | HEIGHT: 70 IN | OXYGEN SATURATION: 90 % | TEMPERATURE: 98.2 F | WEIGHT: 281 LBS | DIASTOLIC BLOOD PRESSURE: 65 MMHG | SYSTOLIC BLOOD PRESSURE: 141 MMHG

## 2020-11-11 DIAGNOSIS — R31.9 URINARY TRACT INFECTION WITH HEMATURIA, SITE UNSPECIFIED: Primary | ICD-10-CM

## 2020-11-11 DIAGNOSIS — N39.0 URINARY TRACT INFECTION WITH HEMATURIA, SITE UNSPECIFIED: Primary | ICD-10-CM

## 2020-11-11 DIAGNOSIS — U07.1 COVID-19 VIRUS DETECTED: ICD-10-CM

## 2020-11-11 LAB
ALBUMIN SERPL-MCNC: 2.5 G/DL (ref 3.4–5)
ALP SERPL-CCNC: 103 U/L (ref 40–150)
ALT SERPL W P-5'-P-CCNC: 16 U/L (ref 0–50)
ANION GAP SERPL CALCULATED.3IONS-SCNC: 9 MMOL/L (ref 3–14)
AST SERPL W P-5'-P-CCNC: 19 U/L (ref 0–45)
BILIRUB SERPL-MCNC: 0.5 MG/DL (ref 0.2–1.3)
BUN SERPL-MCNC: 22 MG/DL (ref 7–30)
CALCIUM SERPL-MCNC: 7.8 MG/DL (ref 8.5–10.1)
CHLORIDE SERPL-SCNC: 102 MMOL/L (ref 94–109)
CO2 SERPL-SCNC: 27 MMOL/L (ref 20–32)
CREAT SERPL-MCNC: 0.98 MG/DL (ref 0.52–1.04)
ERYTHROCYTE [DISTWIDTH] IN BLOOD BY AUTOMATED COUNT: 16.5 % (ref 10–15)
GFR SERPL CREATININE-BSD FRML MDRD: 56 ML/MIN/{1.73_M2}
GLUCOSE SERPL-MCNC: 215 MG/DL (ref 70–99)
HCT VFR BLD AUTO: 27.8 % (ref 35–47)
HGB BLD-MCNC: 7.6 G/DL (ref 11.7–15.7)
MCH RBC QN AUTO: 26.9 PG (ref 26.5–33)
MCHC RBC AUTO-ENTMCNC: 27.3 G/DL (ref 31.5–36.5)
MCV RBC AUTO: 98 FL (ref 78–100)
PLATELET # BLD AUTO: 406 10E9/L (ref 150–450)
POTASSIUM SERPL-SCNC: 3.7 MMOL/L (ref 3.4–5.3)
PROT SERPL-MCNC: 6.4 G/DL (ref 6.8–8.8)
RBC # BLD AUTO: 2.83 10E12/L (ref 3.8–5.2)
SODIUM SERPL-SCNC: 138 MMOL/L (ref 133–144)
WBC # BLD AUTO: 9.6 10E9/L (ref 4–11)

## 2020-11-11 PROCEDURE — 99309 SBSQ NF CARE MODERATE MDM 30: CPT | Performed by: NURSE PRACTITIONER

## 2020-11-11 ASSESSMENT — MIFFLIN-ST. JEOR: SCORE: 1854.86

## 2020-11-11 NOTE — LETTER
11/11/2020        RE: Waleska Marie  [address]  [place] [address]        [place]  [place] Medical Record Number:  [id number]  Place of Service where encounter took place:  [facility]  (S) [[id number]]  Chief Complaint   Patient presents with     RECHECK       HPI:    Waleska Marie  is a 75 year old (1945), who is being seen today for an episodic care visit.  HPI information obtained from: facility chart records and facility staff. Today's concern is:     Urinary tract infection with hematuria, site unspecified  COVID-19 virus detected     Reports feeling okay. States had some trouble sleeping last night 2/2 arms and shoulders aching and when woke noted her left middle finger was purple. States mostly gone today. Reports good sensation to hands and feet. Denies n/t and reports ached and pains to arms have lessened. Denies new cough or worsening SOB from baseline. Denies n/v or diarrhea. Denies dysuria. Denies fever or chills. VS reviewed.     Past Medical and Surgical History reviewed in Epic today.    MEDICATIONS:    Current Outpatient Medications   Medication Sig Dispense Refill     albuterol (PROAIR HFA/PROVENTIL HFA/VENTOLIN HFA) 108 (90 BASE) MCG/ACT Inhaler Inhale 2 puffs into the lungs every 4 hours as needed for shortness of breath / dyspnea or wheezing       apixaban ANTICOAGULANT (ELIQUIS) 5 MG tablet Take 5 mg by mouth 2 times daily       aspirin 81 MG EC tablet Take 81 mg by mouth daily       bisacodyl (DULCOLAX) 5 MG EC tablet Take 5 mg by mouth daily        cetirizine HCl (ZYRTEC ALLERGY) 10 MG CAPS Take 5 mg by mouth daily       digoxin (LANOXIN) 125 MCG tablet Take 125 mcg by mouth daily       famotidine (PEPCID) 10 MG tablet Take 10 mg by mouth 2 times daily       ferrous sulfate (FEROSUL) 325 (65 Fe) MG tablet Take 1 tablet (325 mg) by mouth daily (with breakfast)       fluticasone (FLOVENT HFA) 220 MCG/ACT Inhaler Inhale 2 puffs into the  lungs 2 times daily        furosemide (LASIX) 40 MG tablet Take 1 tablet (40 mg) by mouth 2 times daily       GLUCOSAMINE-CHONDROITIN PO Take 2 capsules by mouth 2 times daily       hydrOXYzine (ATARAX) 25 MG tablet Take 2 tablets (50 mg) by mouth every 4 hours as needed for itching 30 tablet 0     ibrutinib (IMBRUVICA) 140 MG capsule Take 560 mg by mouth daily        insulin aspart (NOVOLOG PEN) 100 UNIT/ML pen Inject Subcutaneous At Bedtime For Bedtime: 200 - 239 give 1 units; 240 - 289 give 2 units; 290 - 339 give 3 units; = or >340 give 4 units and contact provider.       insulin aspart (NOVOLOG PEN) 100 UNIT/ML pen Inject Subcutaneous 3 times daily (with meals) TID AC: 140 - 189 = 2 unit; 190 - 239 = 3 unit; 240 - 289 = 4 unit; 290 - 339 = 5 unit; 340+ = 6 unit = or >340 give 6 units contact provider       insulin glargine (LANTUS PEN) 100 UNIT/ML pen Inject 5 Units Subcutaneous At Bedtime       ipratropium - albuterol 0.5 mg/2.5 mg/3 mL (DUONEB) 0.5-2.5 (3) MG/3ML neb solution Take 1 vial (3 mLs) by nebulization every 4 hours as needed for wheezing (Patient not taking: Reported on 10/28/2020)       METHYL SALICYLATE EX Externally apply topically 2 times daily        metoprolol (LOPRESSOR) 50 MG tablet Take 25 mg by mouth 2 times daily       Multiple Vitamins-Minerals (MULTIVITAMIN ADULT PO) Take 1 tablet by mouth daily        oxybutynin (DITROPAN) 5 MG tablet Take 5 mg by mouth 2 times daily       polyethylene glycol (MIRALAX) 17 g packet Take 17 g by mouth 2 times daily as needed for constipation Hold for loose stools.       potassium chloride ER (K-TAB) 20 MEQ CR tablet Take 20 mEq by mouth 2 times daily       senna-docusate (SENOKOT-S/PERICOLACE) 8.6-50 MG tablet Take 1 tablet by mouth 2 times daily as needed for constipation       simvastatin (ZOCOR) 10 MG tablet Take 10 mg by mouth At Bedtime       SOY ISOFLAVONE PO Take 198 mg by mouth daily       TiZANidine HCl (ZANAFLEX) 2 MG CAPS Take 2 mg by mouth 4  "times daily as needed            REVIEW OF SYSTEMS:  4 point ROS including Respiratory, CV, GI and , other than that noted in the HPI,  is negative    Objective:  BP (!) 141/65   Pulse 78   Temp 98.2  F (36.8  C)   Resp 16   Ht 1.778 m (5' 10\")   Wt 127.5 kg (281 lb)   SpO2 90%   BMI 40.32 kg/m    Exam:    Resp: Effort WNL  CV: VSS- + CMS all extremities, 1++ LE edema (baseline) slight purple area over middle finger knuckle area  Abd- soft, nontender, BS +  [facility]  Psych- alert, calm, pleasant      Labs:   Recent labs in [facility] reviewed by me today.     ASSESSMENT/PLAN:  Urinary tract infection with hematuria, site unspecified  - no current s/s- afebrile  - Keflex changed to Bactrim as patient was not tolerating- completes Bactrim today  - VS q shift  - follow clinically     COVID-19 virus detected  - reported some UE aches and pains and \"purple middle finger\" now just small purple area over knuckle and good CMS- aches and pains diminished. Is on Eliquis. Will continue to monitor. ? If r/t COVID or not. No other noted s/s noted.   - closely monitor clinically  - VS q shift report abnormal values or new changes from baseline.             Electronically signed by:GASTON Baxter CNP                   Sincerely,        GASTON Baxter CNP    "

## 2020-11-11 NOTE — PROGRESS NOTES
[facility]  [facility] Medical Record Number:  [id number]  Place of Service where encounter took place:  [facility]  (S) [[id number]]  Chief Complaint   Patient presents with     RECHECK       HPI:    Waleska Marie  is a 75 year old (1945), who is being seen today for an episodic care visit.  HPI information obtained from: facility chart records and facility staff. Today's concern is:     Urinary tract infection with hematuria, site unspecified  COVID-19 virus detected     Reports feeling okay. States had some trouble sleeping last night 2/2 arms and shoulders aching and when woke noted her left middle finger was purple. States mostly gone today. Reports good sensation to hands and feet. Denies n/t and reports ached and pains to arms have lessened. Denies new cough or worsening SOB from baseline. Denies n/v or diarrhea. Denies dysuria. Denies fever or chills. VS reviewed.     Past Medical and Surgical History reviewed in Epic today.    MEDICATIONS:    Current Outpatient Medications   Medication Sig Dispense Refill     albuterol (PROAIR HFA/PROVENTIL HFA/VENTOLIN HFA) 108 (90 BASE) MCG/ACT Inhaler Inhale 2 puffs into the lungs every 4 hours as needed for shortness of breath / dyspnea or wheezing       apixaban ANTICOAGULANT (ELIQUIS) 5 MG tablet Take 5 mg by mouth 2 times daily       aspirin 81 MG EC tablet Take 81 mg by mouth daily       bisacodyl (DULCOLAX) 5 MG EC tablet Take 5 mg by mouth daily        cetirizine HCl (ZYRTEC ALLERGY) 10 MG CAPS Take 5 mg by mouth daily       digoxin (LANOXIN) 125 MCG tablet Take 125 mcg by mouth daily       famotidine (PEPCID) 10 MG tablet Take 10 mg by mouth 2 times daily       ferrous sulfate (FEROSUL) 325 (65 Fe) MG tablet Take 1 tablet (325 mg) by mouth daily (with breakfast)       fluticasone (FLOVENT HFA) 220 MCG/ACT Inhaler Inhale 2 puffs into the lungs 2 times daily        furosemide (LASIX) 40 MG tablet Take 1 tablet (40 mg) by mouth  2 times daily       GLUCOSAMINE-CHONDROITIN PO Take 2 capsules by mouth 2 times daily       hydrOXYzine (ATARAX) 25 MG tablet Take 2 tablets (50 mg) by mouth every 4 hours as needed for itching 30 tablet 0     ibrutinib (IMBRUVICA) 140 MG capsule Take 560 mg by mouth daily        insulin aspart (NOVOLOG PEN) 100 UNIT/ML pen Inject Subcutaneous At Bedtime For Bedtime: 200 - 239 give 1 units; 240 - 289 give 2 units; 290 - 339 give 3 units; = or >340 give 4 units and contact provider.       insulin aspart (NOVOLOG PEN) 100 UNIT/ML pen Inject Subcutaneous 3 times daily (with meals) TID AC: 140 - 189 = 2 unit; 190 - 239 = 3 unit; 240 - 289 = 4 unit; 290 - 339 = 5 unit; 340+ = 6 unit = or >340 give 6 units contact provider       insulin glargine (LANTUS PEN) 100 UNIT/ML pen Inject 5 Units Subcutaneous At Bedtime       ipratropium - albuterol 0.5 mg/2.5 mg/3 mL (DUONEB) 0.5-2.5 (3) MG/3ML neb solution Take 1 vial (3 mLs) by nebulization every 4 hours as needed for wheezing (Patient not taking: Reported on 10/28/2020)       METHYL SALICYLATE EX Externally apply topically 2 times daily        metoprolol (LOPRESSOR) 50 MG tablet Take 25 mg by mouth 2 times daily       Multiple Vitamins-Minerals (MULTIVITAMIN ADULT PO) Take 1 tablet by mouth daily        oxybutynin (DITROPAN) 5 MG tablet Take 5 mg by mouth 2 times daily       polyethylene glycol (MIRALAX) 17 g packet Take 17 g by mouth 2 times daily as needed for constipation Hold for loose stools.       potassium chloride ER (K-TAB) 20 MEQ CR tablet Take 20 mEq by mouth 2 times daily       senna-docusate (SENOKOT-S/PERICOLACE) 8.6-50 MG tablet Take 1 tablet by mouth 2 times daily as needed for constipation       simvastatin (ZOCOR) 10 MG tablet Take 10 mg by mouth At Bedtime       SOY ISOFLAVONE PO Take 198 mg by mouth daily       TiZANidine HCl (ZANAFLEX) 2 MG CAPS Take 2 mg by mouth 4 times daily as needed            REVIEW OF SYSTEMS:  4 point ROS including Respiratory,  "CV, GI and , other than that noted in the HPI,  is negative    Objective:  BP (!) 141/65   Pulse 78   Temp 98.2  F (36.8  C)   Resp 16   Ht 1.778 m (5' 10\")   Wt 127.5 kg (281 lb)   SpO2 90%   BMI 40.32 kg/m    Exam:    Resp: Effort WNL  CV: VSS- + CMS all extremities, 1++ LE edema (baseline) slight purple area over middle finger knuckle area  Abd- soft, nontender, BS +  Musc- [facility]  Psych- alert, calm, pleasant      Labs:   Recent labs in EPIC reviewed by me today.     ASSESSMENT/PLAN:  Urinary tract infection with hematuria, site unspecified  - no current s/s- afebrile  - Keflex changed to Bactrim as patient was not tolerating- completes Bactrim today  - VS q shift  - follow clinically     COVID-19 virus detected  - reported some UE aches and pains and \"purple middle finger\" now just small purple area over knuckle and good CMS- aches and pains diminished. Is on Eliquis. Will continue to monitor. ? If r/t COVID or not. No other noted s/s noted.   - closely monitor clinically  - VS q shift report abnormal values or new changes from baseline.             Electronically signed by:GASTON Baxter CNP             "

## 2020-11-17 ENCOUNTER — VIRTUAL VISIT (OUTPATIENT)
Dept: GERIATRICS | Facility: CLINIC | Age: 75
End: 2020-11-17
Payer: MEDICARE

## 2020-11-17 ENCOUNTER — TELEPHONE (OUTPATIENT)
Dept: GERIATRICS | Facility: CLINIC | Age: 75
End: 2020-11-17

## 2020-11-17 VITALS
WEIGHT: 276 LBS | DIASTOLIC BLOOD PRESSURE: 77 MMHG | TEMPERATURE: 98.7 F | SYSTOLIC BLOOD PRESSURE: 145 MMHG | HEART RATE: 54 BPM | BODY MASS INDEX: 39.51 KG/M2 | HEIGHT: 70 IN | OXYGEN SATURATION: 92 % | RESPIRATION RATE: 18 BRPM

## 2020-11-17 DIAGNOSIS — I12.9 BENIGN HYPERTENSIVE KIDNEY DISEASE WITH CHRONIC KIDNEY DISEASE STAGE I THROUGH STAGE IV, OR UNSPECIFIED: ICD-10-CM

## 2020-11-17 DIAGNOSIS — Z79.4 TYPE 2 DIABETES MELLITUS WITH STAGE 3 CHRONIC KIDNEY DISEASE, WITH LONG-TERM CURRENT USE OF INSULIN, UNSPECIFIED WHETHER STAGE 3A OR 3B CKD (H): Primary | ICD-10-CM

## 2020-11-17 DIAGNOSIS — R53.81 PHYSICAL DECONDITIONING: ICD-10-CM

## 2020-11-17 DIAGNOSIS — E78.5 HYPERLIPIDEMIA, UNSPECIFIED HYPERLIPIDEMIA TYPE: ICD-10-CM

## 2020-11-17 DIAGNOSIS — J44.9 CHRONIC OBSTRUCTIVE PULMONARY DISEASE, UNSPECIFIED COPD TYPE (H): ICD-10-CM

## 2020-11-17 DIAGNOSIS — N39.0 URINARY TRACT INFECTION WITH HEMATURIA, SITE UNSPECIFIED: ICD-10-CM

## 2020-11-17 DIAGNOSIS — I48.20 CHRONIC ATRIAL FIBRILLATION (H): ICD-10-CM

## 2020-11-17 DIAGNOSIS — I89.0 LYMPHEDEMA OF BOTH LOWER EXTREMITIES: ICD-10-CM

## 2020-11-17 DIAGNOSIS — D50.0 IRON DEFICIENCY ANEMIA DUE TO CHRONIC BLOOD LOSS: ICD-10-CM

## 2020-11-17 DIAGNOSIS — R31.9 URINARY TRACT INFECTION WITH HEMATURIA, SITE UNSPECIFIED: ICD-10-CM

## 2020-11-17 DIAGNOSIS — N18.30 STAGE 3 CHRONIC KIDNEY DISEASE, UNSPECIFIED WHETHER STAGE 3A OR 3B CKD (H): ICD-10-CM

## 2020-11-17 DIAGNOSIS — N18.30 TYPE 2 DIABETES MELLITUS WITH STAGE 3 CHRONIC KIDNEY DISEASE, WITH LONG-TERM CURRENT USE OF INSULIN, UNSPECIFIED WHETHER STAGE 3A OR 3B CKD (H): Primary | ICD-10-CM

## 2020-11-17 DIAGNOSIS — C83.10 MANTLE CELL LYMPHOMA, UNSPECIFIED BODY REGION (H): ICD-10-CM

## 2020-11-17 DIAGNOSIS — E11.22 TYPE 2 DIABETES MELLITUS WITH STAGE 3 CHRONIC KIDNEY DISEASE, WITH LONG-TERM CURRENT USE OF INSULIN, UNSPECIFIED WHETHER STAGE 3A OR 3B CKD (H): Primary | ICD-10-CM

## 2020-11-17 DIAGNOSIS — U07.1 COVID-19 VIRUS DETECTED: ICD-10-CM

## 2020-11-17 PROCEDURE — 99309 SBSQ NF CARE MODERATE MDM 30: CPT | Mod: 95 | Performed by: NURSE PRACTITIONER

## 2020-11-17 RX ORDER — ASCORBIC ACID 500 MG
500 TABLET ORAL DAILY
Start: 2020-11-17

## 2020-11-17 RX ORDER — FERROUS SULFATE 325(65) MG
325 TABLET ORAL 2 TIMES DAILY
Start: 2020-11-17

## 2020-11-17 RX ORDER — ACETAMINOPHEN 325 MG/1
975 TABLET ORAL 3 TIMES DAILY
Start: 2020-11-17

## 2020-11-17 ASSESSMENT — MIFFLIN-ST. JEOR: SCORE: 1832.18

## 2020-11-17 NOTE — PROGRESS NOTES
"[facility]   Waleska Marie is being evaluated via a billable video visit.   The patient has been notified of following:  \"This video visit will be conducted via a call between you and your provider. We have found that certain health care needs can be provided without the need for an in-person physical exam.  This service lets us provide the care you need with a video conversation. If during the course of the call the provider feels a video visit is not appropriate, you will not be charged for this service.\"   The provider has received verbal consent for a Video Visit from the patient or first contact? Yes  Patient  or facility staff would like the video invitation sent by: N/A   Video Start Time: 9:10 AM    [place] Medical Record Number:  [id number]  Place of Location at the time of visit: [facility]  Chief Complaint   Patient presents with     RECHECK     HPI:  Waleska Marie  is a 75 year old (1945), who is being seen today for a visit.  HPI information obtained from: facility chart records, facility staff, patient report and [facility] chart review.    Waleska Marie is a 75 year old male with PMH significant for HTN, mantle cell lymphoma, COPD and atrial fibrillation. She was hospitalized at [facility] from 10/8/2020 to 10/21/2020 for acute renal failure, hyperkalemia, and metabolic acidosis. She underwent emergent HD. EVER multifactorial due to suspected decreased po intake, ACE-I and diuretic. PTA lisinopril, glargine, metformin, pioglitazone and KCl were discontinued. Furosemide was held during hospitalization and then resumed at a split BID dose. She also received 2 units PRBCs for hgb 5.8 and was started on iron supplement and PPI. Discharged to TCU for physical rehabilitation and medical management.    While at TCU was noted to have hematuria and treated for UTI. Also diagnosed with COVID 19 on 11/3.    Today Waleska was seen for routine follow up " at TCU. Blood sugars elevated 200-300s. Prior to hospitalization was using 35 units daily. Reports ongoing cough, SOB. Denies that cough is worse than it has been. Denies edema. Reports appetite is ok. No dysuria, hematuria. VS reviewed. Is working with therapy. Nursing with no concerns today.    Past Medical and Surgical History reviewed in Epic today.  MEDICATIONS:    Current Outpatient Medications   Medication Sig Dispense Refill     acetaminophen (TYLENOL) 325 MG tablet Take 3 tablets (975 mg) by mouth 3 times daily       albuterol (PROAIR HFA/PROVENTIL HFA/VENTOLIN HFA) 108 (90 BASE) MCG/ACT Inhaler Inhale 2 puffs into the lungs every 4 hours as needed for shortness of breath / dyspnea or wheezing       apixaban ANTICOAGULANT (ELIQUIS) 5 MG tablet Take 5 mg by mouth 2 times daily       bisacodyl (DULCOLAX) 5 MG EC tablet Take 5 mg by mouth daily        cetirizine HCl (ZYRTEC ALLERGY) 10 MG CAPS Take 5 mg by mouth daily       digoxin (LANOXIN) 125 MCG tablet Take 125 mcg by mouth daily       ferrous sulfate (FEROSUL) 325 (65 Fe) MG tablet Take 1 tablet (325 mg) by mouth 2 times daily       fluticasone (FLOVENT HFA) 220 MCG/ACT Inhaler Inhale 2 puffs into the lungs 2 times daily        furosemide (LASIX) 40 MG tablet Take 1 tablet (40 mg) by mouth 2 times daily       GLUCOSAMINE-CHONDROITIN PO Take 2 capsules by mouth 2 times daily       hydrOXYzine (ATARAX) 25 MG tablet Take 2 tablets (50 mg) by mouth every 4 hours as needed for itching 30 tablet 0     ibrutinib (IMBRUVICA) 140 MG capsule Take 560 mg by mouth daily        insulin aspart (NOVOLOG PEN) 100 UNIT/ML pen Inject Subcutaneous At Bedtime For Bedtime: 200 - 239 give 1 units; 240 - 289 give 2 units; 290 - 339 give 3 units; = or >340 give 4 units and contact provider.       insulin aspart (NOVOLOG PEN) 100 UNIT/ML pen Inject Subcutaneous 3 times daily (with meals) TID AC: 140 - 189 = 2 unit; 190 - 239 = 3 unit; 240 - 289 = 4 unit; 290 - 339 = 5 unit; 340+  "= 6 unit = or >340 give 6 units contact provider       insulin glargine (LANTUS PEN) 100 UNIT/ML pen Inject 15 Units Subcutaneous At Bedtime       METHYL SALICYLATE EX Externally apply topically 2 times daily        metoprolol (LOPRESSOR) 50 MG tablet Take 25 mg by mouth 2 times daily       Multiple Vitamins-Minerals (MULTIVITAMIN ADULT PO) Take 1 tablet by mouth daily        omeprazole (PRILOSEC) 20 MG DR capsule Take 1 capsule (20 mg) by mouth daily       oxybutynin (DITROPAN) 5 MG tablet Take 5 mg by mouth 2 times daily       polyethylene glycol (MIRALAX) 17 g packet Take 17 g by mouth 2 times daily as needed for constipation Hold for loose stools.       potassium chloride ER (K-TAB) 20 MEQ CR tablet Take 20 mEq by mouth 2 times daily       senna-docusate (SENOKOT-S/PERICOLACE) 8.6-50 MG tablet Take 1 tablet by mouth 2 times daily as needed for constipation       simvastatin (ZOCOR) 10 MG tablet Take 10 mg by mouth At Bedtime       SOY ISOFLAVONE PO Take 198 mg by mouth daily       TiZANidine HCl (ZANAFLEX) 2 MG CAPS Take 2 mg by mouth 4 times daily as needed        vitamin C (ASCORBIC ACID) 500 MG tablet Take 1 tablet (500 mg) by mouth daily       famotidine (PEPCID) 10 MG tablet Take 10 mg by mouth 2 times daily       ipratropium - albuterol 0.5 mg/2.5 mg/3 mL (DUONEB) 0.5-2.5 (3) MG/3ML neb solution Take 1 vial (3 mLs) by nebulization every 4 hours as needed for wheezing (Patient not taking: Reported on 10/28/2020)       REVIEW OF SYSTEMS: 4 point ROS including Respiratory, CV, GI and , other than that noted in the HPI,  is negative  Objective: BP (!) 145/77   Pulse 54   Temp 98.7  F (37.1  C)   Resp 18   Ht 1.778 m (5' 10\")   Wt 125.2 kg (276 lb)   SpO2 92%   BMI 39.60 kg/m    Limited visit exam done given COVID-19 precautions.   GENERAL APPEARANCE:  Alert, pleasant and cooperative, elderly female in no distress  HEENT: normocephalic, moist mucous membranes, nose without drainage or crusting  RESP:  " respiratory effort normal, no respiratory distress, patient is on RA  PSYCH: affect and mood normal    Labs:   Labs done in SNF are in [place] EPIC. Please refer to them using [employer] Everywhere.    ASSESSMENT/PLAN:  (E11.22,  N18.30,  Z79.4) Type 2 diabetes mellitus with stage 3 chronic kidney disease, with long-term current use of insulin, unspecified whether stage 3a or 3b CKD (H)  (primary encounter diagnosis)  Comment: acute, BS not well controlled   Hemoglobin A1C   Date Value Ref Range Status   11/09/2020 6.5 (H) 0 - 5.6 % Final     Comment:     Normal <5.7% Prediabetes 5.7-6.4%  Diabetes 6.5% or higher - adopted from ADA   consensus guidelines.     -Glargine, Metformin and Pioglitazone discontinued during hospitalization.  Plan: Increase lantus to 15 U subcutaneous at bedtime. Continue novolog sliding scale with meals and at bedtime. QID BS. Avoid hypoglycemia    (U07.1) COVID-19 virus detected  Comment: acute, detected on screening, with cough, denies SOB- respiratory status stable- on RA, afebrile  Plan: Continue supportive cares including albuterol PRN, tylenol PRN. Isolation. Pulmonary toilet. VS q shift. Nursing to update provider with changes.      (N39.0,  R31.9) Urinary tract infection with hematuria, site unspecified  Comment: resolved  -completed bactrim 11/11  Plan: Continue to monitor     (D50.0) Iron deficiency anemia due to chronic blood loss  Comment: acute, stable since hospital discharge  -baseline hgb 8-9  Plan: Hgb 11/18. Continue iron supplement, vitamin c. Monitor for bleeding.    (I12.9) Benign hypertensive kidney disease with chronic kidney disease stage I through stage IV, or unspecified  (E78.5) Hyperlipidemia, unspecified hyperlipidemia type  Comment: chronic, SBP mainly 120-140s  Plan: Continue lasix, digoxin, metoprolol with hold parameters. Monitor BP and adjust medications as needed.    (N18.30) Stage 3 chronic kidney disease, unspecified whether stage 3a or 3b CKD  Comment:  chronic, required HD during hospitalization  -baseline creatinine low 1s  -creatinine at last check at baseline  Plan: BMP 11/18. Avoid nephrotoxins.    (I89.0) Lymphedema of both lower extremities  Comment: chronic, no worsening edema, weight trending down 276 lb today, 282 lb 1 week ago  Plan: Continue lasix and lymph wraps.    (I48.20) Chronic atrial fibrillation (H)  Comment: chronic, HR mainly 60-80s  Plan: Continue metoprolol, Digoxin, Aspirin and Apixaban as ordered with hold parameters. Monitor HR.    (J44.9) Chronic obstructive pulmonary disease, unspecified COPD type (H)  Comment: chronic. Does not appear to have exacerbation with COVID as above  Plan: Continue Fluticasone and Albuterol as ordered. DuoNebs on hold while in TCU given COVID-19 pandemic. If worsening respiratory status or exacerbation consider steroids.    (C83.10) Mantle Cell Lymphoma  Comment: chronic, followed by [facility].   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   Plan: Continue Ibrutinib as ordered which patient is supplying.    (R53.81) Physical deconditioning  Comment: Acute, secondary to recent hospitalization, medical conditions as above  Plan: Encourage participation in physical therapy/occupational therapy for strengthening and deconditioning. Discharge planning per their recommendation. Social work to assist with d/c planning.        Electronically signed by:  GASTON Muhammad CNP     Video-Visit Details  Type of service:  Video Visit  Video End Time (time video stopped): 9:16AM  Distant Location (provider location):  [facility]

## 2020-11-17 NOTE — LETTER
"    11/17/2020        RE: Waleska Marie  [address] [facility] [address]        [facility]   Waleska Marie is being evaluated via a billable video visit.   The patient has been notified of following:  \"This video visit will be conducted via a call between you and your provider. We have found that certain health care needs can be provided without the need for an in-person physical exam.  This service lets us provide the care you need with a video conversation. If during the course of the call the provider feels a video visit is not appropriate, you will not be charged for this service.\"   The provider has received verbal consent for a Video Visit from the patient or first contact? Yes  Patient  or facility staff would like the video invitation sent by: N/A   Video Start Time: 9:10 AM    [place] Medical Record Number:  [id number]  Place of Location at the time of visit: [facility]  Chief Complaint   Patient presents with     RECHECK     HPI:  Waleska Marie  is a 75 year old (1945), who is being seen today for a visit.  HPI information obtained from: facility chart records, facility staff, patient report and [facility] chart review.    Waleska Marie is a 75 year old male with PMH significant for HTN, mantle cell lymphoma, COPD and atrial fibrillation. She was hospitalized at [facility] from 10/8/2020 to 10/21/2020 for acute renal failure, hyperkalemia, and metabolic acidosis. She underwent emergent HD. EVER multifactorial due to suspected decreased po intake, ACE-I and diuretic. PTA lisinopril, glargine, metformin, pioglitazone and KCl were discontinued. Furosemide was held during hospitalization and then resumed at a split BID dose. She also received 2 units PRBCs for hgb 5.8 and was started on iron supplement and PPI. [facility].    While at [facility] was noted to have hematuria and treated for " UTI. Also diagnosed with COVID 19 on 11/3.    Today Waleska was seen for routine follow up at TCU. Blood sugars elevated 200-300s. Prior to hospitalization was using 35 units daily. Reports ongoing cough, SOB. Denies that cough is worse than it has been. Denies edema. Reports appetite is ok. No dysuria, hematuria. VS reviewed. Is working with therapy    Past Medical and Surgical History reviewed in Epic today.  MEDICATIONS:    Current Outpatient Medications   Medication Sig Dispense Refill     acetaminophen (TYLENOL) 325 MG tablet Take 3 tablets (975 mg) by mouth 3 times daily       albuterol (PROAIR HFA/PROVENTIL HFA/VENTOLIN HFA) 108 (90 BASE) MCG/ACT Inhaler Inhale 2 puffs into the lungs every 4 hours as needed for shortness of breath / dyspnea or wheezing       apixaban ANTICOAGULANT (ELIQUIS) 5 MG tablet Take 5 mg by mouth 2 times daily       bisacodyl (DULCOLAX) 5 MG EC tablet Take 5 mg by mouth daily        cetirizine HCl (ZYRTEC ALLERGY) 10 MG CAPS Take 5 mg by mouth daily       digoxin (LANOXIN) 125 MCG tablet Take 125 mcg by mouth daily       ferrous sulfate (FEROSUL) 325 (65 Fe) MG tablet Take 1 tablet (325 mg) by mouth 2 times daily       fluticasone (FLOVENT HFA) 220 MCG/ACT Inhaler Inhale 2 puffs into the lungs 2 times daily        furosemide (LASIX) 40 MG tablet Take 1 tablet (40 mg) by mouth 2 times daily       GLUCOSAMINE-CHONDROITIN PO Take 2 capsules by mouth 2 times daily       hydrOXYzine (ATARAX) 25 MG tablet Take 2 tablets (50 mg) by mouth every 4 hours as needed for itching 30 tablet 0     ibrutinib (IMBRUVICA) 140 MG capsule Take 560 mg by mouth daily        insulin aspart (NOVOLOG PEN) 100 UNIT/ML pen Inject Subcutaneous At Bedtime For Bedtime: 200 - 239 give 1 units; 240 - 289 give 2 units; 290 - 339 give 3 units; = or >340 give 4 units and contact provider.       insulin aspart (NOVOLOG PEN) 100 UNIT/ML pen Inject Subcutaneous 3 times daily (with meals) TID AC: 140 - 189 = 2 unit; 190 -  "239 = 3 unit; 240 - 289 = 4 unit; 290 - 339 = 5 unit; 340+ = 6 unit = or >340 give 6 units contact provider       insulin glargine (LANTUS PEN) 100 UNIT/ML pen Inject 15 Units Subcutaneous At Bedtime       METHYL SALICYLATE EX Externally apply topically 2 times daily        metoprolol (LOPRESSOR) 50 MG tablet Take 25 mg by mouth 2 times daily       Multiple Vitamins-Minerals (MULTIVITAMIN ADULT PO) Take 1 tablet by mouth daily        omeprazole (PRILOSEC) 20 MG DR capsule Take 1 capsule (20 mg) by mouth daily       oxybutynin (DITROPAN) 5 MG tablet Take 5 mg by mouth 2 times daily       polyethylene glycol (MIRALAX) 17 g packet Take 17 g by mouth 2 times daily as needed for constipation Hold for loose stools.       potassium chloride ER (K-TAB) 20 MEQ CR tablet Take 20 mEq by mouth 2 times daily       senna-docusate (SENOKOT-S/PERICOLACE) 8.6-50 MG tablet Take 1 tablet by mouth 2 times daily as needed for constipation       simvastatin (ZOCOR) 10 MG tablet Take 10 mg by mouth At Bedtime       SOY ISOFLAVONE PO Take 198 mg by mouth daily       TiZANidine HCl (ZANAFLEX) 2 MG CAPS Take 2 mg by mouth 4 times daily as needed        vitamin C (ASCORBIC ACID) 500 MG tablet Take 1 tablet (500 mg) by mouth daily       famotidine (PEPCID) 10 MG tablet Take 10 mg by mouth 2 times daily       ipratropium - albuterol 0.5 mg/2.5 mg/3 mL (DUONEB) 0.5-2.5 (3) MG/3ML neb solution Take 1 vial (3 mLs) by nebulization every 4 hours as needed for wheezing (Patient not taking: Reported on 10/28/2020)       REVIEW OF SYSTEMS: 4 point ROS including Respiratory, CV, GI and , other than that noted in the HPI,  is negative  Objective: BP (!) 145/77   Pulse 54   Temp 98.7  F (37.1  C)   Resp 18   Ht 1.778 m (5' 10\")   Wt 125.2 kg (276 lb)   SpO2 92%   BMI 39.60 kg/m    Limited visit exam done given COVID-19 precautions.   GENERAL APPEARANCE:  Alert, pleasant and cooperative, elderly female in no distress  HEENT: normocephalic, moist " mucous membranes, nose without drainage or crusting  RESP:  respiratory effort normal, no respiratory distress, patient is on RA  PSYCH: affect and mood normal    Labs:   Labs done in SNF are in [place] EPIC. Please refer to them using [employer]Care Everywhere.    ASSESSMENT/PLAN:  (E11.22,  N18.30,  Z79.4) Type 2 diabetes mellitus with stage 3 chronic kidney disease, with long-term current use of insulin, unspecified whether stage 3a or 3b CKD (H)  (primary encounter diagnosis)  Comment: acute, BS not well controlled   Hemoglobin A1C   Date Value Ref Range Status   11/09/2020 6.5 (H) 0 - 5.6 % Final     Comment:     Normal <5.7% Prediabetes 5.7-6.4%  Diabetes 6.5% or higher - adopted from ADA   consensus guidelines.     -Glargine, Metformin and Pioglitazone discontinued during hospitalization.  Plan: Increase lantus to 15 U subcutaneous at bedtime. Continue novolog sliding scale with meals and at bedtime. QID BS. Avoid hypoglycemia    (U07.1) COVID-19 virus detected  Comment: acute, detected on screening, with cough, denies SOB- respiratory status stable- on RA, afebrile  Plan: Continue supportive cares including albuterol PRN, tylenol PRN. Isolation. Pulmonary toilet. VS q shift. Nursing to update provider with changes.      (N39.0,  R31.9) Urinary tract infection with hematuria, site unspecified  Comment: resolved  -completed bactrim 11/11  Plan: Continue to monitor     (D50.0) Iron deficiency anemia due to chronic blood loss  Comment: acute, stable since hospital discharge  -baseline hgb 8-9  Plan: Hgb 11/18. Continue iron supplement, vitamin c. Monitor for bleeding.    (I12.9) Benign hypertensive kidney disease with chronic kidney disease stage I through stage IV, or unspecified  (E78.5) Hyperlipidemia, unspecified hyperlipidemia type  Comment: chronic, SBP mainly 120-140s  Plan: Continue lasix, digoxin, metoprolol with hold parameters. Monitor BP and adjust medications as needed.    (N18.30) Stage 3 chronic  kidney disease, unspecified whether stage 3a or 3b CKD  Comment: chronic, required HD during hospitalization  -baseline creatinine low 1s  -creatinine at last check at baseline  Plan: BMP 11/18. Avoid nephrotoxins.    (I89.0) Lymphedema of both lower extremities  Comment: chronic, no worsening edema, weight trending down 276 lb today, 282 lb 1 week ago  Plan: Continue lasix and lymph wraps.    (I48.20) Chronic atrial fibrillation (H)  Comment: chronic, HR mainly 60-80s  Plan: Continue metoprolol, Digoxin, Aspirin and Apixaban as ordered with hold parameters. Monitor HR.    (J44.9) Chronic obstructive pulmonary disease, unspecified COPD type (H)  Comment: chronic. Does not appear to have exacerbation with COVID as above  Plan: Continue Fluticasone and Albuterol as ordered. DuoNebs on hold while in TCU given COVID-19 pandemic. If worsening respiratory status or exacerbation consider steroids.    (C83.10) Mantle Cell Lymphoma  Comment: chronic, followed by [facility].   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   Plan: Continue Ibrutinib as ordered which patient is supplying.    (R53.81) Physical deconditioning  Comment: Acute, secondary to recent hospitalization, medical conditions as above  Plan: Encourage participation in physical therapy/occupational therapy for strengthening and deconditioning. Discharge planning per their recommendation. Social work to assist with d/c planning.        Electronically signed by:  GASTON Muhammad CNP     Video-Visit Details  Type of service:  Video Visit  Video End Time (time video stopped): 9:16AM  Distant Location (provider location):  [place] [facility]               Sincerely,        GASTON Muhammad CNP

## 2020-11-18 ENCOUNTER — NURSING HOME VISIT (OUTPATIENT)
Dept: GERIATRICS | Facility: CLINIC | Age: 75
End: 2020-11-18
Payer: MEDICARE

## 2020-11-18 ENCOUNTER — HOSPITAL LABORATORY (OUTPATIENT)
Dept: OTHER | Facility: CLINIC | Age: 75
End: 2020-11-18

## 2020-11-18 VITALS
RESPIRATION RATE: 18 BRPM | HEIGHT: 70 IN | DIASTOLIC BLOOD PRESSURE: 75 MMHG | SYSTOLIC BLOOD PRESSURE: 157 MMHG | TEMPERATURE: 97.5 F | WEIGHT: 276.6 LBS | OXYGEN SATURATION: 96 % | HEART RATE: 71 BPM | BODY MASS INDEX: 39.6 KG/M2

## 2020-11-18 VITALS
OXYGEN SATURATION: 94 % | HEIGHT: 70 IN | BODY MASS INDEX: 39.77 KG/M2 | HEART RATE: 60 BPM | DIASTOLIC BLOOD PRESSURE: 78 MMHG | RESPIRATION RATE: 18 BRPM | SYSTOLIC BLOOD PRESSURE: 136 MMHG | TEMPERATURE: 97.4 F | WEIGHT: 277.8 LBS

## 2020-11-18 DIAGNOSIS — R06.02 SOB (SHORTNESS OF BREATH): ICD-10-CM

## 2020-11-18 DIAGNOSIS — R31.9 URINARY TRACT INFECTION WITH HEMATURIA, SITE UNSPECIFIED: ICD-10-CM

## 2020-11-18 DIAGNOSIS — N39.0 URINARY TRACT INFECTION WITH HEMATURIA, SITE UNSPECIFIED: ICD-10-CM

## 2020-11-18 DIAGNOSIS — U07.1 COVID-19 VIRUS DETECTED: Primary | ICD-10-CM

## 2020-11-18 LAB
ALBUMIN UR-MCNC: 100 MG/DL
ANION GAP SERPL CALCULATED.3IONS-SCNC: 7 MMOL/L (ref 3–14)
APPEARANCE UR: CLEAR
BACTERIA #/AREA URNS HPF: ABNORMAL /HPF
BILIRUB UR QL STRIP: NEGATIVE
BUN SERPL-MCNC: 26 MG/DL (ref 7–30)
CALCIUM SERPL-MCNC: 8.1 MG/DL (ref 8.5–10.1)
CHLORIDE SERPL-SCNC: 104 MMOL/L (ref 94–109)
CO2 SERPL-SCNC: 26 MMOL/L (ref 20–32)
COLOR UR AUTO: ABNORMAL
CREAT SERPL-MCNC: 0.98 MG/DL (ref 0.52–1.04)
GFR SERPL CREATININE-BSD FRML MDRD: 57 ML/MIN/{1.73_M2}
GLUCOSE SERPL-MCNC: 162 MG/DL (ref 70–99)
GLUCOSE UR STRIP-MCNC: NEGATIVE MG/DL
HGB BLD-MCNC: 9.4 G/DL (ref 11.7–15.7)
HGB UR QL STRIP: ABNORMAL
HYALINE CASTS #/AREA URNS LPF: 14 /LPF (ref 0–2)
KETONES UR STRIP-MCNC: NEGATIVE MG/DL
LEUKOCYTE ESTERASE UR QL STRIP: ABNORMAL
NITRATE UR QL: NEGATIVE
PH UR STRIP: 6.5 PH (ref 5–7)
POTASSIUM SERPL-SCNC: 3.9 MMOL/L (ref 3.4–5.3)
RBC #/AREA URNS AUTO: >182 /HPF (ref 0–2)
SODIUM SERPL-SCNC: 137 MMOL/L (ref 133–144)
SOURCE: ABNORMAL
SP GR UR STRIP: 1.01 (ref 1–1.03)
UROBILINOGEN UR STRIP-MCNC: NORMAL MG/DL (ref 0–2)
WBC #/AREA URNS AUTO: 105 /HPF (ref 0–5)

## 2020-11-18 PROCEDURE — 99309 SBSQ NF CARE MODERATE MDM 30: CPT | Performed by: NURSE PRACTITIONER

## 2020-11-18 ASSESSMENT — MIFFLIN-ST. JEOR
SCORE: 1834.9
SCORE: 1840.34

## 2020-11-18 NOTE — PROGRESS NOTES
"[facility]  [place] Medical Record Number:  [id number]  Place of Service where encounter took place:  [facility] ([facility] [[id number]]  Chief Complaint   Patient presents with     Nursing Home Acute       HPI:    Waleska Marie  is a 75 year old (1945), who is being seen today for an episodic care visit.  HPI information obtained from: facility chart records, facility staff, patient report and [facility] chart review. Today's concern is:   Today patient seen in TCU for c/o SOB and malaise. COVID 19 PCR + on screening 11/3 but has not had much in way of s/s since diagnosis. VSS and afebrile. UA ++ UC pending. Patient reports does not feel good. Reports headache and SOB. Reports being \"queezy\" and states appetite has not been good. VS reviewed.     Past Medical and Surgical History reviewed in Epic today.    MEDICATIONS:      Current Outpatient Medications   Medication Sig Dispense Refill     acetaminophen (TYLENOL) 325 MG tablet Take 3 tablets (975 mg) by mouth 3 times daily       albuterol (PROAIR HFA/PROVENTIL HFA/VENTOLIN HFA) 108 (90 BASE) MCG/ACT Inhaler Inhale 2 puffs into the lungs every 4 hours as needed for shortness of breath / dyspnea or wheezing       apixaban ANTICOAGULANT (ELIQUIS) 5 MG tablet Take 5 mg by mouth 2 times daily       bisacodyl (DULCOLAX) 5 MG EC tablet Take 5 mg by mouth daily        cetirizine HCl (ZYRTEC ALLERGY) 10 MG CAPS Take 5 mg by mouth daily       digoxin (LANOXIN) 125 MCG tablet Take 125 mcg by mouth daily       famotidine (PEPCID) 10 MG tablet Take 10 mg by mouth 2 times daily       ferrous sulfate (FEROSUL) 325 (65 Fe) MG tablet Take 1 tablet (325 mg) by mouth 2 times daily       fluticasone (FLOVENT HFA) 220 MCG/ACT Inhaler Inhale 2 puffs into the lungs 2 times daily        furosemide (LASIX) 40 MG tablet Take 1 tablet (40 mg) by mouth 2 times daily       GLUCOSAMINE-CHONDROITIN PO Take 2 capsules by mouth 2 times daily       " hydrOXYzine (ATARAX) 25 MG tablet Take 2 tablets (50 mg) by mouth every 4 hours as needed for itching 30 tablet 0     ibrutinib (IMBRUVICA) 140 MG capsule Take 560 mg by mouth daily        insulin aspart (NOVOLOG PEN) 100 UNIT/ML pen Inject Subcutaneous At Bedtime For Bedtime: 200 - 239 give 1 units; 240 - 289 give 2 units; 290 - 339 give 3 units; = or >340 give 4 units and contact provider.       insulin aspart (NOVOLOG PEN) 100 UNIT/ML pen Inject Subcutaneous 3 times daily (with meals) TID AC: 140 - 189 = 2 unit; 190 - 239 = 3 unit; 240 - 289 = 4 unit; 290 - 339 = 5 unit; 340+ = 6 unit = or >340 give 6 units contact provider       insulin glargine (LANTUS PEN) 100 UNIT/ML pen Inject 15 Units Subcutaneous At Bedtime       ipratropium - albuterol 0.5 mg/2.5 mg/3 mL (DUONEB) 0.5-2.5 (3) MG/3ML neb solution Take 1 vial (3 mLs) by nebulization every 4 hours as needed for wheezing (Patient not taking: Reported on 10/28/2020)       METHYL SALICYLATE EX Externally apply topically 2 times daily        metoprolol (LOPRESSOR) 50 MG tablet Take 25 mg by mouth 2 times daily       Multiple Vitamins-Minerals (MULTIVITAMIN ADULT PO) Take 1 tablet by mouth daily        omeprazole (PRILOSEC) 20 MG DR capsule Take 1 capsule (20 mg) by mouth daily       oxybutynin (DITROPAN) 5 MG tablet Take 5 mg by mouth 2 times daily       polyethylene glycol (MIRALAX) 17 g packet Take 17 g by mouth 2 times daily as needed for constipation Hold for loose stools.       potassium chloride ER (K-TAB) 20 MEQ CR tablet Take 20 mEq by mouth 2 times daily       senna-docusate (SENOKOT-S/PERICOLACE) 8.6-50 MG tablet Take 1 tablet by mouth 2 times daily as needed for constipation       simvastatin (ZOCOR) 10 MG tablet Take 10 mg by mouth At Bedtime       SOY ISOFLAVONE PO Take 198 mg by mouth daily       TiZANidine HCl (ZANAFLEX) 2 MG CAPS Take 2 mg by mouth 4 times daily as needed        vitamin C (ASCORBIC ACID) 500 MG tablet Take 1 tablet (500 mg) by  "mouth daily           REVIEW OF SYSTEMS:  4 point ROS including Respiratory, CV, GI and , other than that noted in the HPI,  is negative    Objective:  BP (!) 157/75   Pulse 71   Temp 97.5  F (36.4  C)   Resp 18   Ht 1.778 m (5' 10\")   Wt 125.5 kg (276 lb 9.6 oz)   SpO2 96%   BMI 39.69 kg/m    Exam:  General appearance, pale, sick  Resp: Effort WNL, LS decreased in bases.   CV: VS as above, chronic LE edema  Abd- soft, nontender, BS +  Musc- DIXON  Psych- alert, calm, fatigued      Labs:   Recent labs in [facility] reviewed by me today.     ASSESSMENT/PLAN:  COVID-19 virus detected  SOB (shortness of breath)    - 11/3 and has not had s/s. Resp status stable with good O2 sats and afebrile. CXR obtained todaywith no acute pathology  - pulmonary toilet  - VS and resp assessment q shift    Urinary tract infection with hematuria, site unspecified  *- recently tx for such and with gross hematuria- afebrile  - BMP and CBC ordered  - awaiting UC and sensitivities for now  - encourage adequate hydration  - VS every shift  - follow clinically        Orders written by provider at facility      Electronically signed by:  GASTON Baxter CNP             "

## 2020-11-18 NOTE — TELEPHONE ENCOUNTER
Patient with Hx UTI and hematuria, changing brief this evening and medium pink urine output, completed bactrim for uti, on eliquis, HGB's trend 7.4-7.8 range, VS WNL, 98.4, asymptomatic.  -UA/UC, OK to straight cath  -already has BMP/HGB tomorrow, change HGB to CBC

## 2020-11-18 NOTE — LETTER
"    11/18/2020        RE: Waleska Marie  [address]  [place] [address]        [facility]  [place] Medical Record Number:  [id number]  Place of Service where encounter took place:  [facility] [[id number]]  Chief Complaint   Patient presents with     Nursing Home Acute       HPI:    Waleska Marie  is a 75 year old (1945), who is being seen today for an episodic care visit.  HPI information obtained from: facility chart records, facility staff, patient report and [facility] chart review. Today's concern is:   Today patient seen in TCU for c/o SOB and malaise. COVID 19 PCR + on screening 11/3 but has not had much in way of s/s since diagnosis. VSS and afebrile. UA ++ UC pending. Patient reports does not feel good. Reports headache and SOB. Reports being \"queezy\" and states appetite has not been good. VS reviewed.     Past Medical and Surgical History reviewed in Epic today.    MEDICATIONS:      Current Outpatient Medications   Medication Sig Dispense Refill     acetaminophen (TYLENOL) 325 MG tablet Take 3 tablets (975 mg) by mouth 3 times daily       albuterol (PROAIR HFA/PROVENTIL HFA/VENTOLIN HFA) 108 (90 BASE) MCG/ACT Inhaler Inhale 2 puffs into the lungs every 4 hours as needed for shortness of breath / dyspnea or wheezing       apixaban ANTICOAGULANT (ELIQUIS) 5 MG tablet Take 5 mg by mouth 2 times daily       bisacodyl (DULCOLAX) 5 MG EC tablet Take 5 mg by mouth daily        cetirizine HCl (ZYRTEC ALLERGY) 10 MG CAPS Take 5 mg by mouth daily       digoxin (LANOXIN) 125 MCG tablet Take 125 mcg by mouth daily       famotidine (PEPCID) 10 MG tablet Take 10 mg by mouth 2 times daily       ferrous sulfate (FEROSUL) 325 (65 Fe) MG tablet Take 1 tablet (325 mg) by mouth 2 times daily       fluticasone (FLOVENT HFA) 220 MCG/ACT Inhaler Inhale 2 puffs into the lungs 2 times daily        furosemide (LASIX) 40 MG tablet Take 1 tablet (40 mg) by mouth 2 times " daily       GLUCOSAMINE-CHONDROITIN PO Take 2 capsules by mouth 2 times daily       hydrOXYzine (ATARAX) 25 MG tablet Take 2 tablets (50 mg) by mouth every 4 hours as needed for itching 30 tablet 0     ibrutinib (IMBRUVICA) 140 MG capsule Take 560 mg by mouth daily        insulin aspart (NOVOLOG PEN) 100 UNIT/ML pen Inject Subcutaneous At Bedtime For Bedtime: 200 - 239 give 1 units; 240 - 289 give 2 units; 290 - 339 give 3 units; = or >340 give 4 units and contact provider.       insulin aspart (NOVOLOG PEN) 100 UNIT/ML pen Inject Subcutaneous 3 times daily (with meals) TID AC: 140 - 189 = 2 unit; 190 - 239 = 3 unit; 240 - 289 = 4 unit; 290 - 339 = 5 unit; 340+ = 6 unit = or >340 give 6 units contact provider       insulin glargine (LANTUS PEN) 100 UNIT/ML pen Inject 15 Units Subcutaneous At Bedtime       ipratropium - albuterol 0.5 mg/2.5 mg/3 mL (DUONEB) 0.5-2.5 (3) MG/3ML neb solution Take 1 vial (3 mLs) by nebulization every 4 hours as needed for wheezing (Patient not taking: Reported on 10/28/2020)       METHYL SALICYLATE EX Externally apply topically 2 times daily        metoprolol (LOPRESSOR) 50 MG tablet Take 25 mg by mouth 2 times daily       Multiple Vitamins-Minerals (MULTIVITAMIN ADULT PO) Take 1 tablet by mouth daily        omeprazole (PRILOSEC) 20 MG DR capsule Take 1 capsule (20 mg) by mouth daily       oxybutynin (DITROPAN) 5 MG tablet Take 5 mg by mouth 2 times daily       polyethylene glycol (MIRALAX) 17 g packet Take 17 g by mouth 2 times daily as needed for constipation Hold for loose stools.       potassium chloride ER (K-TAB) 20 MEQ CR tablet Take 20 mEq by mouth 2 times daily       senna-docusate (SENOKOT-S/PERICOLACE) 8.6-50 MG tablet Take 1 tablet by mouth 2 times daily as needed for constipation       simvastatin (ZOCOR) 10 MG tablet Take 10 mg by mouth At Bedtime       SOY ISOFLAVONE PO Take 198 mg by mouth daily       TiZANidine HCl (ZANAFLEX) 2 MG CAPS Take 2 mg by mouth 4 times daily as  "needed        vitamin C (ASCORBIC ACID) 500 MG tablet Take 1 tablet (500 mg) by mouth daily           REVIEW OF SYSTEMS:  4 point ROS including Respiratory, CV, GI and , other than that noted in the HPI,  is negative    Objective:  BP (!) 157/75   Pulse 71   Temp 97.5  F (36.4  C)   Resp 18   Ht 1.778 m (5' 10\")   Wt 125.5 kg (276 lb 9.6 oz)   SpO2 96%   BMI 39.69 kg/m    Exam:  General appearance, pale, sick  Resp: Effort WNL, LS decreased in bases.   CV: VS as above, chronic LE edema  Abd- soft, nontender, BS +  Musc- DIXON  Psych- alert, calm, fatigued      Labs:   Recent labs in [facility] reviewed by me today.     ASSESSMENT/PLAN:  COVID-19 virus detected  SOB (shortness of breath)    - 11/3 and has not had s/s. Resp status stable with good O2 sats and afebrile. CXR obtained todaywith no acute pathology  - pulmonary toilet  - VS and resp assessment q shift    Urinary tract infection with hematuria, site unspecified  *- recently tx for such and with gross hematuria- afebrile  - BMP and CBC ordered  - awaiting UC and sensitivities for now  - encourage adequate hydration  - VS every shift  - follow clinically        Orders written by provider at facility      Electronically signed by:  GASTON Baxter CNP                   Sincerely,        GASTON Baxter CNP    "

## 2020-11-19 ENCOUNTER — HOSPITAL LABORATORY (OUTPATIENT)
Dept: OTHER | Facility: CLINIC | Age: 75
End: 2020-11-19

## 2020-11-19 ENCOUNTER — VIRTUAL VISIT (OUTPATIENT)
Dept: GERIATRICS | Facility: CLINIC | Age: 75
End: 2020-11-19
Payer: MEDICARE

## 2020-11-19 DIAGNOSIS — R31.9 URINARY TRACT INFECTION WITH HEMATURIA, SITE UNSPECIFIED: ICD-10-CM

## 2020-11-19 DIAGNOSIS — N39.0 URINARY TRACT INFECTION WITH HEMATURIA, SITE UNSPECIFIED: ICD-10-CM

## 2020-11-19 DIAGNOSIS — U07.1 COVID-19 VIRUS DETECTED: Primary | ICD-10-CM

## 2020-11-19 DIAGNOSIS — D50.0 IRON DEFICIENCY ANEMIA DUE TO CHRONIC BLOOD LOSS: ICD-10-CM

## 2020-11-19 DIAGNOSIS — J44.9 CHRONIC OBSTRUCTIVE PULMONARY DISEASE, UNSPECIFIED COPD TYPE (H): ICD-10-CM

## 2020-11-19 DIAGNOSIS — N18.30 STAGE 3 CHRONIC KIDNEY DISEASE, UNSPECIFIED WHETHER STAGE 3A OR 3B CKD (H): ICD-10-CM

## 2020-11-19 LAB
BASOPHILS # BLD AUTO: 0 10E9/L (ref 0–0.2)
BASOPHILS NFR BLD AUTO: 0.6 %
DIFFERENTIAL METHOD BLD: ABNORMAL
DIGOXIN SERPL-MCNC: 1.2 UG/L (ref 0.5–2)
EOSINOPHIL # BLD AUTO: 0 10E9/L (ref 0–0.7)
EOSINOPHIL NFR BLD AUTO: 0.1 %
ERYTHROCYTE [DISTWIDTH] IN BLOOD BY AUTOMATED COUNT: 18.1 % (ref 10–15)
HCT VFR BLD AUTO: 29.9 % (ref 35–47)
HGB BLD-MCNC: 8.8 G/DL (ref 11.7–15.7)
IMM GRANULOCYTES # BLD: 0.1 10E9/L (ref 0–0.4)
IMM GRANULOCYTES NFR BLD: 0.7 %
LYMPHOCYTES # BLD AUTO: 1.3 10E9/L (ref 0.8–5.3)
LYMPHOCYTES NFR BLD AUTO: 18.4 %
MCH RBC QN AUTO: 28.2 PG (ref 26.5–33)
MCHC RBC AUTO-ENTMCNC: 29.4 G/DL (ref 31.5–36.5)
MCV RBC AUTO: 96 FL (ref 78–100)
MONOCYTES # BLD AUTO: 0.4 10E9/L (ref 0–1.3)
MONOCYTES NFR BLD AUTO: 5.1 %
NEUTROPHILS # BLD AUTO: 5.4 10E9/L (ref 1.6–8.3)
NEUTROPHILS NFR BLD AUTO: 75.1 %
NRBC # BLD AUTO: 0 10*3/UL
NRBC BLD AUTO-RTO: 0 /100
PLATELET # BLD AUTO: 343 10E9/L (ref 150–450)
RBC # BLD AUTO: 3.12 10E12/L (ref 3.8–5.2)
WBC # BLD AUTO: 7.2 10E9/L (ref 4–11)

## 2020-11-19 PROCEDURE — 99309 SBSQ NF CARE MODERATE MDM 30: CPT | Mod: 95 | Performed by: NURSE PRACTITIONER

## 2020-11-19 NOTE — PATIENT INSTRUCTIONS
Orders  Waleska Marie    1) Cefdinir 300 mg po BID x 5 days. Diagnosis: UTI. First dose today. Update provider when sensitivities on urine culture are back.     GASTON Muhammad CNP on 11/19/2020 at 1:48 PM

## 2020-11-19 NOTE — LETTER
"    11/19/2020        RE: Waleska Marie  [address] [address]        [facility]   Waleska Marie is being evaluated via a billable video visit.   The patient has been notified of following:  \"This video visit will be conducted via a call between you and your provider. We have found that certain health care needs can be provided without the need for an in-person physical exam.  This service lets us provide the care you need with a video conversation. If during the course of the call the provider feels a video visit is not appropriate, you will not be charged for this service.\"   The provider has received verbal consent for a Video Visit from the patient or first contact? Yes  Patient  or facility staff would like the video invitation sent by: N/A   Video Start Time: 8:15AM    [place] Medical Record Number:  [id number]  Place of Location at the time of visit: [facility]  Chief Complaint   Patient presents with     RECHECK     HPI:  Waleska Marie  is a 75 year old (1945), who is being seen today for a visit.  HPI information obtained from: facility chart records, facility staff, patient report and [facility] chart review.     Waleska Marie is a 75 year old male with PMH significant for HTN, mantle cell lymphoma, COPD and atrial fibrillation. She was hospitalized at [facility] from 10/8/2020 to 10/21/2020 for acute renal failure, hyperkalemia, and metabolic acidosis. She underwent emergent HD. EVER multifactorial due to suspected decreased po intake, ACE-I and diuretic. PTA lisinopril, glargine, metformin, pioglitazone and KCl were discontinued. Furosemide was held during hospitalization and then resumed at a split BID dose. She also received 2 units PRBCs for hgb 5.8 and was started on iron supplement and PPI. [facility].     [facility] was noted to have hematuria and treated for " "UTI- completed antibiotics 11/11. Also diagnosed with COVID 19 on 11/3.    Waleska was seen today for episodic follow up. Yesterday she was noted to have new SOB, cough, pallor, and recurrent hematuria. Xray negative for pneumonia. UA positive for UTI, culture pending. Waleska tells me she is \"feeling miserable today.\" She feels very SOB, but her breathing is not worse than yesterday. She continues to remain on RA and is not needing oxygen. Reports productive cough.Nursing reports she is using albuterol frequently and it is helpful. She is chilling, but does not have temperature. Reports poor appetite. Denies any edema to legs. She tells me she has hematuria today as well. VS reviewed       Past Medical and Surgical History reviewed in Epic today.  MEDICATIONS:    Current Outpatient Medications   Medication Sig Dispense Refill     acetaminophen (TYLENOL) 325 MG tablet Take 3 tablets (975 mg) by mouth 3 times daily       albuterol (PROAIR HFA/PROVENTIL HFA/VENTOLIN HFA) 108 (90 BASE) MCG/ACT Inhaler Inhale 2 puffs into the lungs every 4 hours as needed for shortness of breath / dyspnea or wheezing       apixaban ANTICOAGULANT (ELIQUIS) 5 MG tablet Take 5 mg by mouth 2 times daily       bisacodyl (DULCOLAX) 5 MG EC tablet Take 5 mg by mouth daily        cetirizine HCl (ZYRTEC ALLERGY) 10 MG CAPS Take 5 mg by mouth daily       digoxin (LANOXIN) 125 MCG tablet Take 125 mcg by mouth daily       ferrous sulfate (FEROSUL) 325 (65 Fe) MG tablet Take 1 tablet (325 mg) by mouth 2 times daily       fluticasone (FLOVENT HFA) 220 MCG/ACT Inhaler Inhale 2 puffs into the lungs 2 times daily        furosemide (LASIX) 40 MG tablet Take 1 tablet (40 mg) by mouth 2 times daily       GLUCOSAMINE-CHONDROITIN PO Take 2 capsules by mouth 2 times daily       guaiFENesin (MUCINEX) 600 MG 12 hr tablet Take 1 tablet (600 mg) by mouth 2 times daily       hydrOXYzine (ATARAX) 25 MG tablet Take 2 tablets (50 mg) by mouth every 4 hours as needed " for itching 30 tablet 0     ibrutinib (IMBRUVICA) 140 MG capsule Take 560 mg by mouth daily        insulin aspart (NOVOLOG PEN) 100 UNIT/ML pen Inject Subcutaneous At Bedtime For Bedtime: 200 - 239 give 1 units; 240 - 289 give 2 units; 290 - 339 give 3 units; = or >340 give 4 units and contact provider.       insulin aspart (NOVOLOG PEN) 100 UNIT/ML pen Inject Subcutaneous 3 times daily (with meals) TID AC: 140 - 189 = 2 unit; 190 - 239 = 3 unit; 240 - 289 = 4 unit; 290 - 339 = 5 unit; 340+ = 6 unit = or >340 give 6 units contact provider       insulin glargine (LANTUS PEN) 100 UNIT/ML pen Inject 15 Units Subcutaneous At Bedtime       METHYL SALICYLATE EX Externally apply topically 2 times daily        metoprolol (LOPRESSOR) 50 MG tablet Take 25 mg by mouth 2 times daily       Multiple Vitamins-Minerals (MULTIVITAMIN ADULT PO) Take 1 tablet by mouth daily        omeprazole (PRILOSEC) 20 MG DR capsule Take 1 capsule (20 mg) by mouth daily       oxybutynin (DITROPAN) 5 MG tablet Take 5 mg by mouth 2 times daily       polyethylene glycol (MIRALAX) 17 g packet Take 17 g by mouth 2 times daily as needed for constipation Hold for loose stools.       potassium chloride ER (K-TAB) 20 MEQ CR tablet Take 20 mEq by mouth 2 times daily       senna-docusate (SENOKOT-S/PERICOLACE) 8.6-50 MG tablet Take 1 tablet by mouth 2 times daily as needed for constipation       simvastatin (ZOCOR) 10 MG tablet Take 10 mg by mouth At Bedtime       SOY ISOFLAVONE PO Take 198 mg by mouth daily       TiZANidine HCl (ZANAFLEX) 2 MG CAPS Take 2 mg by mouth 4 times daily as needed        vitamin C (ASCORBIC ACID) 500 MG tablet Take 1 tablet (500 mg) by mouth daily       ipratropium - albuterol 0.5 mg/2.5 mg/3 mL (DUONEB) 0.5-2.5 (3) MG/3ML neb solution Take 1 vial (3 mLs) by nebulization every 4 hours as needed for wheezing (Patient not taking: Reported on 10/28/2020)       REVIEW OF SYSTEMS: 4 point ROS including Respiratory, CV, GI and , other  "than that noted in the HPI,  is negative  Objective: /78   Pulse 60   Temp 97.4  F (36.3  C)   Resp 18   Ht 1.778 m (5' 10\")   Wt 126 kg (277 lb 12.8 oz)   SpO2 94%   BMI 39.86 kg/m    Limited visit exam done given COVID-19 precautions.   GENERAL APPEARANCE:  Alert, pleasant and cooperative, pale in color, resting in bed at time of exam  HEENT: normocephalic, moist mucous membranes, nose without drainage or crusting  RESP: is visibly short of breath at times during visit today, patient is on RA  PSYCH: affect and mood normal    Labs:   Labs done in SNF are in [place] EPIC. Please refer to them using [facility] Everywhere.    ASSESSMENT/PLAN:  (U07.1) COVID-19 virus detected  Comment: acute, increased SOB, cough- however respiratory status remains stable and she is on RA  Plan: Continue supportive cares including albuterol PRN, guaifenesin, tylenol. Isolation. Pulmonary toilet. VS q shift. Nursing to update provider with changes. Does have COPD as below- could consider adding short steroid course if no improvement and treating for COPD exacerbation    (N39.0,  R31.9) Urinary tract infection with hematuria, site unspecified  Comment: acute, culture is still pending, but patient overall feeling terrible- suspect UTI is contributing to this and will start antibiotics today  -hgb is stable from previous checks even with ongoing hematuria  -completed bactrim 11/11  Plan: Start Cefdinir 300 mg po BID x 5 days. First dose today. Update provider when sensitivities on urine culture are back. Continue to monitor for worsening hematuria. Hgb PRN.     (D50.0) Iron deficiency anemia due to chronic blood loss  Comment: acute, stable since hospital discharge, even in light of hematuria as noted above   -baseline hgb 8-9  Hemoglobin   Date Value Ref Range Status   11/19/2020 8.8 (L) 11.7 - 15.7 g/dL Final   11/18/2020 9.4 (L) 11.7 - 15.7 g/dL Final     Plan: Hgb PRN. Continue iron supplement, vitamin c. Monitor for " ongoing bleeding.    (N18.30) Stage 3 chronic kidney disease, unspecified whether stage 3a or 3b CKD  Comment: chronic, required HD during hospitalization  -baseline creatinine low 1s  -creatinine at last check at baseline  Plan: BMP PRN. Avoid nephrotoxins.    (J44.9) Chronic obstructive pulmonary disease, unspecified COPD type (H)  Comment: chronic, per history- noted to have increased SOB due to COVID  Plan: Continue Fluticasone and Albuterol as ordered. DuoNebs on hold while in TCU given COVID-19 pandemic. If no improvement in SOB could consider short steroid course for exacerbation.      Electronically signed by:  GASTON Muhammad CNP     Video-Visit Details  Type of service:  Video Visit  Video End Time (time video stopped): 8:23AM  Distant Location (provider location):  [place] [facility]                 Sincerely,        GASTON Muhammad CNP

## 2020-11-19 NOTE — PROGRESS NOTES
"[facility]   Waleska Marie is being evaluated via a billable video visit.   The patient has been notified of following:  \"This video visit will be conducted via a call between you and your provider. We have found that certain health care needs can be provided without the need for an in-person physical exam.  This service lets us provide the care you need with a video conversation. If during the course of the call the provider feels a video visit is not appropriate, you will not be charged for this service.\"   The provider has received verbal consent for a Video Visit from the patient or first contact? Yes  Patient  or facility staff would like the video invitation sent by: N/A   Video Start Time: 8:15AM    [facility] Medical Record Number:  [id number]  Place of Location at the time of visit: [facility]  Chief Complaint   Patient presents with     RECHECK     HPI:  Waleska Marie  is a 75 year old (1945), who is being seen today for a visit.  HPI information obtained from: facility chart records, facility staff, patient report and [facility] chart review.     Waleska Marie is a 75 year old male with PMH significant for HTN, mantle cell lymphoma, COPD and atrial fibrillation. She was hospitalized at [facility] from 10/8/2020 to 10/21/2020 for acute renal failure, hyperkalemia, and metabolic acidosis. She underwent emergent HD. EVER multifactorial due to suspected decreased po intake, ACE-I and diuretic. PTA lisinopril, glargine, metformin, pioglitazone and KCl were discontinued. Furosemide was held during hospitalization and then resumed at a split BID dose. She also received 2 units PRBCs for hgb 5.8 and was started on iron supplement and PPI. Discharged to TCU for physical rehabilitation and medical management.     While at TCU was noted to have hematuria and treated for UTI- completed antibiotics 11/11. Also diagnosed with COVID 19 on 11/3.    Waleska was seen " "today for episodic follow up. Yesterday she was noted to have new SOB, cough, pallor, and recurrent hematuria. Xray negative for pneumonia. UA positive for UTI, culture pending. Waleska tells me she is \"feeling miserable today.\" She feels very SOB, but her breathing is not worse than yesterday. She continues to remain on RA and is not needing oxygen. Reports productive cough.Nursing reports she is using albuterol frequently and it is helpful. She is chilling, but does not have temperature. Reports poor appetite. Denies any edema to legs. She tells me she has hematuria today as well. VS reviewed       Past Medical and Surgical History reviewed in Epic today.  MEDICATIONS:    Current Outpatient Medications   Medication Sig Dispense Refill     acetaminophen (TYLENOL) 325 MG tablet Take 3 tablets (975 mg) by mouth 3 times daily       albuterol (PROAIR HFA/PROVENTIL HFA/VENTOLIN HFA) 108 (90 BASE) MCG/ACT Inhaler Inhale 2 puffs into the lungs every 4 hours as needed for shortness of breath / dyspnea or wheezing       apixaban ANTICOAGULANT (ELIQUIS) 5 MG tablet Take 5 mg by mouth 2 times daily       bisacodyl (DULCOLAX) 5 MG EC tablet Take 5 mg by mouth daily        cetirizine HCl (ZYRTEC ALLERGY) 10 MG CAPS Take 5 mg by mouth daily       digoxin (LANOXIN) 125 MCG tablet Take 125 mcg by mouth daily       ferrous sulfate (FEROSUL) 325 (65 Fe) MG tablet Take 1 tablet (325 mg) by mouth 2 times daily       fluticasone (FLOVENT HFA) 220 MCG/ACT Inhaler Inhale 2 puffs into the lungs 2 times daily        furosemide (LASIX) 40 MG tablet Take 1 tablet (40 mg) by mouth 2 times daily       GLUCOSAMINE-CHONDROITIN PO Take 2 capsules by mouth 2 times daily       guaiFENesin (MUCINEX) 600 MG 12 hr tablet Take 1 tablet (600 mg) by mouth 2 times daily       hydrOXYzine (ATARAX) 25 MG tablet Take 2 tablets (50 mg) by mouth every 4 hours as needed for itching 30 tablet 0     ibrutinib (IMBRUVICA) 140 MG capsule Take 560 mg by mouth daily   "      insulin aspart (NOVOLOG PEN) 100 UNIT/ML pen Inject Subcutaneous At Bedtime For Bedtime: 200 - 239 give 1 units; 240 - 289 give 2 units; 290 - 339 give 3 units; = or >340 give 4 units and contact provider.       insulin aspart (NOVOLOG PEN) 100 UNIT/ML pen Inject Subcutaneous 3 times daily (with meals) TID AC: 140 - 189 = 2 unit; 190 - 239 = 3 unit; 240 - 289 = 4 unit; 290 - 339 = 5 unit; 340+ = 6 unit = or >340 give 6 units contact provider       insulin glargine (LANTUS PEN) 100 UNIT/ML pen Inject 15 Units Subcutaneous At Bedtime       METHYL SALICYLATE EX Externally apply topically 2 times daily        metoprolol (LOPRESSOR) 50 MG tablet Take 25 mg by mouth 2 times daily       Multiple Vitamins-Minerals (MULTIVITAMIN ADULT PO) Take 1 tablet by mouth daily        omeprazole (PRILOSEC) 20 MG DR capsule Take 1 capsule (20 mg) by mouth daily       oxybutynin (DITROPAN) 5 MG tablet Take 5 mg by mouth 2 times daily       polyethylene glycol (MIRALAX) 17 g packet Take 17 g by mouth 2 times daily as needed for constipation Hold for loose stools.       potassium chloride ER (K-TAB) 20 MEQ CR tablet Take 20 mEq by mouth 2 times daily       senna-docusate (SENOKOT-S/PERICOLACE) 8.6-50 MG tablet Take 1 tablet by mouth 2 times daily as needed for constipation       simvastatin (ZOCOR) 10 MG tablet Take 10 mg by mouth At Bedtime       SOY ISOFLAVONE PO Take 198 mg by mouth daily       TiZANidine HCl (ZANAFLEX) 2 MG CAPS Take 2 mg by mouth 4 times daily as needed        vitamin C (ASCORBIC ACID) 500 MG tablet Take 1 tablet (500 mg) by mouth daily       ipratropium - albuterol 0.5 mg/2.5 mg/3 mL (DUONEB) 0.5-2.5 (3) MG/3ML neb solution Take 1 vial (3 mLs) by nebulization every 4 hours as needed for wheezing (Patient not taking: Reported on 10/28/2020)       REVIEW OF SYSTEMS: 4 point ROS including Respiratory, CV, GI and , other than that noted in the HPI,  is negative  Objective: /78   Pulse 60   Temp 97.4  F  "(36.3  C)   Resp 18   Ht 1.778 m (5' 10\")   Wt 126 kg (277 lb 12.8 oz)   SpO2 94%   BMI 39.86 kg/m    Limited visit exam done given COVID-19 precautions.   GENERAL APPEARANCE:  Alert, pleasant and cooperative, pale in color, resting in bed at time of exam  HEENT: normocephalic, moist mucous membranes, nose without drainage or crusting  RESP: is visibly short of breath at times during visit today, patient is on RA  PSYCH: affect and mood normal    Labs:   Labs done in SNF are in [place] EPIC. Please refer to them using [facility] Everywhere.    ASSESSMENT/PLAN:  (U07.1) COVID-19 virus detected  Comment: acute, increased SOB, cough- however respiratory status remains stable and she is on RA  Plan: Continue supportive cares including albuterol PRN, guaifenesin, tylenol. Isolation. Pulmonary toilet. VS q shift. Nursing to update provider with changes. Does have COPD as below- could consider adding short steroid course if no improvement and treating for COPD exacerbation    (N39.0,  R31.9) Urinary tract infection with hematuria, site unspecified  Comment: acute, culture is still pending, but patient overall feeling terrible- suspect UTI is contributing to this and will start antibiotics today  -hgb is stable from previous checks even with ongoing hematuria  -completed bactrim 11/11  Plan: Start Cefdinir 300 mg po BID x 5 days. First dose today. Update provider when sensitivities on urine culture are back. Continue to monitor for worsening hematuria. Hgb PRN.     (D50.0) Iron deficiency anemia due to chronic blood loss  Comment: acute, stable since hospital discharge, even in light of hematuria as noted above   -baseline hgb 8-9  Hemoglobin   Date Value Ref Range Status   11/19/2020 8.8 (L) 11.7 - 15.7 g/dL Final   11/18/2020 9.4 (L) 11.7 - 15.7 g/dL Final     Plan: Hgb PRN. Continue iron supplement, vitamin c. Monitor for ongoing bleeding.    (N18.30) Stage 3 chronic kidney disease, unspecified whether stage 3a or " 3b CKD  Comment: chronic, required HD during hospitalization  -baseline creatinine low 1s  -creatinine at last check at baseline  Plan: BMP PRN. Avoid nephrotoxins.    (J44.9) Chronic obstructive pulmonary disease, unspecified COPD type (H)  Comment: chronic, per history- noted to have increased SOB due to COVID  Plan: Continue Fluticasone and Albuterol as ordered. DuoNebs on hold while in TCU given COVID-19 pandemic. If no improvement in SOB could consider short steroid course for exacerbation.      Electronically signed by:  GASTON Muhammad CNP     Video-Visit Details  Type of service:  Video Visit  Video End Time (time video stopped): 8:23AM  Distant Location (provider location):  [facility]

## 2020-11-20 ENCOUNTER — NURSING HOME VISIT (OUTPATIENT)
Dept: GERIATRICS | Facility: CLINIC | Age: 75
End: 2020-11-20
Payer: MEDICARE

## 2020-11-20 VITALS
WEIGHT: 275.8 LBS | DIASTOLIC BLOOD PRESSURE: 69 MMHG | OXYGEN SATURATION: 95 % | HEIGHT: 70 IN | HEART RATE: 63 BPM | BODY MASS INDEX: 39.48 KG/M2 | RESPIRATION RATE: 18 BRPM | TEMPERATURE: 98 F | SYSTOLIC BLOOD PRESSURE: 144 MMHG

## 2020-11-20 DIAGNOSIS — R06.02 SOB (SHORTNESS OF BREATH): ICD-10-CM

## 2020-11-20 DIAGNOSIS — R31.9 URINARY TRACT INFECTION WITH HEMATURIA, SITE UNSPECIFIED: Primary | ICD-10-CM

## 2020-11-20 DIAGNOSIS — N39.0 URINARY TRACT INFECTION WITH HEMATURIA, SITE UNSPECIFIED: Primary | ICD-10-CM

## 2020-11-20 DIAGNOSIS — U07.1 COVID-19 VIRUS DETECTED: ICD-10-CM

## 2020-11-20 LAB
BACTERIA SPEC CULT: ABNORMAL
Lab: ABNORMAL
SPECIMEN SOURCE: ABNORMAL

## 2020-11-20 PROCEDURE — 99309 SBSQ NF CARE MODERATE MDM 30: CPT | Performed by: NURSE PRACTITIONER

## 2020-11-20 ASSESSMENT — MIFFLIN-ST. JEOR: SCORE: 1831.27

## 2020-11-20 NOTE — PROGRESS NOTES
"[facility]  [facility] Record Number:  [id number]  Place of Service where encounter took place:  [facility]  (S) [[id number]]  Chief Complaint   Patient presents with     Nursing Home Acute       HPI:    Waleska Marie  is a 75 year old (1945), who is being seen today for an episodic care visit.  HPI information obtained from: facility chart records, facility staff, patient report and [facility] chart review. Today's concern is:    Patient seen for episodic visit to recheck on SOB and UTI. NP yesterday started patient on empiric antibiotics for UTI, culture and sensitivities were pending. Started on Cefdinir    Today patient is found in bed. Alert, calm. Reports still does not feel good but is feeling a little better. States breathing is \"about the same\" States did eat today. VSS, afebrile.     Past Medical and Surgical History reviewed in Epic today.    MEDICATIONS:      Current Outpatient Medications   Medication Sig Dispense Refill     acetaminophen (TYLENOL) 325 MG tablet Take 3 tablets (975 mg) by mouth 3 times daily       albuterol (PROAIR HFA/PROVENTIL HFA/VENTOLIN HFA) 108 (90 BASE) MCG/ACT Inhaler Inhale 2 puffs into the lungs every 4 hours as needed for shortness of breath / dyspnea or wheezing       apixaban ANTICOAGULANT (ELIQUIS) 5 MG tablet Take 5 mg by mouth 2 times daily       bisacodyl (DULCOLAX) 5 MG EC tablet Take 5 mg by mouth daily        cetirizine HCl (ZYRTEC ALLERGY) 10 MG CAPS Take 5 mg by mouth daily       digoxin (LANOXIN) 125 MCG tablet Take 125 mcg by mouth daily       famotidine (PEPCID) 10 MG tablet Take 10 mg by mouth 2 times daily       ferrous sulfate (FEROSUL) 325 (65 Fe) MG tablet Take 1 tablet (325 mg) by mouth 2 times daily       fluticasone (FLOVENT HFA) 220 MCG/ACT Inhaler Inhale 2 puffs into the lungs 2 times daily        furosemide (LASIX) 40 MG tablet Take 1 tablet (40 mg) by mouth 2 times daily       GLUCOSAMINE-CHONDROITIN " PO Take 2 capsules by mouth 2 times daily       hydrOXYzine (ATARAX) 25 MG tablet Take 2 tablets (50 mg) by mouth every 4 hours as needed for itching 30 tablet 0     ibrutinib (IMBRUVICA) 140 MG capsule Take 560 mg by mouth daily        insulin aspart (NOVOLOG PEN) 100 UNIT/ML pen Inject Subcutaneous At Bedtime For Bedtime: 200 - 239 give 1 units; 240 - 289 give 2 units; 290 - 339 give 3 units; = or >340 give 4 units and contact provider.       insulin aspart (NOVOLOG PEN) 100 UNIT/ML pen Inject Subcutaneous 3 times daily (with meals) TID AC: 140 - 189 = 2 unit; 190 - 239 = 3 unit; 240 - 289 = 4 unit; 290 - 339 = 5 unit; 340+ = 6 unit = or >340 give 6 units contact provider       insulin glargine (LANTUS PEN) 100 UNIT/ML pen Inject 15 Units Subcutaneous At Bedtime       ipratropium - albuterol 0.5 mg/2.5 mg/3 mL (DUONEB) 0.5-2.5 (3) MG/3ML neb solution Take 1 vial (3 mLs) by nebulization every 4 hours as needed for wheezing (Patient not taking: Reported on 10/28/2020)       METHYL SALICYLATE EX Externally apply topically 2 times daily        metoprolol (LOPRESSOR) 50 MG tablet Take 25 mg by mouth 2 times daily       Multiple Vitamins-Minerals (MULTIVITAMIN ADULT PO) Take 1 tablet by mouth daily        omeprazole (PRILOSEC) 20 MG DR capsule Take 1 capsule (20 mg) by mouth daily       oxybutynin (DITROPAN) 5 MG tablet Take 5 mg by mouth 2 times daily       polyethylene glycol (MIRALAX) 17 g packet Take 17 g by mouth 2 times daily as needed for constipation Hold for loose stools.       potassium chloride ER (K-TAB) 20 MEQ CR tablet Take 20 mEq by mouth 2 times daily       senna-docusate (SENOKOT-S/PERICOLACE) 8.6-50 MG tablet Take 1 tablet by mouth 2 times daily as needed for constipation       simvastatin (ZOCOR) 10 MG tablet Take 10 mg by mouth At Bedtime       SOY ISOFLAVONE PO Take 198 mg by mouth daily       TiZANidine HCl (ZANAFLEX) 2 MG CAPS Take 2 mg by mouth 4 times daily as needed        vitamin C (ASCORBIC  "ACID) 500 MG tablet Take 1 tablet (500 mg) by mouth daily           REVIEW OF SYSTEMS:  4 point ROS including Respiratory, CV, GI and , other than that noted in the HPI,  is negative    Objective:  BP (!) 144/69   Pulse 63   Temp 98  F (36.7  C)   Resp 18   Ht 1.778 m (5' 10\")   Wt 125.1 kg (275 lb 12.8 oz)   SpO2 95%   BMI 39.57 kg/m    Exam:    Resp: Effort WNL, LSCTA  CV: VS as above, chronic LE edema  Abd- soft, nontender, BS +  Musc- DIXON  Psych- alert, calm, pleasant      Labs:   Recent labs in [facility] reviewed by me today.     ASSESSMENT/PLAN:  Urinary tract infection with hematuria, site unspecified  - 50,000 to 100,000 colonies/mL   Enterococcus faecalis   - UC and CBC revealed. Afebrile. Discussed with Dr. Wiggins, will change from Cefdinir to 7 day course of Amoxicillin. Discussed with patient and she is in agreement  - VS q shift  - follow clinically      COVID-19 virus detected  - dx on screen 11/3- had minimal s/s.   SOB (shortness of breath)  VSS- O2 sats stable on RA. LSC, CXR without acute pathology  - pulmonary toilet  - VS q shift  - follow closely clinically      Orders written by provider at facility        Electronically signed by:  GASTON Baxter CNP             "

## 2020-11-20 NOTE — LETTER
"    11/20/2020        RE: Waleska Marie  [address]  [place] [address]        [place] [facility]  [facility] Record Number:  [id number]  Place of Service where encounter took place:  [facility]  (S) [[id number]]  Chief Complaint   Patient presents with     Nursing Home Acute       HPI:    Waleska Marie  is a 75 year old (1945), who is being seen today for an episodic care visit.  HPI information obtained from: facility chart records, facility staff, patient report and [facility] chart review. Today's concern is:    Patient seen for episodic visit to recheck on SOB and UTI. NP yesterday started patient on empiric antibiotics for UTI, culture and sensitivities were pending. Started on Cefdinir    Today patient is found in bed. Alert, calm. Reports still does not feel good but is feeling a little better. States breathing is \"about the same\" States did eat today. VSS, afebrile.     Past Medical and Surgical History reviewed in Epic today.    MEDICATIONS:      Current Outpatient Medications   Medication Sig Dispense Refill     acetaminophen (TYLENOL) 325 MG tablet Take 3 tablets (975 mg) by mouth 3 times daily       albuterol (PROAIR HFA/PROVENTIL HFA/VENTOLIN HFA) 108 (90 BASE) MCG/ACT Inhaler Inhale 2 puffs into the lungs every 4 hours as needed for shortness of breath / dyspnea or wheezing       apixaban ANTICOAGULANT (ELIQUIS) 5 MG tablet Take 5 mg by mouth 2 times daily       bisacodyl (DULCOLAX) 5 MG EC tablet Take 5 mg by mouth daily        cetirizine HCl (ZYRTEC ALLERGY) 10 MG CAPS Take 5 mg by mouth daily       digoxin (LANOXIN) 125 MCG tablet Take 125 mcg by mouth daily       famotidine (PEPCID) 10 MG tablet Take 10 mg by mouth 2 times daily       ferrous sulfate (FEROSUL) 325 (65 Fe) MG tablet Take 1 tablet (325 mg) by mouth 2 times daily       fluticasone (FLOVENT HFA) 220 MCG/ACT Inhaler Inhale 2 puffs into the lungs 2 times daily        furosemide (LASIX) " 40 MG tablet Take 1 tablet (40 mg) by mouth 2 times daily       GLUCOSAMINE-CHONDROITIN PO Take 2 capsules by mouth 2 times daily       hydrOXYzine (ATARAX) 25 MG tablet Take 2 tablets (50 mg) by mouth every 4 hours as needed for itching 30 tablet 0     ibrutinib (IMBRUVICA) 140 MG capsule Take 560 mg by mouth daily        insulin aspart (NOVOLOG PEN) 100 UNIT/ML pen Inject Subcutaneous At Bedtime For Bedtime: 200 - 239 give 1 units; 240 - 289 give 2 units; 290 - 339 give 3 units; = or >340 give 4 units and contact provider.       insulin aspart (NOVOLOG PEN) 100 UNIT/ML pen Inject Subcutaneous 3 times daily (with meals) TID AC: 140 - 189 = 2 unit; 190 - 239 = 3 unit; 240 - 289 = 4 unit; 290 - 339 = 5 unit; 340+ = 6 unit = or >340 give 6 units contact provider       insulin glargine (LANTUS PEN) 100 UNIT/ML pen Inject 15 Units Subcutaneous At Bedtime       ipratropium - albuterol 0.5 mg/2.5 mg/3 mL (DUONEB) 0.5-2.5 (3) MG/3ML neb solution Take 1 vial (3 mLs) by nebulization every 4 hours as needed for wheezing (Patient not taking: Reported on 10/28/2020)       METHYL SALICYLATE EX Externally apply topically 2 times daily        metoprolol (LOPRESSOR) 50 MG tablet Take 25 mg by mouth 2 times daily       Multiple Vitamins-Minerals (MULTIVITAMIN ADULT PO) Take 1 tablet by mouth daily        omeprazole (PRILOSEC) 20 MG DR capsule Take 1 capsule (20 mg) by mouth daily       oxybutynin (DITROPAN) 5 MG tablet Take 5 mg by mouth 2 times daily       polyethylene glycol (MIRALAX) 17 g packet Take 17 g by mouth 2 times daily as needed for constipation Hold for loose stools.       potassium chloride ER (K-TAB) 20 MEQ CR tablet Take 20 mEq by mouth 2 times daily       senna-docusate (SENOKOT-S/PERICOLACE) 8.6-50 MG tablet Take 1 tablet by mouth 2 times daily as needed for constipation       simvastatin (ZOCOR) 10 MG tablet Take 10 mg by mouth At Bedtime       SOY ISOFLAVONE PO Take 198 mg by mouth daily       TiZANidine HCl  "(ZANAFLEX) 2 MG CAPS Take 2 mg by mouth 4 times daily as needed        vitamin C (ASCORBIC ACID) 500 MG tablet Take 1 tablet (500 mg) by mouth daily           REVIEW OF SYSTEMS:  4 point ROS including Respiratory, CV, GI and , other than that noted in the HPI,  is negative    Objective:  BP (!) 144/69   Pulse 63   Temp 98  F (36.7  C)   Resp 18   Ht 1.778 m (5' 10\")   Wt 125.1 kg (275 lb 12.8 oz)   SpO2 95%   BMI 39.57 kg/m    Exam:    Resp: Effort WNL, LSCTA  CV: VS as above, chronic LE edema  Abd- soft, nontender, BS +  Musc- DIXON  Psych- alert, calm, pleasant      Labs:   Recent labs in [facility] reviewed by me today.     ASSESSMENT/PLAN:  Urinary tract infection with hematuria, site unspecified  - 50,000 to 100,000 colonies/mL   Enterococcus faecalis   - UC and CBC revealed. Afebrile. Discussed with Dr. Wiggins, will change from Cefdinir to 7 day course of Amoxicillin. Discussed with patient and she is in agreement  - VS q shift  - follow clinically      COVID-19 virus detected  - dx on screen 11/3- had minimal s/s.   SOB (shortness of breath)  VSS- O2 sats stable on RA. LSC, CXR without acute pathology  - pulmonary toilet  - VS q shift  - follow closely clinically      Orders written by provider at facility        Electronically signed by:  GASTON Baxter CNP                   Sincerely,        GASTON Baxter CNP    "

## 2020-11-21 ENCOUNTER — TELEPHONE (OUTPATIENT)
Dept: GERIATRICS | Facility: CLINIC | Age: 75
End: 2020-11-21

## 2020-11-21 NOTE — TELEPHONE ENCOUNTER
Staff called to report that metoprolol and digoxin held this AM.  Due to low pulse.      Continue giving medications as it is.  No new orders    Electronically signed by Jennifer Davidson RN, CNP

## 2020-11-22 RX ORDER — GUAIFENESIN 600 MG/1
600 TABLET, EXTENDED RELEASE ORAL 2 TIMES DAILY
Start: 2020-11-22

## 2020-11-25 ENCOUNTER — NURSING HOME VISIT (OUTPATIENT)
Dept: GERIATRICS | Facility: CLINIC | Age: 75
End: 2020-11-25
Payer: MEDICARE

## 2020-11-25 VITALS
HEIGHT: 70 IN | RESPIRATION RATE: 18 BRPM | DIASTOLIC BLOOD PRESSURE: 68 MMHG | SYSTOLIC BLOOD PRESSURE: 120 MMHG | TEMPERATURE: 97.8 F | WEIGHT: 276 LBS | HEART RATE: 70 BPM | BODY MASS INDEX: 39.51 KG/M2 | OXYGEN SATURATION: 95 %

## 2020-11-25 DIAGNOSIS — N18.30 STAGE 3 CHRONIC KIDNEY DISEASE, UNSPECIFIED WHETHER STAGE 3A OR 3B CKD (H): ICD-10-CM

## 2020-11-25 DIAGNOSIS — D64.9 ACUTE ON CHRONIC ANEMIA: ICD-10-CM

## 2020-11-25 DIAGNOSIS — J44.9 CHRONIC OBSTRUCTIVE PULMONARY DISEASE, UNSPECIFIED COPD TYPE (H): ICD-10-CM

## 2020-11-25 DIAGNOSIS — U07.1 COVID-19 VIRUS DETECTED: Primary | ICD-10-CM

## 2020-11-25 DIAGNOSIS — N30.01 ACUTE CYSTITIS WITH HEMATURIA: ICD-10-CM

## 2020-11-25 PROCEDURE — 99309 SBSQ NF CARE MODERATE MDM 30: CPT | Performed by: NURSE PRACTITIONER

## 2020-11-25 ASSESSMENT — MIFFLIN-ST. JEOR: SCORE: 1832.18

## 2020-11-25 NOTE — LETTER
11/25/2020        RE: Waleska Marie  [address]  [place] [address]        [facility]  [place] Medical Record Number:  [id number]  Place of Service where encounter took place:  [facility]  (S) [[id number]]  Chief Complaint   Patient presents with     Nursing Home Acute       HPI:    Waleska Marie  is a 75 year old (1945), who is being seen today for an episodic care visit.  HPI information obtained from: facility chart records, facility staff, patient report and [facility] chart review. Today's concern is:     COVID-19 virus detected  Acute cystitis with hematuria  Acute on chronic anemia  Stage 3 chronic kidney disease, unspecified whether stage 3a or 3b CKD  Chronic obstructive pulmonary disease, unspecified COPD type (H)    Patient seen today for admission visit to TCU. Patient notes she is feeling better since changing antibiotics, denies urinary complaints today. She has no acute concerns today. Therapies are going slowly, but well per her repoty. Appetite is decreased. She is sleeping well. Denies CP, palpitations, fatigue, nausea, vomiting, increased SOB/NAM, fever, chills, and/or b/b concerns today.    Past Medical and Surgical History reviewed in Epic today.    MEDICATIONS:  Current Outpatient Medications   Medication Sig Dispense Refill     acetaminophen (TYLENOL) 325 MG tablet Take 3 tablets (975 mg) by mouth 3 times daily       albuterol (PROAIR HFA/PROVENTIL HFA/VENTOLIN HFA) 108 (90 BASE) MCG/ACT Inhaler Inhale 2 puffs into the lungs every 4 hours as needed for shortness of breath / dyspnea or wheezing       apixaban ANTICOAGULANT (ELIQUIS) 5 MG tablet Take 5 mg by mouth 2 times daily       bisacodyl (DULCOLAX) 5 MG EC tablet Take 5 mg by mouth daily        cetirizine HCl (ZYRTEC ALLERGY) 10 MG CAPS Take 5 mg by mouth daily       digoxin (LANOXIN) 125 MCG tablet Take 125 mcg by mouth daily       ferrous sulfate (FEROSUL) 325 (65 Fe) MG tablet  Take 1 tablet (325 mg) by mouth 2 times daily       fluticasone (FLOVENT HFA) 220 MCG/ACT Inhaler Inhale 2 puffs into the lungs 2 times daily        furosemide (LASIX) 40 MG tablet Take 1 tablet (40 mg) by mouth 2 times daily       GLUCOSAMINE-CHONDROITIN PO Take 2 capsules by mouth 2 times daily       guaiFENesin (MUCINEX) 600 MG 12 hr tablet Take 1 tablet (600 mg) by mouth 2 times daily       hydrOXYzine (ATARAX) 25 MG tablet Take 2 tablets (50 mg) by mouth every 4 hours as needed for itching 30 tablet 0     ibrutinib (IMBRUVICA) 140 MG capsule Take 560 mg by mouth daily        insulin aspart (NOVOLOG PEN) 100 UNIT/ML pen Inject Subcutaneous At Bedtime For Bedtime: 200 - 239 give 1 units; 240 - 289 give 2 units; 290 - 339 give 3 units; = or >340 give 4 units and contact provider.       insulin aspart (NOVOLOG PEN) 100 UNIT/ML pen Inject Subcutaneous 3 times daily (with meals) TID AC: 140 - 189 = 2 unit; 190 - 239 = 3 unit; 240 - 289 = 4 unit; 290 - 339 = 5 unit; 340+ = 6 unit = or >340 give 6 units contact provider       insulin glargine (LANTUS PEN) 100 UNIT/ML pen Inject 15 Units Subcutaneous At Bedtime       ipratropium - albuterol 0.5 mg/2.5 mg/3 mL (DUONEB) 0.5-2.5 (3) MG/3ML neb solution Take 1 vial (3 mLs) by nebulization every 4 hours as needed for wheezing (Patient not taking: Reported on 10/28/2020)       METHYL SALICYLATE EX Externally apply topically 2 times daily        metoprolol (LOPRESSOR) 50 MG tablet Take 25 mg by mouth 2 times daily       Multiple Vitamins-Minerals (MULTIVITAMIN ADULT PO) Take 1 tablet by mouth daily        omeprazole (PRILOSEC) 20 MG DR capsule Take 1 capsule (20 mg) by mouth daily       oxybutynin (DITROPAN) 5 MG tablet Take 5 mg by mouth 2 times daily       polyethylene glycol (MIRALAX) 17 g packet Take 17 g by mouth 2 times daily as needed for constipation Hold for loose stools.       potassium chloride ER (K-TAB) 20 MEQ CR tablet Take 20 mEq by mouth 2 times daily        "senna-docusate (SENOKOT-S/PERICOLACE) 8.6-50 MG tablet Take 1 tablet by mouth 2 times daily as needed for constipation       simvastatin (ZOCOR) 10 MG tablet Take 10 mg by mouth At Bedtime       SOY ISOFLAVONE PO Take 198 mg by mouth daily       TiZANidine HCl (ZANAFLEX) 2 MG CAPS Take 2 mg by mouth 4 times daily as needed        vitamin C (ASCORBIC ACID) 500 MG tablet Take 1 tablet (500 mg) by mouth daily           REVIEW OF SYSTEMS:  10 point ROS of systems including Constitutional, Eyes, Respiratory, Cardiovascular, Gastroenterology, Genitourinary, Integumentary, Musculoskeletal, Psychiatric were all negative except for pertinent positives noted in my HPI.    Objective:  /68   Pulse 70   Temp 97.8  F (36.6  C)   Resp 18   Ht 1.778 m (5' 10\")   Wt 125.2 kg (276 lb)   SpO2 95%   BMI 39.60 kg/m    Exam:  GENERAL APPEARANCE:  Alert, in no distress, appears healthy, oriented, cooperative, elderly woman upright in bed  EYES:  EOM, conjunctivae, lids, pupils and irises normal  RESP:  respiratory effort and palpation of chest normal, no respiratory distress, diminished breath sounds throughout, cough - occasional & nonproductive, NAM  CV:  Palpation and auscultation of heart done , regular rate and rhythm, no murmur, rub, or gallop  ABDOMEN:  normal bowel sounds, soft, nontender, no hepatosplenomegaly or other masses  M/S:   Gait and station abnormal - GAVIN 2/2 patient being in bed  Digits and nails abnormal - arthritic changes present  PSYCH:  oriented X 3, normal insight, judgement and memory, affect and mood normal    Labs:   Recent labs in [facility] reviewed by me today.     ASSESSMENT/PLAN:   (U07.1) COVID-19 virus detected  Comment: acute, SOB, occasional cough- respiratory status remains stable and she is on RA  Plan: Continue supportive cares including albuterol PRN, guaifenesin, tylenol. Isolation. Pulmonary toilet. VS q shift. Nursing to update provider with changes. Does have COPD as below- could " consider adding short steroid course if no improvement and treating for COPD exacerbation    (N39.0,  R31.9) Urinary tract infection with hematuria, site unspecified  Comment: acute, patient does note feeling improvement in sx with change of antibiotics  -hgb is stable from previous checks even with ongoing hematuria  -completed bactrim 11/11  -Changed from Cefdinir to Augmentin on 11/20 following culture results  Plan: Improving per pt report; Continue Augmentin as ordered - Continue to monitor for worsening hematuria. Hgb PRN.     (D50.0) Iron deficiency anemia due to chronic blood loss  Comment: acute, stable since hospital discharge, even in light of hematuria as noted above   -baseline hgb 8-9  Hemoglobin   Date Value Ref Range Status   11/19/2020 8.8 (L) 11.7 - 15.7 g/dL Final   11/18/2020 9.4 (L) 11.7 - 15.7 g/dL Final   Plan: Hgb PRN. Continue iron supplement, vitamin c. Monitor for ongoing bleeding.    (N18.30) Stage 3 chronic kidney disease, unspecified whether stage 3a or 3b CKD  Comment: chronic, required HD during hospitalization  -baseline creatinine low 1s  -creatinine at last check at baseline  Plan: BMP PRN. Avoid nephrotoxins.    (J44.9) Chronic obstructive pulmonary disease, unspecified COPD type (H)  Comment: chronic, per history- noted to have increased SOB due to COVID  Plan: Continue Fluticasone and Albuterol as ordered. DuoNebs on hold while in TCU given COVID-19 pandemic. If no improvement in SOB could consider short steroid course for exacerbation as noted above.    Total time spent with patient visit at the skilled nursing facility was 25 min including patient visit and review of past records. Greater than 50% of total time spent with counseling and coordinating care due to acute concerns; medical complexity; and ongoing care planning.    Electronically signed by:  Dr. Ashtyn Headley, GASTON, ANNEMARIE  [facility]  [place] Office: [address]  [address] [place] [address]   [place] Cell: [phone]  [place] Fax: [phone]    [facility] Offce: [address] [place] [address]  [facility] Phone: [phone]  [facility] Voicemail: [phone]    Email: [email or web address]     ~Be the beauty you wish to see in the world~                   Sincerely,        GASTON Quiroz CNP

## 2020-11-25 NOTE — PROGRESS NOTES
[facility]  [place] [facility] Record Number:  [id number]  Place of Service where encounter took place:  [facility]  (S) [[id number]]  Chief Complaint   Patient presents with     Nursing Home Acute       HPI:    Waleska Marie  is a 75 year old (1945), who is being seen today for an episodic care visit.  HPI information obtained from: facility chart records, facility staff, patient report and [facility] chart review. Today's concern is:     COVID-19 virus detected  Acute cystitis with hematuria  Acute on chronic anemia  Stage 3 chronic kidney disease, unspecified whether stage 3a or 3b CKD  Chronic obstructive pulmonary disease, unspecified COPD type (H)    Patient seen today for admission visit to TCU. Patient notes she is feeling better since changing antibiotics, denies urinary complaints today. She has no acute concerns today. Therapies are going slowly, but well per her repoty. Appetite is decreased. She is sleeping well. Denies CP, palpitations, fatigue, nausea, vomiting, increased SOB/NAM, fever, chills, and/or b/b concerns today.    Past Medical and Surgical History reviewed in Epic today.    MEDICATIONS:  Current Outpatient Medications   Medication Sig Dispense Refill     acetaminophen (TYLENOL) 325 MG tablet Take 3 tablets (975 mg) by mouth 3 times daily       albuterol (PROAIR HFA/PROVENTIL HFA/VENTOLIN HFA) 108 (90 BASE) MCG/ACT Inhaler Inhale 2 puffs into the lungs every 4 hours as needed for shortness of breath / dyspnea or wheezing       apixaban ANTICOAGULANT (ELIQUIS) 5 MG tablet Take 5 mg by mouth 2 times daily       bisacodyl (DULCOLAX) 5 MG EC tablet Take 5 mg by mouth daily        cetirizine HCl (ZYRTEC ALLERGY) 10 MG CAPS Take 5 mg by mouth daily       digoxin (LANOXIN) 125 MCG tablet Take 125 mcg by mouth daily       ferrous sulfate (FEROSUL) 325 (65 Fe) MG tablet Take 1 tablet (325 mg) by mouth 2 times daily       fluticasone (FLOVENT HFA) 220 MCG/ACT  Inhaler Inhale 2 puffs into the lungs 2 times daily        furosemide (LASIX) 40 MG tablet Take 1 tablet (40 mg) by mouth 2 times daily       GLUCOSAMINE-CHONDROITIN PO Take 2 capsules by mouth 2 times daily       guaiFENesin (MUCINEX) 600 MG 12 hr tablet Take 1 tablet (600 mg) by mouth 2 times daily       hydrOXYzine (ATARAX) 25 MG tablet Take 2 tablets (50 mg) by mouth every 4 hours as needed for itching 30 tablet 0     ibrutinib (IMBRUVICA) 140 MG capsule Take 560 mg by mouth daily        insulin aspart (NOVOLOG PEN) 100 UNIT/ML pen Inject Subcutaneous At Bedtime For Bedtime: 200 - 239 give 1 units; 240 - 289 give 2 units; 290 - 339 give 3 units; = or >340 give 4 units and contact provider.       insulin aspart (NOVOLOG PEN) 100 UNIT/ML pen Inject Subcutaneous 3 times daily (with meals) TID AC: 140 - 189 = 2 unit; 190 - 239 = 3 unit; 240 - 289 = 4 unit; 290 - 339 = 5 unit; 340+ = 6 unit = or >340 give 6 units contact provider       insulin glargine (LANTUS PEN) 100 UNIT/ML pen Inject 15 Units Subcutaneous At Bedtime       ipratropium - albuterol 0.5 mg/2.5 mg/3 mL (DUONEB) 0.5-2.5 (3) MG/3ML neb solution Take 1 vial (3 mLs) by nebulization every 4 hours as needed for wheezing (Patient not taking: Reported on 10/28/2020)       METHYL SALICYLATE EX Externally apply topically 2 times daily        metoprolol (LOPRESSOR) 50 MG tablet Take 25 mg by mouth 2 times daily       Multiple Vitamins-Minerals (MULTIVITAMIN ADULT PO) Take 1 tablet by mouth daily        omeprazole (PRILOSEC) 20 MG DR capsule Take 1 capsule (20 mg) by mouth daily       oxybutynin (DITROPAN) 5 MG tablet Take 5 mg by mouth 2 times daily       polyethylene glycol (MIRALAX) 17 g packet Take 17 g by mouth 2 times daily as needed for constipation Hold for loose stools.       potassium chloride ER (K-TAB) 20 MEQ CR tablet Take 20 mEq by mouth 2 times daily       senna-docusate (SENOKOT-S/PERICOLACE) 8.6-50 MG tablet Take 1 tablet by mouth 2 times daily  "as needed for constipation       simvastatin (ZOCOR) 10 MG tablet Take 10 mg by mouth At Bedtime       SOY ISOFLAVONE PO Take 198 mg by mouth daily       TiZANidine HCl (ZANAFLEX) 2 MG CAPS Take 2 mg by mouth 4 times daily as needed        vitamin C (ASCORBIC ACID) 500 MG tablet Take 1 tablet (500 mg) by mouth daily           REVIEW OF SYSTEMS:  10 point ROS of systems including Constitutional, Eyes, Respiratory, Cardiovascular, Gastroenterology, Genitourinary, Integumentary, Musculoskeletal, Psychiatric were all negative except for pertinent positives noted in my HPI.    Objective:  /68   Pulse 70   Temp 97.8  F (36.6  C)   Resp 18   Ht 1.778 m (5' 10\")   Wt 125.2 kg (276 lb)   SpO2 95%   BMI 39.60 kg/m    Exam:  GENERAL APPEARANCE:  Alert, in no distress, appears healthy, oriented, cooperative, elderly woman upright in bed  EYES:  EOM, conjunctivae, lids, pupils and irises normal  RESP:  respiratory effort and palpation of chest normal, no respiratory distress, diminished breath sounds throughout, cough - occasional & nonproductive, NAM  CV:  Palpation and auscultation of heart done , regular rate and rhythm, no murmur, rub, or gallop  ABDOMEN:  normal bowel sounds, soft, nontender, no hepatosplenomegaly or other masses  M/S:   Gait and station abnormal - GAVIN 2/2 patient being in bed  Digits and nails abnormal - arthritic changes present  PSYCH:  oriented X 3, normal insight, judgement and memory, affect and mood normal    Labs:   Recent labs in [facility] reviewed by me today.     ASSESSMENT/PLAN:   (U07.1) COVID-19 virus detected  Comment: acute, SOB, occasional cough- respiratory status remains stable and she is on RA  Plan: Continue supportive cares including albuterol PRN, guaifenesin, tylenol. Isolation. Pulmonary toilet. VS q shift. Nursing to update provider with changes. Does have COPD as below- could consider adding short steroid course if no improvement and treating for COPD " exacerbation    (N39.0,  R31.9) Urinary tract infection with hematuria, site unspecified  Comment: acute, patient does note feeling improvement in sx with change of antibiotics  -hgb is stable from previous checks even with ongoing hematuria  -completed bactrim 11/11  -Changed from Cefdinir to Augmentin on 11/20 following culture results  Plan: Improving per pt report; Continue Augmentin as ordered - Continue to monitor for worsening hematuria. Hgb PRN.     (D50.0) Iron deficiency anemia due to chronic blood loss  Comment: acute, stable since hospital discharge, even in light of hematuria as noted above   -baseline hgb 8-9  Hemoglobin   Date Value Ref Range Status   11/19/2020 8.8 (L) 11.7 - 15.7 g/dL Final   11/18/2020 9.4 (L) 11.7 - 15.7 g/dL Final   Plan: Hgb PRN. Continue iron supplement, vitamin c. Monitor for ongoing bleeding.    (N18.30) Stage 3 chronic kidney disease, unspecified whether stage 3a or 3b CKD  Comment: chronic, required HD during hospitalization  -baseline creatinine low 1s  -creatinine at last check at baseline  Plan: BMP PRN. Avoid nephrotoxins.    (J44.9) Chronic obstructive pulmonary disease, unspecified COPD type (H)  Comment: chronic, per history- noted to have increased SOB due to COVID  Plan: Continue Fluticasone and Albuterol as ordered. DuoNebs on hold while in TCU given COVID-19 pandemic. If no improvement in SOB could consider short steroid course for exacerbation as noted above.    Total time spent with patient visit at the skilled nursing facility was 25 min including patient visit and review of past records. Greater than 50% of total time spent with counseling and coordinating care due to acute concerns; medical complexity; and ongoing care planning.    Electronically signed by:  Dr. Ashtyn Headley, APRN, ANNEMARIE  [facility]  [place] Office: [address] [place] [address]   [place] Cell: [phone]  [place] Fax:  [phone]    [facility] Offce: [address] [place] [address]  [facility] Phone: [phone]  [facility] Voicemail: [phone]    Email: [email or web address]     ~Be the beauty you wish to see in the world~

## 2020-12-02 ENCOUNTER — NURSING HOME VISIT (OUTPATIENT)
Dept: GERIATRICS | Facility: CLINIC | Age: 75
End: 2020-12-02
Payer: MEDICARE

## 2020-12-02 ENCOUNTER — APPOINTMENT (OUTPATIENT)
Dept: CT IMAGING | Facility: CLINIC | Age: 75
End: 2020-12-02
Attending: PHYSICIAN ASSISTANT
Payer: MEDICARE

## 2020-12-02 ENCOUNTER — HOSPITAL ENCOUNTER (EMERGENCY)
Facility: CLINIC | Age: 75
Discharge: HOME | End: 2020-12-02
Attending: PHYSICIAN ASSISTANT | Admitting: PHYSICIAN ASSISTANT
Payer: MEDICARE

## 2020-12-02 VITALS
TEMPERATURE: 97.6 F | RESPIRATION RATE: 16 BRPM | DIASTOLIC BLOOD PRESSURE: 55 MMHG | OXYGEN SATURATION: 98 % | WEIGHT: 281 LBS | BODY MASS INDEX: 40.23 KG/M2 | SYSTOLIC BLOOD PRESSURE: 135 MMHG | HEIGHT: 70 IN | HEART RATE: 52 BPM

## 2020-12-02 VITALS
RESPIRATION RATE: 18 BRPM | BODY MASS INDEX: 40.37 KG/M2 | HEIGHT: 70 IN | OXYGEN SATURATION: 98 % | DIASTOLIC BLOOD PRESSURE: 88 MMHG | SYSTOLIC BLOOD PRESSURE: 165 MMHG | WEIGHT: 282 LBS | HEART RATE: 61 BPM | TEMPERATURE: 97.6 F

## 2020-12-02 DIAGNOSIS — N30.01 ACUTE CYSTITIS WITH HEMATURIA: ICD-10-CM

## 2020-12-02 DIAGNOSIS — R59.9 ADENOPATHY: ICD-10-CM

## 2020-12-02 DIAGNOSIS — N39.0 URINARY TRACT INFECTION WITH HEMATURIA, SITE UNSPECIFIED: ICD-10-CM

## 2020-12-02 DIAGNOSIS — U07.1 COVID-19 VIRUS DETECTED: ICD-10-CM

## 2020-12-02 DIAGNOSIS — R31.0 GROSS HEMATURIA: Primary | ICD-10-CM

## 2020-12-02 DIAGNOSIS — R31.0 GROSS HEMATURIA: ICD-10-CM

## 2020-12-02 DIAGNOSIS — R31.9 URINARY TRACT INFECTION WITH HEMATURIA, SITE UNSPECIFIED: ICD-10-CM

## 2020-12-02 DIAGNOSIS — K86.2 PANCREATIC CYST: ICD-10-CM

## 2020-12-02 LAB
ALBUMIN UR-MCNC: 100 MG/DL
ANION GAP SERPL CALCULATED.3IONS-SCNC: 6 MMOL/L (ref 3–14)
APPEARANCE UR: ABNORMAL
BASOPHILS # BLD AUTO: 0.1 10E9/L (ref 0–0.2)
BASOPHILS NFR BLD AUTO: 0.8 %
BILIRUB UR QL STRIP: NEGATIVE
BUN SERPL-MCNC: 26 MG/DL (ref 7–30)
CALCIUM SERPL-MCNC: 8.9 MG/DL (ref 8.5–10.1)
CHLORIDE SERPL-SCNC: 101 MMOL/L (ref 94–109)
CO2 SERPL-SCNC: 30 MMOL/L (ref 20–32)
COLOR UR AUTO: ABNORMAL
CREAT SERPL-MCNC: 0.97 MG/DL (ref 0.52–1.04)
DIFFERENTIAL METHOD BLD: ABNORMAL
EOSINOPHIL # BLD AUTO: 0 10E9/L (ref 0–0.7)
EOSINOPHIL NFR BLD AUTO: 0.4 %
ERYTHROCYTE [DISTWIDTH] IN BLOOD BY AUTOMATED COUNT: 18.6 % (ref 10–15)
GFR SERPL CREATININE-BSD FRML MDRD: 58 ML/MIN/{1.73_M2}
GLUCOSE SERPL-MCNC: 152 MG/DL (ref 70–99)
GLUCOSE UR STRIP-MCNC: NEGATIVE MG/DL
HCT VFR BLD AUTO: 31.7 % (ref 35–47)
HGB BLD-MCNC: 9.5 G/DL (ref 11.7–15.7)
HGB UR QL STRIP: ABNORMAL
IMM GRANULOCYTES # BLD: 0 10E9/L (ref 0–0.4)
IMM GRANULOCYTES NFR BLD: 0.4 %
KETONES UR STRIP-MCNC: NEGATIVE MG/DL
LEUKOCYTE ESTERASE UR QL STRIP: ABNORMAL
LYMPHOCYTES # BLD AUTO: 1.4 10E9/L (ref 0.8–5.3)
LYMPHOCYTES NFR BLD AUTO: 17.9 %
MCH RBC QN AUTO: 29.8 PG (ref 26.5–33)
MCHC RBC AUTO-ENTMCNC: 30 G/DL (ref 31.5–36.5)
MCV RBC AUTO: 99 FL (ref 78–100)
MONOCYTES # BLD AUTO: 0.7 10E9/L (ref 0–1.3)
MONOCYTES NFR BLD AUTO: 9.5 %
NEUTROPHILS # BLD AUTO: 5.6 10E9/L (ref 1.6–8.3)
NEUTROPHILS NFR BLD AUTO: 71 %
NITRATE UR QL: NEGATIVE
NRBC # BLD AUTO: 0 10*3/UL
NRBC BLD AUTO-RTO: 0 /100
PH UR STRIP: 6.5 PH (ref 5–7)
PLATELET # BLD AUTO: 224 10E9/L (ref 150–450)
POTASSIUM SERPL-SCNC: 3.8 MMOL/L (ref 3.4–5.3)
RBC # BLD AUTO: 3.19 10E12/L (ref 3.8–5.2)
RBC #/AREA URNS AUTO: >182 /HPF (ref 0–2)
SODIUM SERPL-SCNC: 137 MMOL/L (ref 133–144)
SOURCE: ABNORMAL
SP GR UR STRIP: 1.01 (ref 1–1.03)
UROBILINOGEN UR STRIP-MCNC: NORMAL MG/DL (ref 0–2)
WBC # BLD AUTO: 7.8 10E9/L (ref 4–11)
WBC #/AREA URNS AUTO: >182 /HPF (ref 0–5)
WBC CLUMPS #/AREA URNS HPF: PRESENT /HPF
YEAST #/AREA URNS HPF: ABNORMAL /HPF

## 2020-12-02 PROCEDURE — 36415 COLL VENOUS BLD VENIPUNCTURE: CPT | Performed by: PHYSICIAN ASSISTANT

## 2020-12-02 PROCEDURE — 81001 URINALYSIS AUTO W/SCOPE: CPT | Performed by: PHYSICIAN ASSISTANT

## 2020-12-02 PROCEDURE — 87086 URINE CULTURE/COLONY COUNT: CPT | Performed by: PHYSICIAN ASSISTANT

## 2020-12-02 PROCEDURE — 87088 URINE BACTERIA CULTURE: CPT | Performed by: PHYSICIAN ASSISTANT

## 2020-12-02 PROCEDURE — 99309 SBSQ NF CARE MODERATE MDM 30: CPT | Performed by: NURSE PRACTITIONER

## 2020-12-02 PROCEDURE — 85025 COMPLETE CBC W/AUTO DIFF WBC: CPT | Performed by: PHYSICIAN ASSISTANT

## 2020-12-02 PROCEDURE — 87186 SC STD MICRODIL/AGAR DIL: CPT | Performed by: PHYSICIAN ASSISTANT

## 2020-12-02 PROCEDURE — 80048 BASIC METABOLIC PNL TOTAL CA: CPT | Performed by: PHYSICIAN ASSISTANT

## 2020-12-02 PROCEDURE — 74176 CT ABD & PELVIS W/O CONTRAST: CPT

## 2020-12-02 PROCEDURE — 99284 EMERGENCY DEPT VISIT MOD MDM: CPT | Mod: 25

## 2020-12-02 RX ORDER — CEPHALEXIN 500 MG/1
500 CAPSULE ORAL 2 TIMES DAILY
Qty: 14 CAPSULE | Refills: 0 | Status: SHIPPED | OUTPATIENT
Start: 2020-12-02 | End: 2020-12-08

## 2020-12-02 ASSESSMENT — ENCOUNTER SYMPTOMS
HEMATURIA: 1
BACK PAIN: 0
FEVER: 0
ABDOMINAL PAIN: 0

## 2020-12-02 ASSESSMENT — MIFFLIN-ST. JEOR
SCORE: 1854.86
SCORE: 1859.39

## 2020-12-02 NOTE — DISCHARGE INSTRUCTIONS
*Follow-up with primary care provider in 2 days for recheck.  Discussed incidental findings on CT including iliac chain adenopathy and pancreatic cyst.  Also discussed possible referral to urology if symptoms persist.  *Keflex as prescribed.   *Return to the emergency department for fever, abdominal pain, back pain, vomiting, or any other new/concerning symptoms.    Discharge Instructions  Urinary Tract Infection  You or your child have been diagnosed with a urinary tract infection, or UTI. The urinary tract includes the kidneys (which make urine/pee), ureters (the tubes that carry urine/pee from the kidneys to the bladder), the bladder (which stores urine/pee), and urethra (the tube that carries urine/pee out of the bladder). Urinary tract infections occur when bacteria travel up the urethra into the bladder (bladder infection) and, in some cases, from there into the kidneys (kidney infection).  Generally, every Emergency Department visit should have a follow-up clinic visit with either a primary or a specialty clinic/provider. Please follow-up as instructed by your emergency provider today.  Return to the Emergency Department if:  You or your child have severe back pain.  You or your child are vomiting (throwing up) so that you cannot take your medicine.  You or your child have a new fever (had not previously had a fever) over 101 F.  You or your child have confusion or are very weak, or feel very ill.  Your child seems much more ill, will not wake up, will not respond right, or is crying for a long time and will not calm down.  You or your child are showing signs of dehydration. These signs may include decreased urination (pee), dry mouth/gums/tongue, or decreased activity.    Follow-up with your provider:   Children under 24 months need to be seen by their regular provider within one week after a diagnosis of a UTI. It may be necessary to do some more tests to look at the child s kidney or bladder.  You should  begin to feel better within 24 - 48 hours of starting your antibiotic; follow-up with your regular clinic/doctor/provider if this is not the case.    Treatment:   You will be treated with an antibiotic to kill the bacteria. We have to make an educated guess, based on what we know about common bacteria and antibiotics, as to which antibiotic will work for your infection. We will be correct most times but there will be some cases where the antibiotic chosen is not correct (see urine cultures below).  Take a pain medication such as acetaminophen (Tylenol ) or ibuprofen (Advil , Motrin , Nuprin ).  Phenazopyridine (Pyridium , Uristat ) is a prescription medication that numbs the bladder to reduce the burning pain of some UTIs.  The same medication is available in a non-prescription version (Azo-Standard , Urodol ). This medication will change the color of the urine and tears (usually blue or orange). If you wear contacts, do not wear them while taking this medication as they may be stained by the medication.    Urine Cultures:  If indicated, a urine culture may have been performed today. This test generally takes 24-48 hours to complete so the results are not known at this time. The results can confirm that an infection is present but also determine which antibiotic is effective for the specific bacteria that is causing the infection. If your urine culture shows that the antibiotic you were given today will not work to treat your infection, we will attempt to contact you to make arrangements to change the antibiotic. If the culture confirms that the antibiotic is effective for your infection, you will not be contacted. We often recommend follow-up with your regular physician/provider on the culture results regardless of this process.    Antibiotic Warning:   If you have been placed on antibiotics - watch for signs of allergic reaction.  These include rash, lip swelling, difficulty breathing, wheezing, and dizziness.  If  "you develop any of these symptoms, stop the antibiotic immediately and go to an emergency room or urgent care for evaluation.    Probiotics: If you have been given an antibiotic, you may want to also take a probiotic pill or eat yogurt with live cultures. Probiotics have \"good bacteria\" to help your intestines stay healthy. Studies have shown that probiotics help prevent diarrhea and other intestine problems (including C. diff infection) when you take antibiotics. You can buy these without a prescription in the pharmacy section of the store.   If you were given a prescription for medicine here today, be sure to read all of the information (including the package insert) that comes with your prescription.  This will include important information about the medicine, its side effects, and any warnings that you need to know about.  The pharmacist who fills the prescription can provide more information and answer questions you may have about the medicine.  If you have questions or concerns that the pharmacist cannot address, please call or return to the Emergency Department.   Remember that you can always come back to the Emergency Department if you are not able to see your regular provider in the amount of time listed above, if you get any new symptoms, or if there is anything that worries you.   "

## 2020-12-02 NOTE — LETTER
"    12/2/2020        RE: Waleska Marie  [address]  [place] [address]        [place] [facility]  [place] [facility] Record Number:  [id number]  Place of Service where encounter took place:  [facility]  ([facility]) [[id number]]  Chief Complaint   Patient presents with     RECHECK       HPI:    Waleska Marie  is a 75 year old (1945), who is being seen today for an episodic care visit.  HPI information obtained from: facility chart records, facility staff, patient report and [facility] chart review. Today's concern is:      Patient in TCU for acute rehab and medical management after hospitalization for acute renal failure and acute anemia. Required hemodialysis for a time inpatient and did have pure wick. Also had HGB 5.8 and required 2 unit(s) PRBC- iron added inpatient and PPI. Of note is on Apixaban for chronic atrial fibrillation    In TCU patient has had recurrent mild episodes of hematuria. Has been treated for UTIs x 2 in TCU both times with resolution of hematuria    11/3 > 100k ecoli- treated with 5 day course of Bactrim- done 11/11 11/18  50 - 100K enterococcus faecalis- treated with 7 day course of Amoxicillin done 11/27    Hgb has remained stable in TCU    Of note also + Covid while in TCU 11/3 screening. Later developed some increased SOB and malaise, ? If r/t UTI or COVID. No other s/s and did not require supplemental O2    Today nursing summoned writer to report noting 2 soaked briefs of \"bloody\" urine and ? \"petechia\" on bilateral LE/hip area yesterday.     Upon provider visit with patient later a.m patient found to have a soaked brief with large amount of gross hematuria.Patient reports some pain with urination starting today. Reports \"stomach ache\" on and off for several days. Denies n/v. Denies fever or chills. Reports breathing baseline.     Past Medical and Surgical History reviewed in Epic today.    MEDICATIONS:      Current Outpatient Medications "   Medication Sig Dispense Refill     acetaminophen (TYLENOL) 325 MG tablet Take 3 tablets (975 mg) by mouth 3 times daily       albuterol (PROAIR HFA/PROVENTIL HFA/VENTOLIN HFA) 108 (90 BASE) MCG/ACT Inhaler Inhale 2 puffs into the lungs every 4 hours as needed for shortness of breath / dyspnea or wheezing       apixaban ANTICOAGULANT (ELIQUIS) 5 MG tablet Take 5 mg by mouth 2 times daily       bisacodyl (DULCOLAX) 5 MG EC tablet Take 5 mg by mouth daily        cetirizine HCl (ZYRTEC ALLERGY) 10 MG CAPS Take 5 mg by mouth daily       digoxin (LANOXIN) 125 MCG tablet Take 125 mcg by mouth daily       ferrous sulfate (FEROSUL) 325 (65 Fe) MG tablet Take 1 tablet (325 mg) by mouth 2 times daily       fluticasone (FLOVENT HFA) 220 MCG/ACT Inhaler Inhale 2 puffs into the lungs 2 times daily        furosemide (LASIX) 40 MG tablet Take 1 tablet (40 mg) by mouth 2 times daily       GLUCOSAMINE-CHONDROITIN PO Take 2 capsules by mouth 2 times daily       guaiFENesin (MUCINEX) 600 MG 12 hr tablet Take 1 tablet (600 mg) by mouth 2 times daily       hydrOXYzine (ATARAX) 25 MG tablet Take 2 tablets (50 mg) by mouth every 4 hours as needed for itching 30 tablet 0     ibrutinib (IMBRUVICA) 140 MG capsule Take 560 mg by mouth daily        insulin aspart (NOVOLOG PEN) 100 UNIT/ML pen Inject Subcutaneous At Bedtime For Bedtime: 200 - 239 give 1 units; 240 - 289 give 2 units; 290 - 339 give 3 units; = or >340 give 4 units and contact provider.       insulin aspart (NOVOLOG PEN) 100 UNIT/ML pen Inject Subcutaneous 3 times daily (with meals) TID AC: 140 - 189 = 2 unit; 190 - 239 = 3 unit; 240 - 289 = 4 unit; 290 - 339 = 5 unit; 340+ = 6 unit = or >340 give 6 units contact provider       insulin glargine (LANTUS PEN) 100 UNIT/ML pen Inject 15 Units Subcutaneous At Bedtime       ipratropium - albuterol 0.5 mg/2.5 mg/3 mL (DUONEB) 0.5-2.5 (3) MG/3ML neb solution Take 1 vial (3 mLs) by nebulization every 4 hours as needed for wheezing  "(Patient not taking: Reported on 10/28/2020)       METHYL SALICYLATE EX Externally apply topically 2 times daily        metoprolol (LOPRESSOR) 50 MG tablet Take 25 mg by mouth 2 times daily       Multiple Vitamins-Minerals (MULTIVITAMIN ADULT PO) Take 1 tablet by mouth daily        omeprazole (PRILOSEC) 20 MG DR capsule Take 1 capsule (20 mg) by mouth daily       oxybutynin (DITROPAN) 5 MG tablet Take 5 mg by mouth 2 times daily       polyethylene glycol (MIRALAX) 17 g packet Take 17 g by mouth 2 times daily as needed for constipation Hold for loose stools.       potassium chloride ER (K-TAB) 20 MEQ CR tablet Take 20 mEq by mouth 2 times daily       senna-docusate (SENOKOT-S/PERICOLACE) 8.6-50 MG tablet Take 1 tablet by mouth 2 times daily as needed for constipation       simvastatin (ZOCOR) 10 MG tablet Take 10 mg by mouth At Bedtime       SOY ISOFLAVONE PO Take 198 mg by mouth daily       TiZANidine HCl (ZANAFLEX) 2 MG CAPS Take 2 mg by mouth 4 times daily as needed        vitamin C (ASCORBIC ACID) 500 MG tablet Take 1 tablet (500 mg) by mouth daily           REVIEW OF SYSTEMS:  4 point ROS including Respiratory, CV, GI and , other than that noted in the HPI,  is negative    Objective:  BP (!) 165/88   Pulse 61   Temp 97.6  F (36.4  C)   Resp 18   Ht 1.778 m (5' 10\")   Wt 127.9 kg (282 lb)   SpO2 98%   BMI 40.46 kg/m    Exam:    Resp: Effort WNL- RA  CV: VS as above- dependent LE edema, some in hip area bilaterally as well  Abd- soft, nontender, BS +  - large amounts of gross hematuria  Musc- DIXON- general weakness  Skin- No petechia noted - chronic discoloration to bilateral LE  Psych- alert, calm, pleasant      Labs:     Most Recent 3 CBC's:  Recent Labs   Lab Test 11/19/20  0900 11/18/20  0800 11/09/20  0900 11/05/20  1140   WBC 7.2  --  7.8 5.1   HGB 8.8* 9.4* 7.9* 7.8*   MCV 96  --  98 95     --  323 365     Most Recent 3 BMP's:  Recent Labs   Lab Test 11/18/20  0800 11/09/20  0900 " 10/29/20  1605    135 138   POTASSIUM 3.9 4.1 3.7   CHLORIDE 104 102 102   CO2 26 25 27   BUN 26 21 22   CR 0.98 1.10* 0.98   ANIONGAP 7 8 9   LEE 8.1* 8.1* 7.8*   * 211* 215*       ASSESSMENT/PLAN:  Gross hematuria  - recurrent and worsening, 3 large soaked briefs this morning. ? Etiology- has been treated for 2 UTIs as above and is done with antibiotics. I on Apixaban for chronic atrial fibrillation.    SEND to ED at [facility] for evaluation. Discussed with patient who agrees with this plan.    Urinary tract infection with hematuria, site unspecified  X 2 in TCU- completed two courses of antibiotics. Afebrile, WBC WNL. Recurrent hematuria as above          COVID-19 virus detected  - dx 11/3- is off transmission based precautions      Orders written by provider at facility      Electronically signed by:  GASTON Baxter CNP                   Sincerely,        GASTON Baxter CNP

## 2020-12-02 NOTE — ED NOTES
Bed: ED37  Expected date: 12/2/20  Expected time: 12:17 PM  Means of arrival: Ambulance  Comments:  BV1

## 2020-12-02 NOTE — ED TRIAGE NOTES
75 year old female presents via EMS for hematuria. Patient has noticed blood in her urine x1 week that is worse today. ABCs intact. GCS 15.

## 2020-12-02 NOTE — PROGRESS NOTES
"[place] [facility]  [place] Medical Record Number:  [id number]  Place of Service where encounter took place:  [facility]  (S) [[id number]]  Chief Complaint   Patient presents with     RECHECK       HPI:    Waleska Marie  is a 75 year old (1945), who is being seen today for an episodic care visit.  HPI information obtained from: facility chart records, facility staff, patient report and [facility] chart review. Today's concern is:      Patient in TCU for acute rehab and medical management after hospitalization for acute renal failure and acute anemia. Required hemodialysis for a time inpatient and did have pure wick. Also had HGB 5.8 and required 2 unit(s) PRBC- iron added inpatient and PPI. Of note is on Apixaban for chronic atrial fibrillation    In TCU patient has had recurrent mild episodes of hematuria. Has been treated for UTIs x 2 in TCU both times with resolution of hematuria    11/3 > 100k ecoli- treated with 5 day course of Bactrim- done 11/11 11/18  50 - 100K enterococcus faecalis- treated with 7 day course of Amoxicillin done 11/27    Hgb has remained stable in TCU    Of note also + Covid while in TCU 11/3 screening. Later developed some increased SOB and malaise, ? If r/t UTI or COVID. No other s/s and did not require supplemental O2    Today nursing summoned writer to report noting 2 soaked briefs of \"bloody\" urine and ? \"petechia\" on bilateral LE/hip area yesterday.     Upon provider visit with patient later a.m patient found to have a soaked brief with large amount of gross hematuria.Patient reports some pain with urination starting today. Reports \"stomach ache\" on and off for several days. Denies n/v. Denies fever or chills. Reports breathing baseline.     Past Medical and Surgical History reviewed in Epic today.    MEDICATIONS:      Current Outpatient Medications   Medication Sig Dispense Refill     acetaminophen (TYLENOL) 325 MG tablet Take 3 tablets (975 mg) " by mouth 3 times daily       albuterol (PROAIR HFA/PROVENTIL HFA/VENTOLIN HFA) 108 (90 BASE) MCG/ACT Inhaler Inhale 2 puffs into the lungs every 4 hours as needed for shortness of breath / dyspnea or wheezing       apixaban ANTICOAGULANT (ELIQUIS) 5 MG tablet Take 5 mg by mouth 2 times daily       bisacodyl (DULCOLAX) 5 MG EC tablet Take 5 mg by mouth daily        cetirizine HCl (ZYRTEC ALLERGY) 10 MG CAPS Take 5 mg by mouth daily       digoxin (LANOXIN) 125 MCG tablet Take 125 mcg by mouth daily       ferrous sulfate (FEROSUL) 325 (65 Fe) MG tablet Take 1 tablet (325 mg) by mouth 2 times daily       fluticasone (FLOVENT HFA) 220 MCG/ACT Inhaler Inhale 2 puffs into the lungs 2 times daily        furosemide (LASIX) 40 MG tablet Take 1 tablet (40 mg) by mouth 2 times daily       GLUCOSAMINE-CHONDROITIN PO Take 2 capsules by mouth 2 times daily       guaiFENesin (MUCINEX) 600 MG 12 hr tablet Take 1 tablet (600 mg) by mouth 2 times daily       hydrOXYzine (ATARAX) 25 MG tablet Take 2 tablets (50 mg) by mouth every 4 hours as needed for itching 30 tablet 0     ibrutinib (IMBRUVICA) 140 MG capsule Take 560 mg by mouth daily        insulin aspart (NOVOLOG PEN) 100 UNIT/ML pen Inject Subcutaneous At Bedtime For Bedtime: 200 - 239 give 1 units; 240 - 289 give 2 units; 290 - 339 give 3 units; = or >340 give 4 units and contact provider.       insulin aspart (NOVOLOG PEN) 100 UNIT/ML pen Inject Subcutaneous 3 times daily (with meals) TID AC: 140 - 189 = 2 unit; 190 - 239 = 3 unit; 240 - 289 = 4 unit; 290 - 339 = 5 unit; 340+ = 6 unit = or >340 give 6 units contact provider       insulin glargine (LANTUS PEN) 100 UNIT/ML pen Inject 15 Units Subcutaneous At Bedtime       ipratropium - albuterol 0.5 mg/2.5 mg/3 mL (DUONEB) 0.5-2.5 (3) MG/3ML neb solution Take 1 vial (3 mLs) by nebulization every 4 hours as needed for wheezing (Patient not taking: Reported on 10/28/2020)       METHYL SALICYLATE EX Externally apply topically 2  "times daily        metoprolol (LOPRESSOR) 50 MG tablet Take 25 mg by mouth 2 times daily       Multiple Vitamins-Minerals (MULTIVITAMIN ADULT PO) Take 1 tablet by mouth daily        omeprazole (PRILOSEC) 20 MG DR capsule Take 1 capsule (20 mg) by mouth daily       oxybutynin (DITROPAN) 5 MG tablet Take 5 mg by mouth 2 times daily       polyethylene glycol (MIRALAX) 17 g packet Take 17 g by mouth 2 times daily as needed for constipation Hold for loose stools.       potassium chloride ER (K-TAB) 20 MEQ CR tablet Take 20 mEq by mouth 2 times daily       senna-docusate (SENOKOT-S/PERICOLACE) 8.6-50 MG tablet Take 1 tablet by mouth 2 times daily as needed for constipation       simvastatin (ZOCOR) 10 MG tablet Take 10 mg by mouth At Bedtime       SOY ISOFLAVONE PO Take 198 mg by mouth daily       TiZANidine HCl (ZANAFLEX) 2 MG CAPS Take 2 mg by mouth 4 times daily as needed        vitamin C (ASCORBIC ACID) 500 MG tablet Take 1 tablet (500 mg) by mouth daily           REVIEW OF SYSTEMS:  4 point ROS including Respiratory, CV, GI and , other than that noted in the HPI,  is negative    Objective:  BP (!) 165/88   Pulse 61   Temp 97.6  F (36.4  C)   Resp 18   Ht 1.778 m (5' 10\")   Wt 127.9 kg (282 lb)   SpO2 98%   BMI 40.46 kg/m    Exam:    Resp: Effort WNL- RA  CV: VS as above- dependent LE edema, some in hip area bilaterally as well  Abd- soft, nontender, BS +  - large amounts of gross hematuria  Musc- DIXON- general weakness  Skin- No petechia noted - chronic discoloration to bilateral LE  Psych- alert, calm, pleasant      Labs:     Most Recent 3 CBC's:  Recent Labs   Lab Test 11/19/20  0900 11/18/20  0800 11/09/20  0900 11/05/20  1140   WBC 7.2  --  7.8 5.1   HGB 8.8* 9.4* 7.9* 7.8*   MCV 96  --  98 95     --  323 365     Most Recent 3 BMP's:  Recent Labs   Lab Test 11/18/20  0800 11/09/20  0900 10/29/20  1605    135 138   POTASSIUM 3.9 4.1 3.7   CHLORIDE 104 102 102   CO2 26 25 27   BUN 26 21 22 "   CR 0.98 1.10* 0.98   ANIONGAP 7 8 9   LEE 8.1* 8.1* 7.8*   * 211* 215*       ASSESSMENT/PLAN:  Gross hematuria  - recurrent and worsening, 3 large soaked briefs this morning. ? Etiology- has been treated for 2 UTIs as above and is done with antibiotics. I on Apixaban for chronic atrial fibrillation.    SEND to ED at [facility] for evaluation. Discussed with patient who agrees with this plan.    Urinary tract infection with hematuria, site unspecified  X 2 in TCU- completed two courses of antibiotics. Afebrile, WBC WNL. Recurrent hematuria as above          COVID-19 virus detected  - dx 11/3- is off transmission based precautions      Orders written by provider at facility      Electronically signed by:  GASTON Baxter CNP

## 2020-12-02 NOTE — ED AVS SNAPSHOT
[facility] Emergency Dept  [address]  [facility] [address]  Phone: [phone]  Fax: [phone]                                    Waleska Marie   MRN: [id number]    Department: [facility] Emergency Dept   Date of Visit: 12/2/2020           After Visit Summary Signature Page    I have received my discharge instructions, and my questions have been answered. I have discussed any challenges I see with this plan with the nurse or doctor.    ..........................................................................................................................................  Patient/Patient Representative Signature      ..........................................................................................................................................  Patient Representative Print Name and Relationship to Patient    ..................................................               ................................................  Date                                   Time    ..........................................................................................................................................  Reviewed by Signature/Title    ...................................................              ..............................................  Date                                               Time          22EPIC Rev 08/18

## 2020-12-02 NOTE — ED PROVIDER NOTES
History     Chief Complaint:  Hematuria     HPI   Waleska Marie is a 75 year old female with history of atrial fibrillation on Eliquis, type 2 diabetes, chronic kidney disease, overactive bladder, and cancer who presents from [facility] via EMS for evaluation of hematuria for the past week. The patient states for the past week she has noticed hematuria in her Depends brief after urinating. She was recently treated with Bactrim for a UTI, which was not successful and was then prescribed amoxicillin. The staff at her facility attributed her hematuria to the UTI.  She is not currently taking an antibiotic. Today she has increased amount of blood in her urine, prompting them to call EMS for evaluation.    Here, the patient states for the past 2 days, she has had a headache and she does not usually get headaches. She denies any fever, back pain, abdominal pain, or other symptoms prompting her presentation.     After speaking to Lorri, a caregiver at her facility, she stated she had not had bleeding for 3-4 weeks and she is not currently on an antibiotic.     Allergies:  Azithromycin  Ciprofloxacin  Morphine  Nitrofurantoin    Medications:   Albuterol inhaler  Eliquis  Dulcolax  Lanoxin  Ferosul  Flovent inhaler  Lasix  Atarax  Ibrutinib  Novolog  Lantus  Duoneb inhaler  Lopressor  Prilosec  Ditropan   Senokot  Zocor  Zanaflex   Metformin    Past Medical History:    Obesity   Mantle cell lymphoma  Atrial fibrillation  Anemia  Chronic kidney disease  Type 2 diabetes  Hyperlipidemia  Overactive bladder  COPD   GERD  Osteoarthritis  Chronic congestive heart failure  Hypertension  Lymphedema  Cellulitis    Past Surgical History:    CVC tunnel removal right  Colonoscopy x 2  EGD   Appendectomy   Cholecystectomy   Total abdominal hysterectomy with bilateral salpingo-oophorectomy    Family History:    Father - Cancer  Brother - Asthma  Sister - Asthma     Social History:  The patient was accompanied to the ED by  "EMS.  Smoking Status: Former Smoker, 1972  Smokeless Tobacco: Never Used  Alcohol Use: No  Drug Use: No  PCP: LALITA MORALES   Marital Status:      Review of Systems   Constitutional: Negative for fever.   Gastrointestinal: Negative for abdominal pain.   Genitourinary: Positive for hematuria.   Musculoskeletal: Negative for back pain.   All other systems reviewed and are negative.    Physical Exam     Patient Vitals for the past 24 hrs:   BP Temp Temp src Pulse Resp SpO2 Height Weight   12/02/20 1300 133/58 -- -- 52 -- 100 % -- --   12/02/20 1245 -- -- -- -- -- 100 % -- --   12/02/20 1230 129/60 97.6  F (36.4  C) Oral 74 16 100 % 1.778 m (5' 10\") 127.5 kg (281 lb)     Physical Exam  General: Alert, interactive.  Head:  Scalp is atraumatic.  Eyes:  EOM intact. The pupils are equal, round, and reactive to light. No scleral icterus.   ENT:                                      Ears:  The external ears are normal.  Nose:  The external nose is normal.  Throat:  The oropharynx is normal. Mucus membranes are moist.                 Neck:  Normal range of motion. There is no rigidity.   CV:  Regular rate and rhythm. No murmur. 2+ radial pulses  Resp:  Breath sounds are clear bilaterally. Non-labored, no retractions or accessory muscle use.  GI:  Abdomen is soft, no distension, no tenderness. No CVA tenderness.   MS:  Normal range of motion.   Skin:  Warm and dry.   Neuro:  Strength and sensation grossly intact.   Psych:  Awake. Alert.  Appropriate interactions.     Emergency Department Course     Imaging:  Radiology findings were communicated with the patient who voiced understanding of the findings.    Abd/pelvis CT no contrast - [place] Protocol  IMPRESSION:   1.  Mild diffuse bladder wall thickening may be related to cystitis.  No other cause for hematuria is identified. No urinary calculi are  seen.  2.  Mild external iliac chain adenopathy bilaterally. Neoplasm such as  lymphoma or metastatic disease cannot be " excluded.  3.  Colonic diverticulosis, without convincing evidence for diverticulitis.  4.  A probable cystic lesion in the pancreatic tail measures 2.4 cm,  and is not well characterized on this exam. Recommend pancreatic MRI  for further characterization.  Reading per radiology.      Laboratory:  Laboratory findings were communicated with the patient who voiced understanding of the findings.    CBC: HGB 9.5 (L) o/w WNL (WBC 7.8, )  BMP: Glucose 152 (H), GFR 58 (L) o/w WNL (Creatinine 0.97)     UA reflex to Microscopic and Culture: Blood Large (A), Protein albumin 100 (A), Leukocyte esterase Large (A), RBC/HPF >182 (H), WBC/HPF >182 (H), WBC Clumps Present (A), Yeast Many (A) o/w WNL  Urine Culture: [place]     Emergency Department Course:  Past medical records, nursing notes, and vitals reviewed.  The patient was sent for a Abd/pelvis CT no contrast - [place] Protocol while in the emergency department, results above.    IV was inserted and blood was drawn for laboratory testing, results above.   The patient provided a urine sample here in the emergency department. This was sent for laboratory testing, findings above.     (1228)   I performed an exam of the patient as documented above. History obtained from patient.     (1447)   I spoke with Lorri, caregiver at [facility], regarding Waleska's presentation, findings, and plan of care.     (8874)   I rechecked the patient and discussed results and plan of care.     Findings and plan explained to the Patient. Patient discharged home with instructions regarding supportive care, medications, and reasons to return. The importance of close follow-up was reviewed. The patient was prescribed Keflex. I personally reviewed the laboratory and imaging results with the Patient and answered all related questions prior to discharge.     Impression & Plan     Medical Decision Making:  Waleska Marie is a 75 year old female with medical history including atrial fibrillation on  Eliquis, type 2 diabetes, chronic kidney disease presented to the ED today for evaluation of hematuria. Her symptoms are consistent with a urinary tract infection.  Urinalysis confirms the infection.  There has been no fever, back/flank pain or significant abdominal pain.  There is no clinical evidence of pyelonephritis, appendicitis,  or diverticulitis.  CT without contrast shows mild diffuse bladder wall thickening could be related to cystitis.  Also noted mild external iliac chain adenopathy bilaterally concerning for possible neoplasm.  I discussed this finding along with the probable pancreatic cyst with the patient and recommended close follow-up with her primary care provider to discuss these further.     The patient will be started on Keflex for the infection. Urine culture pending. She was told to return if increasing pain, vomiting, fever, or inability to tolerate the oral antibiotic. She will follow up with her primary physician if not improving in 2-3 days. All questions were answered prior to the patient's discharge. She was in agreement with the plan stated above.       Diagnosis:    ICD-10-CM    1. Acute cystitis with hematuria  N30.01    2. Adenopathy  R59.1     iliac chain   3. Pancreatic cyst  K86.2    4. Gross hematuria  R31.0      Disposition:  Discharged to home.    Discharge Medications:  New Prescriptions    CEPHALEXIN (KEFLEX) 500 MG CAPSULE    Take 1 capsule (500 mg) by mouth 2 times daily for 7 days       Scribe Disclosure:  IDavid, am serving as a scribe at 12:28 PM on 12/2/2020 to document services personally performed by Mickie Singleton PA-C based on my observations and the provider's statements to me.  December 2, 2020   [facility] EMERGENCY DEPT        Mickie Singleton PA-C  12/02/20 1736

## 2020-12-03 VITALS
HEART RATE: 64 BPM | WEIGHT: 282 LBS | HEIGHT: 70 IN | BODY MASS INDEX: 40.37 KG/M2 | SYSTOLIC BLOOD PRESSURE: 144 MMHG | RESPIRATION RATE: 16 BRPM | OXYGEN SATURATION: 96 % | TEMPERATURE: 98.6 F | DIASTOLIC BLOOD PRESSURE: 80 MMHG

## 2020-12-03 LAB
BACTERIA SPEC CULT: ABNORMAL
SPECIMEN SOURCE: ABNORMAL

## 2020-12-03 ASSESSMENT — MIFFLIN-ST. JEOR: SCORE: 1859.39

## 2020-12-03 NOTE — PROGRESS NOTES
[facility]  [place] [facility] Record Number:  [id number]  Place of Service where encounter took place:  [facility]  ([facility]) [[id number]]  Chief Complaint   Patient presents with     RECHECK       HPI:    Waleska Marie  is a 75 year old (1945), who is being seen today for an episodic care visit.  HPI information obtained from: facility chart records, facility staff, patient report and [facility] chart review. Today's concern is:    Seen for UTI recheck  Completing antibiotic course, hematuria lessening. VSS        Past Medical and Surgical History reviewed in Epic today.    MEDICATIONS:      Current Outpatient Medications   Medication Sig Dispense Refill     acetaminophen (TYLENOL) 325 MG tablet Take 3 tablets (975 mg) by mouth 3 times daily       albuterol (PROAIR HFA/PROVENTIL HFA/VENTOLIN HFA) 108 (90 BASE) MCG/ACT Inhaler Inhale 2 puffs into the lungs every 4 hours as needed for shortness of breath / dyspnea or wheezing       apixaban ANTICOAGULANT (ELIQUIS) 5 MG tablet Take 5 mg by mouth 2 times daily       bisacodyl (DULCOLAX) 5 MG EC tablet Take 5 mg by mouth daily        cephALEXin (KEFLEX) 500 MG capsule Take 1 capsule (500 mg) by mouth 2 times daily for 7 days 14 capsule 0     cetirizine HCl (ZYRTEC ALLERGY) 10 MG CAPS Take 5 mg by mouth daily       digoxin (LANOXIN) 125 MCG tablet Take 125 mcg by mouth daily       ferrous sulfate (FEROSUL) 325 (65 Fe) MG tablet Take 1 tablet (325 mg) by mouth 2 times daily       fluticasone (FLOVENT HFA) 220 MCG/ACT Inhaler Inhale 2 puffs into the lungs 2 times daily        furosemide (LASIX) 40 MG tablet Take 1 tablet (40 mg) by mouth 2 times daily       GLUCOSAMINE-CHONDROITIN PO Take 2 capsules by mouth 2 times daily       guaiFENesin (MUCINEX) 600 MG 12 hr tablet Take 1 tablet (600 mg) by mouth 2 times daily       hydrOXYzine (ATARAX) 25 MG tablet Take 2 tablets (50 mg) by mouth every 4 hours as needed for itching 30 tablet 0      ibrutinib (IMBRUVICA) 140 MG capsule Take 560 mg by mouth daily        insulin aspart (NOVOLOG PEN) 100 UNIT/ML pen Inject Subcutaneous At Bedtime For Bedtime: 200 - 239 give 1 units; 240 - 289 give 2 units; 290 - 339 give 3 units; = or >340 give 4 units and contact provider.       insulin aspart (NOVOLOG PEN) 100 UNIT/ML pen Inject Subcutaneous 3 times daily (with meals) TID AC: 140 - 189 = 2 unit; 190 - 239 = 3 unit; 240 - 289 = 4 unit; 290 - 339 = 5 unit; 340+ = 6 unit = or >340 give 6 units contact provider       insulin glargine (LANTUS PEN) 100 UNIT/ML pen Inject 15 Units Subcutaneous At Bedtime       ipratropium - albuterol 0.5 mg/2.5 mg/3 mL (DUONEB) 0.5-2.5 (3) MG/3ML neb solution Take 1 vial (3 mLs) by nebulization every 4 hours as needed for wheezing (Patient not taking: Reported on 10/28/2020)       METHYL SALICYLATE EX Externally apply topically 2 times daily        metoprolol (LOPRESSOR) 50 MG tablet Take 25 mg by mouth 2 times daily       Multiple Vitamins-Minerals (MULTIVITAMIN ADULT PO) Take 1 tablet by mouth daily        omeprazole (PRILOSEC) 20 MG DR capsule Take 1 capsule (20 mg) by mouth daily       oxybutynin (DITROPAN) 5 MG tablet Take 5 mg by mouth 2 times daily       polyethylene glycol (MIRALAX) 17 g packet Take 17 g by mouth 2 times daily as needed for constipation Hold for loose stools.       potassium chloride ER (K-TAB) 20 MEQ CR tablet Take 20 mEq by mouth 2 times daily       senna-docusate (SENOKOT-S/PERICOLACE) 8.6-50 MG tablet Take 1 tablet by mouth 2 times daily as needed for constipation       simvastatin (ZOCOR) 10 MG tablet Take 10 mg by mouth At Bedtime       SOY ISOFLAVONE PO Take 198 mg by mouth daily       TiZANidine HCl (ZANAFLEX) 2 MG CAPS Take 2 mg by mouth 4 times daily as needed        vitamin C (ASCORBIC ACID) 500 MG tablet Take 1 tablet (500 mg) by mouth daily           REVIEW OF SYSTEMS:  4 point ROS including Respiratory, CV, GI and , other than that noted in the  "HPI,  is negative    Objective:  BP (!) 144/80   Pulse 64   Temp 98.6  F (37  C)   Resp 16   Ht 1.778 m (5' 10\")   Wt 127.9 kg (282 lb)   SpO2 96%   BMI 40.46 kg/m    Exam:  Resp: Effort WNL, LSCTA   CV: VSS, chronic edema  Abd- soft, nontender, BS +  Musc- DIXON  Psych- alert, calm, pleasant      Labs:   Recent labs in [facility] reviewed by me today.     ASSESSMENT/PLAN:  Urinary tract infection with hematuria, site unspecified  Gross hematuria  - recurrent UTI, recurrent hematuria in TCU. - No active urinary s/s. HGB stable. Afebrile, WBC WNL  - complete Keflex  - monitor for resolution of hematuria  - f/w PCP and urology when return to [facility] after discharge.         Electronically signed by:  GASTON Baxter CNP             "

## 2020-12-03 NOTE — RESULT ENCOUNTER NOTE
Emergency Dept/Urgent Care discharge antibiotic (if prescribed): Cephalexin (Keflex) 500 mg capsule, 1 capsule (500 mg) by mouth 2 times daily for 7 days.  Date of Rx (if applicable):  12/2/20  No changes in treatment per Urine culture protocol.

## 2020-12-04 ENCOUNTER — NURSING HOME VISIT (OUTPATIENT)
Dept: GERIATRICS | Facility: CLINIC | Age: 75
End: 2020-12-04
Payer: MEDICARE

## 2020-12-04 DIAGNOSIS — N39.0 URINARY TRACT INFECTION WITH HEMATURIA, SITE UNSPECIFIED: Primary | ICD-10-CM

## 2020-12-04 DIAGNOSIS — R31.9 URINARY TRACT INFECTION WITH HEMATURIA, SITE UNSPECIFIED: Primary | ICD-10-CM

## 2020-12-04 DIAGNOSIS — R31.0 GROSS HEMATURIA: ICD-10-CM

## 2020-12-04 PROCEDURE — 99309 SBSQ NF CARE MODERATE MDM 30: CPT | Performed by: NURSE PRACTITIONER

## 2020-12-04 NOTE — PROGRESS NOTES
Patient: Waleska Marie    : 1945      ORDERS:    CBC, BMP  (UTI, hematuria)    Please set up patient with next available Urology appointment at [facility] for recurrent UTI and hematuria. Please coordinate transportation.     GASTON Baxter CNP on 2020 at 1:46 PM

## 2020-12-04 NOTE — LETTER
12/4/2020        RE: Waleska Marie  [address]  [place] [address]        [facility]  [facility] Record Number:  [id number]  Place of Service where encounter took place:  [facility]  (Pending sale to [facility]) [[id number]]  Chief Complaint   Patient presents with     RECHECK       HPI:    Waleska Marie  is a 75 year old (1945), who is being seen today for an episodic care visit.  HPI information obtained from: facility chart records, facility staff, patient report and [facility] chart review. Today's concern is:    Seen for UTI recheck  Completing antibiotic course, hematuria lessening. VSS        Past Medical and Surgical History reviewed in Epic today.    MEDICATIONS:      Current Outpatient Medications   Medication Sig Dispense Refill     acetaminophen (TYLENOL) 325 MG tablet Take 3 tablets (975 mg) by mouth 3 times daily       albuterol (PROAIR HFA/PROVENTIL HFA/VENTOLIN HFA) 108 (90 BASE) MCG/ACT Inhaler Inhale 2 puffs into the lungs every 4 hours as needed for shortness of breath / dyspnea or wheezing       apixaban ANTICOAGULANT (ELIQUIS) 5 MG tablet Take 5 mg by mouth 2 times daily       bisacodyl (DULCOLAX) 5 MG EC tablet Take 5 mg by mouth daily        cephALEXin (KEFLEX) 500 MG capsule Take 1 capsule (500 mg) by mouth 2 times daily for 7 days 14 capsule 0     cetirizine HCl (ZYRTEC ALLERGY) 10 MG CAPS Take 5 mg by mouth daily       digoxin (LANOXIN) 125 MCG tablet Take 125 mcg by mouth daily       ferrous sulfate (FEROSUL) 325 (65 Fe) MG tablet Take 1 tablet (325 mg) by mouth 2 times daily       fluticasone (FLOVENT HFA) 220 MCG/ACT Inhaler Inhale 2 puffs into the lungs 2 times daily        furosemide (LASIX) 40 MG tablet Take 1 tablet (40 mg) by mouth 2 times daily       GLUCOSAMINE-CHONDROITIN PO Take 2 capsules by mouth 2 times daily       guaiFENesin (MUCINEX) 600 MG 12 hr tablet Take 1 tablet (600 mg) by mouth 2 times daily       hydrOXYzine (ATARAX) 25 MG  tablet Take 2 tablets (50 mg) by mouth every 4 hours as needed for itching 30 tablet 0     ibrutinib (IMBRUVICA) 140 MG capsule Take 560 mg by mouth daily        insulin aspart (NOVOLOG PEN) 100 UNIT/ML pen Inject Subcutaneous At Bedtime For Bedtime: 200 - 239 give 1 units; 240 - 289 give 2 units; 290 - 339 give 3 units; = or >340 give 4 units and contact provider.       insulin aspart (NOVOLOG PEN) 100 UNIT/ML pen Inject Subcutaneous 3 times daily (with meals) TID AC: 140 - 189 = 2 unit; 190 - 239 = 3 unit; 240 - 289 = 4 unit; 290 - 339 = 5 unit; 340+ = 6 unit = or >340 give 6 units contact provider       insulin glargine (LANTUS PEN) 100 UNIT/ML pen Inject 15 Units Subcutaneous At Bedtime       ipratropium - albuterol 0.5 mg/2.5 mg/3 mL (DUONEB) 0.5-2.5 (3) MG/3ML neb solution Take 1 vial (3 mLs) by nebulization every 4 hours as needed for wheezing (Patient not taking: Reported on 10/28/2020)       METHYL SALICYLATE EX Externally apply topically 2 times daily        metoprolol (LOPRESSOR) 50 MG tablet Take 25 mg by mouth 2 times daily       Multiple Vitamins-Minerals (MULTIVITAMIN ADULT PO) Take 1 tablet by mouth daily        omeprazole (PRILOSEC) 20 MG DR capsule Take 1 capsule (20 mg) by mouth daily       oxybutynin (DITROPAN) 5 MG tablet Take 5 mg by mouth 2 times daily       polyethylene glycol (MIRALAX) 17 g packet Take 17 g by mouth 2 times daily as needed for constipation Hold for loose stools.       potassium chloride ER (K-TAB) 20 MEQ CR tablet Take 20 mEq by mouth 2 times daily       senna-docusate (SENOKOT-S/PERICOLACE) 8.6-50 MG tablet Take 1 tablet by mouth 2 times daily as needed for constipation       simvastatin (ZOCOR) 10 MG tablet Take 10 mg by mouth At Bedtime       SOY ISOFLAVONE PO Take 198 mg by mouth daily       TiZANidine HCl (ZANAFLEX) 2 MG CAPS Take 2 mg by mouth 4 times daily as needed        vitamin C (ASCORBIC ACID) 500 MG tablet Take 1 tablet (500 mg) by mouth daily           REVIEW  "OF SYSTEMS:  4 point ROS including Respiratory, CV, GI and , other than that noted in the HPI,  is negative    Objective:  BP (!) 144/80   Pulse 64   Temp 98.6  F (37  C)   Resp 16   Ht 1.778 m (5' 10\")   Wt 127.9 kg (282 lb)   SpO2 96%   BMI 40.46 kg/m    Exam:  Resp: Effort WNL, LSCTA   CV: VSS, chronic edema  Abd- soft, nontender, BS +  Musc- DIXON  Psych- alert, calm, pleasant      Labs:   Recent labs in [facility] reviewed by me today.     ASSESSMENT/PLAN:  Urinary tract infection with hematuria, site unspecified  Gross hematuria  - recurrent UTI, recurrent hematuria in TCU. - No active urinary s/s. HGB stable. Afebrile, WBC WNL  - complete Keflex  - monitor for resolution of hematuria  - f/w PCP and urology when return to [facility] after discharge.         Electronically signed by:  GASTON Baxter CNP                   Sincerely,        GASTON Baxter CNP    "

## 2020-12-04 NOTE — RESULT ENCOUNTER NOTE
Final Urine Culture Report on 12/3/20  Emergency Dept/Urgent Care discharge antibiotic prescribed: Cephalexin (Keflex) 500 mg capsule, 1 capsule (500 mg) by mouth 2 times daily for 7 days.  #1. Bacteria, 10,000 to 50,000 colonies/ml Escherichia coli, is SUSCEPTIBLE to Antibiotic.    As per [place] ED Lab Result protocol, no change in antibiotic therapy.

## 2020-12-07 ENCOUNTER — DISCHARGE SUMMARY NURSING HOME (OUTPATIENT)
Dept: GERIATRICS | Facility: CLINIC | Age: 75
End: 2020-12-07
Payer: MEDICARE

## 2020-12-07 ENCOUNTER — HOSPITAL LABORATORY (OUTPATIENT)
Dept: OTHER | Facility: CLINIC | Age: 75
End: 2020-12-07

## 2020-12-07 VITALS
OXYGEN SATURATION: 96 % | TEMPERATURE: 97.5 F | HEIGHT: 70 IN | HEART RATE: 75 BPM | SYSTOLIC BLOOD PRESSURE: 143 MMHG | BODY MASS INDEX: 40.6 KG/M2 | DIASTOLIC BLOOD PRESSURE: 84 MMHG | RESPIRATION RATE: 16 BRPM | WEIGHT: 283.6 LBS

## 2020-12-07 DIAGNOSIS — R31.9 URINARY TRACT INFECTION WITH HEMATURIA, SITE UNSPECIFIED: Primary | ICD-10-CM

## 2020-12-07 DIAGNOSIS — K59.01 SLOW TRANSIT CONSTIPATION: ICD-10-CM

## 2020-12-07 DIAGNOSIS — N32.81 OVERACTIVE BLADDER: ICD-10-CM

## 2020-12-07 DIAGNOSIS — E11.22 TYPE 2 DIABETES MELLITUS WITH STAGE 3 CHRONIC KIDNEY DISEASE, WITH LONG-TERM CURRENT USE OF INSULIN, UNSPECIFIED WHETHER STAGE 3A OR 3B CKD (H): ICD-10-CM

## 2020-12-07 DIAGNOSIS — Z79.4 TYPE 2 DIABETES MELLITUS WITH STAGE 3 CHRONIC KIDNEY DISEASE, WITH LONG-TERM CURRENT USE OF INSULIN, UNSPECIFIED WHETHER STAGE 3A OR 3B CKD (H): ICD-10-CM

## 2020-12-07 DIAGNOSIS — E78.5 HYPERLIPIDEMIA, UNSPECIFIED HYPERLIPIDEMIA TYPE: ICD-10-CM

## 2020-12-07 DIAGNOSIS — I48.20 CHRONIC ATRIAL FIBRILLATION (H): ICD-10-CM

## 2020-12-07 DIAGNOSIS — U07.1 COVID-19 VIRUS DETECTED: ICD-10-CM

## 2020-12-07 DIAGNOSIS — N18.30 TYPE 2 DIABETES MELLITUS WITH STAGE 3 CHRONIC KIDNEY DISEASE, WITH LONG-TERM CURRENT USE OF INSULIN, UNSPECIFIED WHETHER STAGE 3A OR 3B CKD (H): ICD-10-CM

## 2020-12-07 DIAGNOSIS — R31.0 GROSS HEMATURIA: ICD-10-CM

## 2020-12-07 DIAGNOSIS — N39.0 URINARY TRACT INFECTION WITH HEMATURIA, SITE UNSPECIFIED: Primary | ICD-10-CM

## 2020-12-07 DIAGNOSIS — N17.9 ACUTE RENAL FAILURE SUPERIMPOSED ON STAGE 3 CHRONIC KIDNEY DISEASE, UNSPECIFIED ACUTE RENAL FAILURE TYPE, UNSPECIFIED WHETHER STAGE 3A OR 3B CKD (H): ICD-10-CM

## 2020-12-07 DIAGNOSIS — E66.01 MORBID OBESITY (H): ICD-10-CM

## 2020-12-07 DIAGNOSIS — I10 ESSENTIAL HYPERTENSION: ICD-10-CM

## 2020-12-07 DIAGNOSIS — C83.10 MANTLE CELL LYMPHOMA, UNSPECIFIED BODY REGION (H): ICD-10-CM

## 2020-12-07 DIAGNOSIS — D64.9 ACUTE ON CHRONIC ANEMIA: ICD-10-CM

## 2020-12-07 DIAGNOSIS — R53.81 PHYSICAL DECONDITIONING: ICD-10-CM

## 2020-12-07 DIAGNOSIS — I89.0 LYMPHEDEMA: ICD-10-CM

## 2020-12-07 DIAGNOSIS — N18.30 ACUTE RENAL FAILURE SUPERIMPOSED ON STAGE 3 CHRONIC KIDNEY DISEASE, UNSPECIFIED ACUTE RENAL FAILURE TYPE, UNSPECIFIED WHETHER STAGE 3A OR 3B CKD (H): ICD-10-CM

## 2020-12-07 DIAGNOSIS — J44.9 CHRONIC OBSTRUCTIVE PULMONARY DISEASE, UNSPECIFIED COPD TYPE (H): ICD-10-CM

## 2020-12-07 LAB
ANION GAP SERPL CALCULATED.3IONS-SCNC: 4 MMOL/L (ref 3–14)
BUN SERPL-MCNC: 21 MG/DL (ref 7–30)
CALCIUM SERPL-MCNC: 8.2 MG/DL (ref 8.5–10.1)
CHLORIDE SERPL-SCNC: 104 MMOL/L (ref 94–109)
CO2 SERPL-SCNC: 30 MMOL/L (ref 20–32)
CREAT SERPL-MCNC: 1.08 MG/DL (ref 0.52–1.04)
ERYTHROCYTE [DISTWIDTH] IN BLOOD BY AUTOMATED COUNT: 17.5 % (ref 10–15)
GFR SERPL CREATININE-BSD FRML MDRD: 50 ML/MIN/{1.73_M2}
GLUCOSE SERPL-MCNC: 131 MG/DL (ref 70–99)
HCT VFR BLD AUTO: 30.2 % (ref 35–47)
HGB BLD-MCNC: 9.1 G/DL (ref 11.7–15.7)
MCH RBC QN AUTO: 29.3 PG (ref 26.5–33)
MCHC RBC AUTO-ENTMCNC: 30.1 G/DL (ref 31.5–36.5)
MCV RBC AUTO: 97 FL (ref 78–100)
PLATELET # BLD AUTO: 229 10E9/L (ref 150–450)
POTASSIUM SERPL-SCNC: 3.5 MMOL/L (ref 3.4–5.3)
RBC # BLD AUTO: 3.11 10E12/L (ref 3.8–5.2)
SODIUM SERPL-SCNC: 138 MMOL/L (ref 133–144)
WBC # BLD AUTO: 6.7 10E9/L (ref 4–11)

## 2020-12-07 PROCEDURE — 99316 NF DSCHRG MGMT 30 MIN+: CPT | Performed by: NURSE PRACTITIONER

## 2020-12-07 ASSESSMENT — MIFFLIN-ST. JEOR: SCORE: 1866.65

## 2020-12-07 NOTE — LETTER
12/7/2020        RE: Waleska Marie  [address]  [place] [address]        [place] [facility] DISCHARGE SUMMARY  PATIENT'S NAME: Waleska Marie  YOB: 1945  MEDICAL RECORD NUMBER:  [id number]  Place of Service where encounter took place:  [facility]  (S) [[id number]]    PRIMARY CARE PROVIDER AND CLINIC RESPONSIBLE AFTER TRANSFER:   [facility] [address] [facility] / [place] [address]    Non-G Provider     Transferring providers: Elsy Lee, GASTON COOL, Alessia Wiggins MD  Recent Hospitalization/ED:  Hospital  [facility] Hospital stay 10/08/2020 to 10/21/2020.  Date of SNF Admission: October / 21 / 2020  Date of SNF (anticipated) Discharge: December / 09 / 2020  Discharged to: with family son    Physical Function: Set up grooming and hygeine, min assist upper body dressing, dependent lower body dressing and toileting. Transfers assist of 2. Ambulating 25 feet with rolling walker standby assist      CODE STATUS/ADVANCE DIRECTIVES DISCUSSION:  Full Code     ALLERGIES: Azithromycin, Ciprofloxacin, Morphine, and Nitrofurantoin    DISCHARGE DIAGNOSIS/NURSING FACILITY COURSE:     Waleska Marie is a 75 year old  (1945) female with PMH  HTN, DM II, mantle cell lymphoma, COPD and atrial fibrillation. She initially presented to the [facility] ER on 10/8/20 with increasing LE edema and was found to have acute renal failure, hyperkalemia and metabolic acidosis. She was transferred to [facility] and was hospitalized from 10/8/20 to 10/21/20. She received emergent hemodialysis on 10/9/20 with improvement in her electrolytes. EVER was felt multifactorial from perhaps decreased PO intake as well as ACI-I and diuretic. PTA lisinopril, glargine, metformin, pioglitazone and KCl were discontinued.   Furosemide was held during hospitalization and then resumed at a split BID dose.  Labs also notable for acute anemia with Hgb 5.8 and Fe  sat 7% for which she received 2 units PRBCs and was started on Fe supplement and PPI. She was admitted to this facility for medical management and rehab.    In TCU patient has had intermittent hematuria and has been treated for recurrent UTIs. Was seen in ED for hematuria on 12/2 and etiology thought persistent UTI so patient was discharged on 3rd antibiotic course. Patient also diagnosed with COVID 19 while in TCU with positive PCR on 11/3. Associated s/s were mild and patient did not require supplemental O2.   Patient made slow functional gains in rehab and is discharging to home with family.    Issues addressed in TCU:        Urinary tract infection with hematuria, site unspecified  Gross hematuria  11/3 > 100k ecoli- treated with 5 day course of Bactrim- done 11/11 11/18  50 - 100K enterococcus faecalis- treated with 7 day course of Amoxicillin done 11/27 12/2  10,000 to 50,000 colonies/mL   Escherichia coli  Completing 7 day course of Keflex. Hematuria lessening and intermittent but present.     Patient instructed to f/w urology. Will make this appointment when returns to [facility] at discharge.     Acute on chronic anemia  ISAMAR- baseline hgb 8-9,  Hemoglobin   Date Value Ref Range Status   12/07/2020 9.1 (L) 11.7 - 15.7 g/dL Final   ]  Has remained stable in TCU in 9s.  Continues on supplemental Iron and     Acute renal failure superimposed on stage 3 chronic kidney disease, unspecified acute renal failure type, unspecified whether stage 3a or 3b CKD (H)  - thought 2/2 underlying diabetic nephropathy- required hemodialysis inpatient. Cr now baseline 1s and has been stable in TCU  - avoid nephrotoxins  - periodic BMP    Lymphedema  Chronic Venous Statis changes  - chronic and stable  - Continue ace wraps to LE on during the day and off at night    Type 2 diabetes mellitus with stage 3 chronic kidney disease, with long-term current use of insulin, unspecified whether stage 3a or 3b CKD (H)  -   Lab Results    Component Value Date    A1C 6.5 11/09/2020    A1C Canceled, Test credited 10/08/2020     - BGL trended up in TCU so Lantus was resumed. Metformin is contraindicated at this point d/t lactic acidosis  - Is currently on Lantus 15 units- -200s, will increase to 18 units at this time.   - BGL ac and HS  - f/w PCP after discharge, further eval and titration of regimen    Essential hypertension  - chronic, controlled- ACE stopped inpatient d/t renal failure and not resumed  - continues on lasix and metoprolol    Chronic atrial fibrillation (H)  - chronic, rate controlled  - continues on Apixaban- was on asa as well but this was discontinued 2/2 no indication and bleeding    Chronic obstructive pulmonary disease, unspecified COPD type (H)  - chronic, some chronic mild NAM- otherwise resp status stable  - continue on fluticasone and prn albuterol    Hyperlipidemia, unspecified hyperlipidemia type  - chronic, by history  - continue on simvastatin    Mantle cell lymphoma, unspecified body region (H)  - by history-dx 2009.  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   - continues on ibrutinib  - f/w oncology as directed    Overactive bladder  -chronic,  -  incontinent product, oxybutynin    Slow transit constipation  - resolved. Moving bowels regularly    Morbid obesity (H)  Body mass index is 40.69 kg/m .  Aggravating factor for rehab/recovery  - not time to actively diet for weight loss  - ongoing education and encouragement of healthy dietary choices and portion sizes    Physical deconditioning  - 2/2 above  - discharge functional status as above  - home with home care as outlined    COVID-19 virus detected  - PCR + 11/3. Mild resp s/s unclear if d/t COVID or UTI. Have since resolved. Afebrile. Transmission based precautions discontinued.      Past  Medical History:  has no past medical history on file.    Discharge Medications:      Current Outpatient Medications   Medication Sig Dispense Refill     acetaminophen (TYLENOL) 325 MG tablet Take 3 tablets (975 mg) by mouth 3 times daily       albuterol (PROAIR HFA/PROVENTIL HFA/VENTOLIN HFA) 108 (90 BASE) MCG/ACT Inhaler Inhale 2 puffs into the lungs every 4 hours as needed for shortness of breath / dyspnea or wheezing       apixaban ANTICOAGULANT (ELIQUIS) 5 MG tablet Take 5 mg by mouth 2 times daily       bisacodyl (DULCOLAX) 5 MG EC tablet Take 5 mg by mouth daily        cephALEXin (KEFLEX) 500 MG capsule Take 1 capsule (500 mg) by mouth 2 times daily for 7 days 14 capsule 0     cetirizine HCl (ZYRTEC ALLERGY) 10 MG CAPS Take 5 mg by mouth daily       digoxin (LANOXIN) 125 MCG tablet Take 125 mcg by mouth daily       ferrous sulfate (FEROSUL) 325 (65 Fe) MG tablet Take 1 tablet (325 mg) by mouth 2 times daily       fluticasone (FLOVENT HFA) 220 MCG/ACT Inhaler Inhale 2 puffs into the lungs 2 times daily        furosemide (LASIX) 40 MG tablet Take 1 tablet (40 mg) by mouth 2 times daily       GLUCOSAMINE-CHONDROITIN PO Take 2 capsules by mouth 2 times daily       guaiFENesin (MUCINEX) 600 MG 12 hr tablet Take 1 tablet (600 mg) by mouth 2 times daily       hydrOXYzine (ATARAX) 25 MG tablet Take 2 tablets (50 mg) by mouth every 4 hours as needed for itching 30 tablet 0     ibrutinib (IMBRUVICA) 140 MG capsule Take 560 mg by mouth daily        insulin aspart (NOVOLOG PEN) 100 UNIT/ML pen Inject Subcutaneous At Bedtime For Bedtime: 200 - 239 give 1 units; 240 - 289 give 2 units; 290 - 339 give 3 units; = or >340 give 4 units and contact provider.       insulin aspart (NOVOLOG PEN) 100 UNIT/ML pen Inject Subcutaneous 3 times daily (with meals) TID AC: 140 - 189 = 2 unit; 190 - 239 = 3 unit; 240 - 289 = 4 unit; 290 - 339 = 5 unit; 340+ = 6 unit = or >340 give 6 units contact provider       insulin glargine (LANTUS  "PEN) 100 UNIT/ML pen Inject 15 Units Subcutaneous At Bedtime       ipratropium - albuterol 0.5 mg/2.5 mg/3 mL (DUONEB) 0.5-2.5 (3) MG/3ML neb solution Take 1 vial (3 mLs) by nebulization every 4 hours as needed for wheezing (Patient not taking: Reported on 10/28/2020)       METHYL SALICYLATE EX Externally apply topically 2 times daily        metoprolol (LOPRESSOR) 50 MG tablet Take 25 mg by mouth 2 times daily       Multiple Vitamins-Minerals (MULTIVITAMIN ADULT PO) Take 1 tablet by mouth daily        omeprazole (PRILOSEC) 20 MG DR capsule Take 1 capsule (20 mg) by mouth daily       oxybutynin (DITROPAN) 5 MG tablet Take 5 mg by mouth 2 times daily       polyethylene glycol (MIRALAX) 17 g packet Take 17 g by mouth 2 times daily as needed for constipation Hold for loose stools.       potassium chloride ER (K-TAB) 20 MEQ CR tablet Take 20 mEq by mouth 2 times daily       senna-docusate (SENOKOT-S/PERICOLACE) 8.6-50 MG tablet Take 1 tablet by mouth 2 times daily as needed for constipation       simvastatin (ZOCOR) 10 MG tablet Take 10 mg by mouth At Bedtime       SOY ISOFLAVONE PO Take 198 mg by mouth daily       TiZANidine HCl (ZANAFLEX) 2 MG CAPS Take 2 mg by mouth 4 times daily as needed        vitamin C (ASCORBIC ACID) 500 MG tablet Take 1 tablet (500 mg) by mouth daily         Medication Changes/Rationale:     As above    Controlled medications sent with patient:   not applicable/none     ROS:   4 point ROS including Respiratory, CV, GI and , other than that noted in the HPI,  is negative    Physical Exam:   Vitals: BP (!) 143/84   Pulse 75   Temp 97.5  F (36.4  C)   Resp 16   Ht 1.778 m (5' 10\")   Wt 128.6 kg (283 lb 9.6 oz)   SpO2 96%   BMI 40.69 kg/m    BMI= Body mass index is 40.69 kg/m .    Resp: Effort WNL  CV: VS as above, chronic edema  Abd- soft, nontender, BS +  Musc- DIXON- w/c  Psych- alert, calm, pleasant       SNF labs:   Most Recent 3 CBC's:  Recent Labs   Lab Test 12/07/20  0525 " 12/02/20  1426 11/19/20  0900   WBC 6.7 7.8 7.2   HGB 9.1* 9.5* 8.8*   MCV 97 99 96    224 343     Most Recent 3 BMP's:  Recent Labs   Lab Test 12/07/20  0525 12/02/20  1426 11/18/20  0800    137 137   POTASSIUM 3.5 3.8 3.9   CHLORIDE 104 101 104   CO2 30 30 26   BUN 21 26 26   CR 1.08* 0.97 0.98   ANIONGAP 4 6 7   LEE 8.2* 8.9 8.1*   * 152* 162*         DISCHARGE PLAN:    Follow up labs: No labs orders/due    Medical Follow Up:      Follow up with primary care provider in 1 weeks  Follow up with specialist as above    MTM referral needed and placed by this provider: No    Current [place] scheduled appointments:       Discharge Services: Home Care:  Occupational Therapy, Physical Therapy, Registered Nurse and Home Health Aide    Discharge Instructions Verbalized to Patient at Discharge:     See instructions      TOTAL DISCHARGE TIME:   Greater than 30 minutes  Electronically signed by:  GASTON Baxter CNP     Home care Face to Face documentation done in [facility] attached to Home care orders for [facility].                         Sincerely,        GASTON Baxter CNP

## 2020-12-07 NOTE — PROGRESS NOTES
[facility] DISCHARGE SUMMARY  PATIENT'S NAME: Waleska Marie  YOB: 1945  MEDICAL RECORD NUMBER:  [id number]  Place of Service where encounter took place:  [facility]  (S) [[id number]]    PRIMARY CARE PROVIDER AND CLINIC RESPONSIBLE AFTER TRANSFER:   [facility] / [facility] [id number]    Non-FMG Provider     Transferring providers: Elsy Lee, GASTON COOL, Alessia Wiggins MD  Recent Hospitalization/ED:  Hospital  [facility] Hospital stay 10/08/2020 to 10/21/2020.  Date of SNF Admission: October / 21 / 2020  Date of SNF (anticipated) Discharge: December / 09 / 2020  Discharged to: with family son    Physical Function: Set up grooming and hygeine, min assist upper body dressing, dependent lower body dressing and toileting. Transfers assist of 2. Ambulating 25 feet with rolling walker standby assist      CODE STATUS/ADVANCE DIRECTIVES DISCUSSION:  Full Code     ALLERGIES: Azithromycin, Ciprofloxacin, Morphine, and Nitrofurantoin    DISCHARGE DIAGNOSIS/NURSING FACILITY COURSE:     Waleska Marie is a 75 year old  (1945) female with PMH  HTN, DM II, mantle cell lymphoma, COPD and atrial fibrillation. She initially presented to the [facility] ER on 10/8/20 with increasing LE edema and was found to have acute renal failure, hyperkalemia and metabolic acidosis. She was transferred to [facility] and was hospitalized from 10/8/20 to 10/21/20. She received emergent hemodialysis on 10/9/20 with improvement in her electrolytes. EVER was felt multifactorial from perhaps decreased PO intake as well as ACI-I and diuretic. PTA lisinopril, glargine, metformin, pioglitazone and KCl were discontinued.   Furosemide was held during hospitalization and then resumed at a split BID dose.  Labs also notable for acute anemia with Hgb 5.8 and Fe sat 7% for which she received 2 units PRBCs and was started on Fe supplement and PPI.  She was admitted to this facility for medical management and rehab.    In TCU patient has had intermittent hematuria and has been treated for recurrent UTIs. Was seen in ED for hematuria on 12/2 and etiology thought persistent UTI so patient was discharged on 3rd antibiotic course. Patient also diagnosed with COVID 19 while in TCU with positive PCR on 11/3. Associated s/s were mild and patient did not require supplemental O2.   Patient made slow functional gains in rehab and is discharging to home with family.    Issues addressed in TCU:        Urinary tract infection with hematuria, site unspecified  Gross hematuria  11/3 > 100k ecoli- treated with 5 day course of Bactrim- done 11/11 11/18  50 - 100K enterococcus faecalis- treated with 7 day course of Amoxicillin done 11/27 12/2  10,000 to 50,000 colonies/mL   Escherichia coli  Completing 7 day course of Keflex. Hematuria lessening and intermittent but present.     Patient instructed to f/w urology. Will make this appointment when returns to [facility] at discharge.     Acute on chronic anemia  ISAMAR- baseline hgb 8-9,  Hemoglobin   Date Value Ref Range Status   12/07/2020 9.1 (L) 11.7 - 15.7 g/dL Final   ]  Has remained stable in TCU in 9s.  Continues on supplemental Iron and     Acute renal failure superimposed on stage 3 chronic kidney disease, unspecified acute renal failure type, unspecified whether stage 3a or 3b CKD (H)  - thought 2/2 underlying diabetic nephropathy- required hemodialysis inpatient. Cr now baseline 1s and has been stable in TCU  - avoid nephrotoxins  - periodic BMP    Lymphedema  Chronic Venous Statis changes  - chronic and stable  - Continue ace wraps to LE on during the day and off at night    Type 2 diabetes mellitus with stage 3 chronic kidney disease, with long-term current use of insulin, unspecified whether stage 3a or 3b CKD (H)  -   Lab Results   Component Value Date    A1C 6.5 11/09/2020    A1C Canceled, Test credited  10/08/2020     - BGL trended up in TCU so Lantus was resumed. Metformin is contraindicated at this point d/t lactic acidosis  - Is currently on Lantus 15 units- -200s, will increase to 18 units at this time.   - BGL ac and HS  - f/w PCP after discharge, further eval and titration of regimen    Essential hypertension  - chronic, controlled- ACE stopped inpatient d/t renal failure and not resumed  - continues on lasix and metoprolol    Chronic atrial fibrillation (H)  - chronic, rate controlled  - continues on Apixaban- was on asa as well but this was discontinued 2/2 no indication and bleeding    Chronic obstructive pulmonary disease, unspecified COPD type (H)  - chronic, some chronic mild NAM- otherwise resp status stable  - continue on fluticasone and prn albuterol    Hyperlipidemia, unspecified hyperlipidemia type  - chronic, by history  - continue on simvastatin    Mantle cell lymphoma, unspecified body region (H)  - by history-dx 2009.  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   - continues on ibrutinib  - f/w oncology as directed    Overactive bladder  -chronic,  -  incontinent product, oxybutynin    Slow transit constipation  - resolved. Moving bowels regularly    Morbid obesity (H)  Body mass index is 40.69 kg/m .  Aggravating factor for rehab/recovery  - not time to actively diet for weight loss  - ongoing education and encouragement of healthy dietary choices and portion sizes    Physical deconditioning  - 2/2 above  - discharge functional status as above  - home with home care as outlined    COVID-19 virus detected  - PCR + 11/3. Mild resp s/s unclear if d/t COVID or UTI. Have since resolved. Afebrile. Transmission based precautions discontinued.      Past Medical History:  has no past medical history on file.    Discharge  Medications:      Current Outpatient Medications   Medication Sig Dispense Refill     acetaminophen (TYLENOL) 325 MG tablet Take 3 tablets (975 mg) by mouth 3 times daily       albuterol (PROAIR HFA/PROVENTIL HFA/VENTOLIN HFA) 108 (90 BASE) MCG/ACT Inhaler Inhale 2 puffs into the lungs every 4 hours as needed for shortness of breath / dyspnea or wheezing       apixaban ANTICOAGULANT (ELIQUIS) 5 MG tablet Take 5 mg by mouth 2 times daily       bisacodyl (DULCOLAX) 5 MG EC tablet Take 5 mg by mouth daily        cephALEXin (KEFLEX) 500 MG capsule Take 1 capsule (500 mg) by mouth 2 times daily for 7 days 14 capsule 0     cetirizine HCl (ZYRTEC ALLERGY) 10 MG CAPS Take 5 mg by mouth daily       digoxin (LANOXIN) 125 MCG tablet Take 125 mcg by mouth daily       ferrous sulfate (FEROSUL) 325 (65 Fe) MG tablet Take 1 tablet (325 mg) by mouth 2 times daily       fluticasone (FLOVENT HFA) 220 MCG/ACT Inhaler Inhale 2 puffs into the lungs 2 times daily        furosemide (LASIX) 40 MG tablet Take 1 tablet (40 mg) by mouth 2 times daily       GLUCOSAMINE-CHONDROITIN PO Take 2 capsules by mouth 2 times daily       guaiFENesin (MUCINEX) 600 MG 12 hr tablet Take 1 tablet (600 mg) by mouth 2 times daily       hydrOXYzine (ATARAX) 25 MG tablet Take 2 tablets (50 mg) by mouth every 4 hours as needed for itching 30 tablet 0     ibrutinib (IMBRUVICA) 140 MG capsule Take 560 mg by mouth daily        insulin aspart (NOVOLOG PEN) 100 UNIT/ML pen Inject Subcutaneous At Bedtime For Bedtime: 200 - 239 give 1 units; 240 - 289 give 2 units; 290 - 339 give 3 units; = or >340 give 4 units and contact provider.       insulin aspart (NOVOLOG PEN) 100 UNIT/ML pen Inject Subcutaneous 3 times daily (with meals) TID AC: 140 - 189 = 2 unit; 190 - 239 = 3 unit; 240 - 289 = 4 unit; 290 - 339 = 5 unit; 340+ = 6 unit = or >340 give 6 units contact provider       insulin glargine (LANTUS PEN) 100 UNIT/ML pen Inject 15 Units Subcutaneous At Bedtime        "ipratropium - albuterol 0.5 mg/2.5 mg/3 mL (DUONEB) 0.5-2.5 (3) MG/3ML neb solution Take 1 vial (3 mLs) by nebulization every 4 hours as needed for wheezing (Patient not taking: Reported on 10/28/2020)       METHYL SALICYLATE EX Externally apply topically 2 times daily        metoprolol (LOPRESSOR) 50 MG tablet Take 25 mg by mouth 2 times daily       Multiple Vitamins-Minerals (MULTIVITAMIN ADULT PO) Take 1 tablet by mouth daily        omeprazole (PRILOSEC) 20 MG DR capsule Take 1 capsule (20 mg) by mouth daily       oxybutynin (DITROPAN) 5 MG tablet Take 5 mg by mouth 2 times daily       polyethylene glycol (MIRALAX) 17 g packet Take 17 g by mouth 2 times daily as needed for constipation Hold for loose stools.       potassium chloride ER (K-TAB) 20 MEQ CR tablet Take 20 mEq by mouth 2 times daily       senna-docusate (SENOKOT-S/PERICOLACE) 8.6-50 MG tablet Take 1 tablet by mouth 2 times daily as needed for constipation       simvastatin (ZOCOR) 10 MG tablet Take 10 mg by mouth At Bedtime       SOY ISOFLAVONE PO Take 198 mg by mouth daily       TiZANidine HCl (ZANAFLEX) 2 MG CAPS Take 2 mg by mouth 4 times daily as needed        vitamin C (ASCORBIC ACID) 500 MG tablet Take 1 tablet (500 mg) by mouth daily         Medication Changes/Rationale:     As above    Controlled medications sent with patient:   not applicable/none     ROS:   4 point ROS including Respiratory, CV, GI and , other than that noted in the HPI,  is negative    Physical Exam:   Vitals: BP (!) 143/84   Pulse 75   Temp 97.5  F (36.4  C)   Resp 16   Ht 1.778 m (5' 10\")   Wt 128.6 kg (283 lb 9.6 oz)   SpO2 96%   BMI 40.69 kg/m    BMI= Body mass index is 40.69 kg/m .    Resp: Effort WNL  CV: VS as above, chronic edema  Abd- soft, nontender, BS +  Musc- DIXON- w/c  Psych- alert, calm, pleasant       SNF labs:   Most Recent 3 CBC's:  Recent Labs   Lab Test 12/07/20  0525 12/02/20  1426 11/19/20  0900   WBC 6.7 7.8 7.2   HGB 9.1* 9.5* 8.8*   MCV 97 " 99 96    224 343     Most Recent 3 BMP's:  Recent Labs   Lab Test 12/07/20  0525 12/02/20  1426 11/18/20  0800    137 137   POTASSIUM 3.5 3.8 3.9   CHLORIDE 104 101 104   CO2 30 30 26   BUN 21 26 26   CR 1.08* 0.97 0.98   ANIONGAP 4 6 7   LEE 8.2* 8.9 8.1*   * 152* 162*         DISCHARGE PLAN:    Follow up labs: No labs orders/due    Medical Follow Up:      Follow up with primary care provider in 1 weeks  Follow up with specialist as above    MTM referral needed and placed by this provider: No    Current [facility] scheduled appointments:       Discharge Services: Home Care:  Occupational Therapy, Physical Therapy, Registered Nurse and Home Health Aide    Discharge Instructions Verbalized to Patient at Discharge:     See instructions      TOTAL DISCHARGE TIME:   Greater than 30 minutes  Electronically signed by:  GASTON Baxter CNP     Home care Face to Face documentation done in EPIC attached to Home care orders for [facility].

## 2020-12-08 RX ORDER — IPRATROPIUM BROMIDE AND ALBUTEROL SULFATE 2.5; .5 MG/3ML; MG/3ML
3 SOLUTION RESPIRATORY (INHALATION) EVERY 4 HOURS PRN
Refills: 0
Start: 2020-12-08

## 2020-12-08 NOTE — PATIENT INSTRUCTIONS
Waleska Marie  YOB: 1945                                 SSN: [id number]  [facility] MRN#:[id number]  Medical Center Admission Date:   Discharge Date: 12/8/2020   PHYSICIAN's DISCHARGE SUMMARY / ORDER SHEET  1. Discharge to: Home with family  2. Medications:      Current Outpatient Medications   Medication Sig     acetaminophen (TYLENOL) 325 MG tablet Take 3 tablets (975 mg) by mouth 3 times daily     apixaban ANTICOAGULANT (ELIQUIS) 5 MG tablet Take 5 mg by mouth 2 times daily     bisacodyl (DULCOLAX) 5 MG EC tablet Take 5 mg by mouth daily      cetirizine HCl (ZYRTEC ALLERGY) 10 MG CAPS Take 5 mg by mouth daily     digoxin (LANOXIN) 125 MCG tablet Take 125 mcg by mouth daily     ferrous sulfate (FEROSUL) 325 (65 Fe) MG tablet Take 1 tablet (325 mg) by mouth 2 times daily     fluticasone (FLOVENT HFA) 220 MCG/ACT Inhaler Inhale 2 puffs into the lungs 2 times daily      furosemide (LASIX) 40 MG tablet Take 1 tablet (40 mg) by mouth 2 times daily     GLUCOSAMINE-CHONDROITIN PO Take 2 capsules by mouth 2 times daily     guaiFENesin (MUCINEX) 600 MG 12 hr tablet Take 1 tablet (600 mg) by mouth 2 times daily     hydrOXYzine (ATARAX) 25 MG tablet Take 2 tablets (50 mg) by mouth every 4 hours as needed for itching     ibrutinib (IMBRUVICA) 140 MG capsule Take 560 mg by mouth daily      insulin glargine (LANTUS PEN) 100 UNIT/ML pen Inject 18 Units Subcutaneous At Bedtime     ipratropium - albuterol 0.5 mg/2.5 mg/3 mL (DUONEB) 0.5-2.5 (3) MG/3ML neb solution Take 1 vial (3 mLs) by nebulization every 4 hours as needed for wheezing     METHYL SALICYLATE EX Externally apply topically 2 times daily      metoprolol (LOPRESSOR) 50 MG tablet Take 25 mg by mouth 2 times daily     Multiple Vitamins-Minerals (MULTIVITAMIN ADULT PO) Take 1 tablet by mouth daily      omeprazole (PRILOSEC) 20 MG DR capsule Take 1 capsule (20 mg) by mouth daily     oxybutynin (DITROPAN) 5 MG tablet Take 5 mg by mouth 2 times daily      polyethylene glycol (MIRALAX) 17 g packet Take 17 g by mouth 2 times daily as needed for constipation Hold for loose stools.     potassium chloride ER (K-TAB) 20 MEQ CR tablet Take 20 mEq by mouth 2 times daily     senna-docusate (SENOKOT-S/PERICOLACE) 8.6-50 MG tablet Take 1 tablet by mouth 2 times daily as needed for constipation     simvastatin (ZOCOR) 10 MG tablet Take 10 mg by mouth At Bedtime     SOY ISOFLAVONE PO Take 198 mg by mouth daily     TiZANidine HCl (ZANAFLEX) 2 MG CAPS Take 2 mg by mouth 4 times daily as needed      vitamin C (ASCORBIC ACID) 500 MG tablet Take 1 tablet (500 mg) by mouth daily     albuterol (PROAIR HFA/PROVENTIL HFA/VENTOLIN HFA) 108 (90 BASE) MCG/ACT Inhaler Inhale 2 puffs into the lungs every 4 hours as needed for shortness of breath / dyspnea or wheezing       --see Home Medication List/Physician Order    3. Diet: regular    4. Condition on Discharge: Stable    5. Level of Activity: Up with assistance as tolerated     6. Appointment:  Follow up with primary MD within 7 days from discharge.                                Schedule an appointment with urology in [facility] after discharge. Discuss with                            your MD if require a referral there.                           Follow up with oncology as scheduled and directed.      7. Referrals: Home Care RN, PT, OT, HHA and [facility] Inter[facility] Home care     8. Other orders/comments:    Weight yourself daily. Update your clinic provider if note worsening leg swelling or weight increase.    Ace wraps on during the day and off at night.      FACILITY DISCHARGE ORDERS    Discharge patient to home with current medications and treatments  Discharge Home with Home care as above  - Nursing call in 30 days supply of needed meds to pharmacy of choice upon discharge  - please send home with original of these discharge instructions and copy for chart.     ______________________________  GASTON Baxter CNP   Department of  [facility]                   12/8/2020

## 2020-12-08 NOTE — PROGRESS NOTES
Patient: Waleska Marie    : 1945      ORDERS:    Discontinue current Lantus order    Lantus insulin 18 units subcutaneous at bedtime    Discontinue sliding scale insulin    Given final dose of Keflex prior to discharge tomorrow a.GASTON Onofre CNP on 2020 at 12:31 PM

## 2022-03-30 NOTE — PLAN OF CARE
OT: pt with other therapy for extended time, will have OT needs during hospital stay. Will reschedule for tomorrow as appropriate   Psychotherapy Provided: Individual Psychotherapy 50 minutes     Length of time in session: 50 minutes, follow up in 1 week    Encounter Diagnosis     ICD-10-CM    1  Mood disorder ([address] )  F39    2  Generalized anxiety disorder  F41 1        Goals addressed in session: Goal 1     Data:  Jeannie Adams reports tonight that she is feeling very depressed but she is also experiencing racing thoughts  She continues to not be able to sleep and adds again that her emotions are "all over the place "  She tearfully remarks that  has been very supportive, and adds that "he puts up with a lot "  Jeannie Adams shares at length the frustrations of her job this week and also comments that she and her  are giving consideration to seeing both of their families (whom they have avoided) over the Easter holiday  Therapist provides validation and challenges several distortions that [employer]  Again we discuss the benefits of seeing a psychiatric nurse practitioner or psychiatrist for medication consultation  Jeannie Adams agrees that she is not doing better - and possibly even worse, and acknowledges the possibly benefits of the correct medication  Assessment:  Brandie's reported mood is depressed  She presents as labile - she is tearful  Her affect is congruent  Jeannie Adams verbalizes several distortions - including her belief that her manager thinks she is "a complainer," even though she literally never complains about her job  Jeannie Adams denies SI/HI  Plan:  Jeannie Adams will follow up in 1 week  At that time, we will return to a discussion of family dynamics  Pain:      moderate to severe    0    Current suicide risk : Low         Behavioral Health Treatment Plan [facility]: Diagnosis and Treatment Plan explained to Luis Antonio Lal relates understanding diagnosis and is agreeable to Treatment Plan   YES

## 2023-06-15 NOTE — PROGRESS NOTES
Care Management Follow Up Note    Length of Stay (days) 11    Patient plan of care discussed at Interdisciplinary Rounds: yes  Expected Discharge Date: 10/20/20  Concerns to be Addressed: All concerns addressed at this time.    Anticipated Discharge Disposition:  TCU  Anticipated Discharge Services:  Therapies  Anticipated Discharge DME:  Unknown at this time    Plan:  The pt will discharge to TCU when a bed is available.  Pt has improved slightly since admission and is now an assist of 2 instead of an assist of 3.  Sw sent an additional referral to [facility].    Sw will continue with discharge planning and will be available as needed until discharge.    NAE Perez, [facility]  Inpatient Care Coordination  [facility]  [phone]   Griseofulvin Counseling:  I discussed with the patient the risks of griseofulvin including but not limited to photosensitivity, cytopenia, liver damage, nausea/vomiting and severe allergy.  The patient understands that this medication is best absorbed when taken with a fatty meal (e.g., ice cream or french fries).